# Patient Record
Sex: FEMALE | Race: WHITE | NOT HISPANIC OR LATINO | Employment: UNEMPLOYED | URBAN - METROPOLITAN AREA
[De-identification: names, ages, dates, MRNs, and addresses within clinical notes are randomized per-mention and may not be internally consistent; named-entity substitution may affect disease eponyms.]

---

## 2023-09-28 ENCOUNTER — OFFICE VISIT (OUTPATIENT)
Dept: GASTROENTEROLOGY | Facility: CLINIC | Age: 43
End: 2023-09-28
Payer: MEDICARE

## 2023-09-28 VITALS
BODY MASS INDEX: 33.9 KG/M2 | HEIGHT: 64 IN | HEART RATE: 82 BPM | WEIGHT: 198.6 LBS | DIASTOLIC BLOOD PRESSURE: 71 MMHG | SYSTOLIC BLOOD PRESSURE: 156 MMHG

## 2023-09-28 DIAGNOSIS — R10.10 PAIN OF UPPER ABDOMEN: ICD-10-CM

## 2023-09-28 DIAGNOSIS — K59.00 CONSTIPATION, UNSPECIFIED CONSTIPATION TYPE: Primary | ICD-10-CM

## 2023-09-28 DIAGNOSIS — R11.0 NAUSEA: ICD-10-CM

## 2023-09-28 DIAGNOSIS — K21.9 GASTROESOPHAGEAL REFLUX DISEASE WITHOUT ESOPHAGITIS: ICD-10-CM

## 2023-09-28 PROCEDURE — 1159F MED LIST DOCD IN RCRD: CPT | Performed by: NURSE PRACTITIONER

## 2023-09-28 PROCEDURE — 1160F RVW MEDS BY RX/DR IN RCRD: CPT | Performed by: NURSE PRACTITIONER

## 2023-09-28 PROCEDURE — 99214 OFFICE O/P EST MOD 30 MIN: CPT | Performed by: NURSE PRACTITIONER

## 2023-09-28 RX ORDER — FAMOTIDINE 20 MG/1
20 TABLET, FILM COATED ORAL NIGHTLY
Qty: 30 TABLET | Refills: 1 | Status: SHIPPED | OUTPATIENT
Start: 2023-09-28

## 2023-09-28 RX ORDER — PROMETHAZINE HYDROCHLORIDE 25 MG/1
25 TABLET ORAL EVERY 6 HOURS PRN
Qty: 30 TABLET | Refills: 3 | Status: SHIPPED | OUTPATIENT
Start: 2023-09-28

## 2023-09-28 NOTE — PATIENT INSTRUCTIONS
Continue Linzess 72 daily     Continue protonix 40 mg daily     Add pepcid 20 mg before dinner or bedtime     Give me update in 1-2 weeks

## 2023-09-28 NOTE — PROGRESS NOTES
Chief Complaint   Heartburn and Constipation    History of Present Illness       Liyah Butts is a 43 y.o. female who presents to [facility] for follow-up for constipation. She was last seen in the office by me on 6/28/23.      She underwent EGD with Dr. Damon on 6/16/2023.EGD showed gastritis.  Otherwise normal.     She admits she is not on Protonix.  She does not remember taking it.  She is still having a lot of breakthrough reflux.  Worse with food.     She is having issues with constipation. She is trying colace but admits its not helping. Miralax doesn't work. She admits this was never an issue until she started taking all these new medicines. She denies any rectal bleeding or melena. Good appetite.      Colonoscopy--never had one     GI family history---denies any significant     Doing better with Linzess 72 daily. Bowels moving better. Still having nausea off and on throughout the day. Using phenergan 1-2 times a day. Reflux better on protonix 40 mg daily. She does report the upper abd pain is better since starting these two meds.   Results       Result Review :       CMP          6/14/2023    01:40 6/14/2023    02:04 6/14/2023    04:35 6/14/2023    07:55 6/14/2023    11:37 6/14/2023    22:52 6/15/2023    05:31 6/16/2023    04:49   CMP   Glucose 352  350  382  232  186   114  134    BUN  40  40  36  31   32  29    Creatinine  1.83  2.01  1.59  1.40   1.19  1.04    EGFR  35.0  31.3  41.4  48.3   58.7  69.0    Sodium 142.2  140  135  134  133   134  135    Potassium 4.2  4.2  3.5  3.7  3.5  4.0  3.9  3.8    Chloride 102  98  98  100  99   103  102    Calcium  10.3  9.0  8.7  8.7   9.0  9.1    Total Protein  8.9          Albumin  4.4          Globulin  4.5          Total Bilirubin  0.6          Alkaline Phosphatase  59          AST (SGOT)  20          ALT (SGPT)  18          Albumin/Globulin Ratio  1.0          BUN/Creatinine Ratio  21.9  19.9  22.6  22.1   26.9  27.9     Anion Gap  25.1  18.8  13.0  13.3   9.5  13.2      CBC          6/14/2023    02:04 6/14/2023    04:35 6/15/2023    05:31 6/16/2023    04:49   CBC   WBC 17.40  15.15  11.73  9.80    RBC 5.57  4.77  4.73  4.54    Hemoglobin 15.4  13.1  13.1  12.7    Hematocrit 44.1  38.8  39.2  37.3    MCV 79.2  81.3  82.9  82.2    MCH 27.6  27.5  27.7  28.0    MCHC 34.9  33.8  33.4  34.0    RDW 14.1  14.1  14.4  13.7    Platelets 661  561  487  429      Lipid Panel          5/23/2023    12:47   Lipid Panel   Total Cholesterol 389    Triglycerides 669    HDL Cholesterol 41    VLDL Cholesterol 148    LDL Cholesterol  200    LDL/HDL Ratio 5.22      TSH          5/23/2023    12:47   TSH   TSH 1.760                    Past Medical History       Past Medical History:   Diagnosis Date    Anxiety 2021    Anxiety    Brain tumor (benign)     pituitary gland    Brain tumor (benign)     pituatary gland    Cancer     ovarian; history of    Depression 2021    Diabetes mellitus     Headache 2003    Hyperlipidemia 2018    Hypertension 2018    Obesity 1980s    Visual impairment        Past Surgical History:   Procedure Laterality Date    ENDOSCOPY N/A 6/16/2023    Procedure: ESOPHAGOGASTRODUODENOSCOPY w/ biopsy;  Surgeon: Alyssa Damon MD;  Location: [facility] ENDOSCOPY;  Service: Gastroenterology;  Laterality: N/A;  gastritis    EYE SURGERY  Feb 2022    HYSTERECTOMY           Current Outpatient Medications:     atorvastatin (LIPITOR) 40 MG tablet, TAKE 1 TABLET BY MOUTH EVERY DAY AT NIGHT, Disp: 90 tablet, Rfl: 1    B Complex-C (B-complex with vitamin C) tablet, Take 1 tablet by mouth Daily., Disp: 30 tablet, Rfl: 11    Blood Glucose Monitoring Suppl (ONE TOUCH ULTRA MINI) w/Device kit, 1 each by Other route 3 (Three) Times a Day Before Meals., Disp: 1 each, Rfl: 0    buPROPion XL (WELLBUTRIN XL) 150 MG 24 hr tablet, Take 1 tablet by mouth Daily., Disp: 90 tablet, Rfl: 1    fenofibrate (TRICOR) 145 MG tablet, TAKE 1 TABLET BY MOUTH EVERY  DAY, Disp: 90 tablet, Rfl: 1    Glucose Blood (Blood Glucose Test Strips 333) strip, 90 each by In Vitro route 3 (Three) Times a Day. Pharmacist Choice Use as directed to check blood sugars TID  DX: E11.9, Disp: 90 strip, Rfl: 3    glucose blood test strip, 1 each by Other route 3 (Three) Times a Day Before Meals. Use as instructed, Disp: 90 each, Rfl: 12    glucose monitor monitoring kit, 1 each 3 (Three) Times a Day Before Meals. Pharmacist Choice  Use as directed to check blood sugars TID  DX: E11.9, Disp: 1 each, Rfl: 0    hydrOXYzine (ATARAX) 25 MG tablet, TAKE 1-2 TABS BY MOUTH TWICE DAILY, Disp: 120 tablet, Rfl: 2    insulin detemir (Levemir FlexPen) 100 UNIT/ML injection, Inject 20 Units under the skin into the appropriate area as directed Daily., Disp: 3 mL, Rfl: 3    Insulin Pen Needle (BD Pen Needle Imelda U/F) 32G X 4 MM misc, Use 1 each Daily as directed., Disp: 100 each, Rfl: 0    Jardiance 10 MG tablet tablet, TAKE 1 TABLET BY MOUTH EVERY DAY, Disp: 30 tablet, Rfl: 0    Lancets misc, 90 each 3 (Three) Times a Day. UAD to check blood sugars TID  DX: E11.9, Disp: 90 each, Rfl: 3    linaclotide (Linzess) 72 MCG capsule capsule, Take 1 capsule by mouth Every Morning Before Breakfast., Disp: 90 capsule, Rfl: 3    losartan (COZAAR) 50 MG tablet, Take 1 tablet by mouth Daily., Disp: 90 tablet, Rfl: 1    OneTouch Delica Lancets 33G misc, 1 each by Other route 3 (Three) Times a Day Before Meals., Disp: 100 each, Rfl: 12    pantoprazole (PROTONIX) 40 MG EC tablet, TAKE 1 TABLET BY MOUTH EVERY DAY, Disp: 90 tablet, Rfl: 1    promethazine (PHENERGAN) 25 MG tablet, Take 1 tablet by mouth Every 6 (Six) Hours As Needed for Nausea or Vomiting., Disp: 30 tablet, Rfl: 3    vitamin D (ERGOCALCIFEROL) 1.25 MG (39906 UT) capsule capsule, Take 1 capsule by mouth 1 (One) Time Per Week. Take with a fatty meal, Disp: 13 capsule, Rfl: 1    famotidine (PEPCID) 20 MG tablet, Take 1 tablet by mouth Every Night., Disp: 30 tablet,  "Rfl: 1     No Known Allergies    Family History   Problem Relation Age of Onset    Arthritis Mother     Diabetes Mother     Drug abuse Mother     Heart disease Mother     Hyperlipidemia Mother         Social History     Social History Narrative    Lives with spouse    Eats at least 2 meals per day     Exercises daily- walking    Broken sleep        Objective       Review of Systems   Constitutional:  Positive for appetite change. Negative for fatigue, fever, unexpected weight gain and unexpected weight loss.   HENT:  Negative for trouble swallowing.    Respiratory:  Negative for cough, choking, chest tightness, shortness of breath, wheezing and stridor.    Cardiovascular:  Negative for chest pain, palpitations and leg swelling.   Gastrointestinal:  Positive for abdominal distention, constipation, nausea, GERD and indigestion. Negative for abdominal pain, anal bleeding, blood in stool, diarrhea, rectal pain and vomiting.      Vital Signs:   /71 (BP Location: Right arm, Patient Position: Sitting, Cuff Size: Adult)   Pulse 82   Ht 162 cm (63.78\")   Wt 90.1 kg (198 lb 9.6 oz)   BMI 34.33 kg/m²       Physical Exam  Constitutional:       General: She is not in acute distress.     Appearance: She is well-developed. She is not ill-appearing.   HENT:      Head: Normocephalic.   Eyes:      Pupils: Pupils are equal, round, and reactive to light.   Cardiovascular:      Rate and Rhythm: Normal rate and regular rhythm.      Heart sounds: Normal heart sounds.   Pulmonary:      Effort: Pulmonary effort is normal.      Breath sounds: Normal breath sounds.   Abdominal:      General: Bowel sounds are normal. There is distension.      Palpations: Abdomen is soft. There is no mass.      Tenderness: There is no abdominal tenderness. There is no guarding or rebound.      Hernia: No hernia is present.   Musculoskeletal:         General: Normal range of motion.   Skin:     General: Skin is warm and dry.   Neurological:      Mental " Status: She is alert and oriented to person, place, and time.   Psychiatric:         Speech: Speech normal.         Behavior: Behavior normal.         Judgment: Judgment normal.         Assessment & Plan          Assessment and Plan    Diagnoses and all orders for this visit:    1. Constipation, unspecified constipation type (Primary)  -     linaclotide (Linzess) 72 MCG capsule capsule; Take 1 capsule by mouth Every Morning Before Breakfast.  Dispense: 90 capsule; Refill: 3    2. Gastroesophageal reflux disease without esophagitis  -     famotidine (PEPCID) 20 MG tablet; Take 1 tablet by mouth Every Night.  Dispense: 30 tablet; Refill: 1    3. Nausea  -     promethazine (PHENERGAN) 25 MG tablet; Take 1 tablet by mouth Every 6 (Six) Hours As Needed for Nausea or Vomiting.  Dispense: 30 tablet; Refill: 3    4. Pain of upper abdomen        GERD definitely seems better on Protonix 40 mg daily.  Still having some breakthrough reflux however.  We will start Pepcid 20 mg at night.  I am hoping this will help with the nausea she has been experiencing lately.  No longer having the upper abdominal pain.  Bowels moving better on Linzess 72 daily.  Continue a high-fiber diet.  Patient to call the office in 1 to 2 weeks with an update.  Patient to follow-up with me in 3 months.  Patient is agreeable to the plan.        Follow Up       Follow Up   Return in about 3 months (around 12/28/2023) for CONSTIPATION, GERD.  Patient was given instructions and counseling regarding her condition or for health maintenance advice. Please see specific information pulled into the AVS if appropriate.

## 2023-10-10 RX ORDER — HYDROXYZINE HYDROCHLORIDE 25 MG/1
TABLET, FILM COATED ORAL
Qty: 180 TABLET | Refills: 2 | Status: SHIPPED | OUTPATIENT
Start: 2023-10-10

## 2023-10-12 RX ORDER — EMPAGLIFLOZIN 10 MG/1
TABLET, FILM COATED ORAL
Qty: 30 TABLET | Refills: 0 | Status: SHIPPED | OUTPATIENT
Start: 2023-10-12

## 2023-10-24 DIAGNOSIS — K21.9 GASTROESOPHAGEAL REFLUX DISEASE WITHOUT ESOPHAGITIS: ICD-10-CM

## 2023-10-24 RX ORDER — FAMOTIDINE 20 MG/1
20 TABLET, FILM COATED ORAL NIGHTLY
Qty: 90 TABLET | Refills: 0 | Status: SHIPPED | OUTPATIENT
Start: 2023-10-24

## 2023-10-24 NOTE — TELEPHONE ENCOUNTER
Patient is requesting a refill of famotidine 20mg, medication is pended     Last OV: 9/28/2023  Last refill: 9/28/2023  Next OV: 12/28/2023

## 2023-10-30 RX ORDER — FAMOTIDINE 20 MG/1
20 TABLET, FILM COATED ORAL NIGHTLY
Qty: 90 TABLET | Refills: 0 | OUTPATIENT
Start: 2023-10-30

## 2023-11-01 RX ORDER — ERGOCALCIFEROL 1.25 MG/1
50000 CAPSULE ORAL WEEKLY
Qty: 12 CAPSULE | Refills: 2 | Status: SHIPPED | OUTPATIENT
Start: 2023-11-01

## 2023-11-09 RX ORDER — EMPAGLIFLOZIN 10 MG/1
TABLET, FILM COATED ORAL
Qty: 30 TABLET | Refills: 0 | Status: SHIPPED | OUTPATIENT
Start: 2023-11-09

## 2023-11-11 DIAGNOSIS — K21.9 GASTROESOPHAGEAL REFLUX DISEASE WITHOUT ESOPHAGITIS: ICD-10-CM

## 2023-11-13 RX ORDER — PANTOPRAZOLE SODIUM 40 MG/1
40 TABLET, DELAYED RELEASE ORAL
Qty: 180 TABLET | Refills: 1 | Status: SHIPPED | OUTPATIENT
Start: 2023-11-13

## 2023-12-01 DIAGNOSIS — R11.0 NAUSEA: ICD-10-CM

## 2023-12-04 RX ORDER — PROMETHAZINE HYDROCHLORIDE 25 MG/1
25 TABLET ORAL EVERY 6 HOURS PRN
Qty: 30 TABLET | Refills: 3 | Status: SHIPPED | OUTPATIENT
Start: 2023-12-04

## 2023-12-04 NOTE — TELEPHONE ENCOUNTER
Pt is requesting promethazine. Order pended.   Last ov: 9/28/23  Next ov: 12/28/23  Last refill: 9/28/23

## 2023-12-07 RX ORDER — EMPAGLIFLOZIN 10 MG/1
TABLET, FILM COATED ORAL
Qty: 30 TABLET | Refills: 0 | Status: SHIPPED | OUTPATIENT
Start: 2023-12-07

## 2023-12-12 DIAGNOSIS — K21.9 GASTROESOPHAGEAL REFLUX DISEASE WITHOUT ESOPHAGITIS: ICD-10-CM

## 2023-12-12 RX ORDER — FAMOTIDINE 20 MG/1
20 TABLET, FILM COATED ORAL
Qty: 30 TABLET | Refills: 1 | Status: SHIPPED | OUTPATIENT
Start: 2023-12-12

## 2023-12-28 ENCOUNTER — OFFICE VISIT (OUTPATIENT)
Dept: GASTROENTEROLOGY | Facility: CLINIC | Age: 43
End: 2023-12-28
Payer: MEDICARE

## 2023-12-28 VITALS
BODY MASS INDEX: 34.38 KG/M2 | HEIGHT: 64 IN | HEART RATE: 74 BPM | WEIGHT: 201.4 LBS | SYSTOLIC BLOOD PRESSURE: 156 MMHG | DIASTOLIC BLOOD PRESSURE: 81 MMHG

## 2023-12-28 DIAGNOSIS — K59.00 CONSTIPATION, UNSPECIFIED CONSTIPATION TYPE: Primary | ICD-10-CM

## 2023-12-28 DIAGNOSIS — K21.9 GASTROESOPHAGEAL REFLUX DISEASE WITHOUT ESOPHAGITIS: ICD-10-CM

## 2023-12-28 DIAGNOSIS — R10.10 PAIN OF UPPER ABDOMEN: ICD-10-CM

## 2023-12-28 DIAGNOSIS — R11.0 NAUSEA: ICD-10-CM

## 2023-12-28 PROCEDURE — 1160F RVW MEDS BY RX/DR IN RCRD: CPT | Performed by: NURSE PRACTITIONER

## 2023-12-28 PROCEDURE — 99214 OFFICE O/P EST MOD 30 MIN: CPT | Performed by: NURSE PRACTITIONER

## 2023-12-28 PROCEDURE — 1159F MED LIST DOCD IN RCRD: CPT | Performed by: NURSE PRACTITIONER

## 2023-12-28 NOTE — PROGRESS NOTES
Chief Complaint   Constipation and Heartburn    History of Present Illness       Liyah Butts is a 43 y.o. female who presents to [facility] for follow-up for constipation.  She was last seen in the office by me on 9/28/2023.    She underwent EGD with Dr. Damon on 6/16/2023.EGD showed gastritis.  Otherwise normal.     She admits she is not on Protonix.  She does not remember taking it.  She is still having a lot of breakthrough reflux.  Worse with food.     She is having issues with constipation. She is trying colace but admits its not helping. Miralax doesn't work. She admits this was never an issue until she started taking all these new medicines. She denies any rectal bleeding or melena. Good appetite.      Colonoscopy--never had one     GI family history---denies any significant      She has been having good and bad days. Acid foods will mess her up. She does feels reflux better on the pepcid 20  mg at night. Linzess 72 just isnt working as well as it used too.      Results       Result Review :       CMP          6/14/2023    01:40 6/14/2023    02:04 6/14/2023    04:35 6/14/2023    07:55 6/14/2023    11:37 6/14/2023    22:52 6/15/2023    05:31 6/16/2023    04:49   CMP   Glucose 352  350  382  232  186   114  134    BUN  40  40  36  31   32  29    Creatinine  1.83  2.01  1.59  1.40   1.19  1.04    EGFR  35.0  31.3  41.4  48.3   58.7  69.0    Sodium 142.2  140  135  134  133   134  135    Potassium 4.2  4.2  3.5  3.7  3.5  4.0  3.9  3.8    Chloride 102  98  98  100  99   103  102    Calcium  10.3  9.0  8.7  8.7   9.0  9.1    Total Protein  8.9          Albumin  4.4          Globulin  4.5          Total Bilirubin  0.6          Alkaline Phosphatase  59          AST (SGOT)  20          ALT (SGPT)  18          Albumin/Globulin Ratio  1.0          BUN/Creatinine Ratio  21.9  19.9  22.6  22.1   26.9  27.9    Anion Gap  25.1  18.8  13.0  13.3   9.5  13.2      CBC           "6/14/2023    02:04 6/14/2023    04:35 6/15/2023    05:31 6/16/2023    04:49   CBC   WBC 17.40  15.15  11.73  9.80    RBC 5.57  4.77  4.73  4.54    Hemoglobin 15.4  13.1  13.1  12.7    Hematocrit 44.1  38.8  39.2  37.3    MCV 79.2  81.3  82.9  82.2    MCH 27.6  27.5  27.7  28.0    MCHC 34.9  33.8  33.4  34.0    RDW 14.1  14.1  14.4  13.7    Platelets 661  561  487  429      Lipid Panel          5/23/2023    12:47   Lipid Panel   Total Cholesterol 389    Triglycerides 669    HDL Cholesterol 41    VLDL Cholesterol 148    LDL Cholesterol  200    LDL/HDL Ratio 5.22      TSH          5/23/2023    12:47   TSH   TSH 1.760        Lipase   Lipase   Date Value Ref Range Status   06/13/2023 35 13 - 60 U/L Final     Amylase No results found for: \"AMYLASE\"            Past Medical History       Past Medical History:   Diagnosis Date    Anxiety 2021    Anxiety    Brain tumor (benign)     pituitary gland    Brain tumor (benign)     pituatary gland    Cancer     ovarian; history of    Depression 2021    Diabetes mellitus     Headache 2003    Hyperlipidemia 2018    Hypertension 2018    Obesity 1980s    Visual impairment        Past Surgical History:   Procedure Laterality Date    ENDOSCOPY N/A 6/16/2023    Procedure: ESOPHAGOGASTRODUODENOSCOPY w/ biopsy;  Surgeon: Alyssa Damon MD;  Location: [facility] ENDOSCOPY;  Service: Gastroenterology;  Laterality: N/A;  gastritis    EYE SURGERY  Feb 2022    HYSTERECTOMY           Current Outpatient Medications:     atorvastatin (LIPITOR) 40 MG tablet, TAKE 1 TABLET BY MOUTH EVERY DAY AT NIGHT, Disp: 90 tablet, Rfl: 1    B Complex-C (B-complex with vitamin C) tablet, Take 1 tablet by mouth Daily., Disp: 30 tablet, Rfl: 11    Blood Glucose Monitoring Suppl (ONE TOUCH ULTRA MINI) w/Device kit, 1 each by Other route 3 (Three) Times a Day Before Meals., Disp: 1 each, Rfl: 0    buPROPion XL (WELLBUTRIN XL) 150 MG 24 hr tablet, Take 1 tablet by mouth Daily., Disp: 90 tablet, Rfl: 1    famotidine " (PEPCID) 20 MG tablet, TAKE 1 TABLET BY MOUTH EVERY DAY AT NIGHT, Disp: 30 tablet, Rfl: 1    fenofibrate (TRICOR) 145 MG tablet, TAKE 1 TABLET BY MOUTH EVERY DAY, Disp: 90 tablet, Rfl: 1    Glucose Blood (Blood Glucose Test Strips 333) strip, 90 each by In Vitro route 3 (Three) Times a Day. Pharmacist Choice Use as directed to check blood sugars TID  DX: E11.9, Disp: 90 strip, Rfl: 3    glucose blood test strip, 1 each by Other route 3 (Three) Times a Day Before Meals. Use as instructed, Disp: 90 each, Rfl: 12    glucose monitor monitoring kit, 1 each 3 (Three) Times a Day Before Meals. Pharmacist Choice  Use as directed to check blood sugars TID  DX: E11.9, Disp: 1 each, Rfl: 0    hydrOXYzine (ATARAX) 25 MG tablet, TAKE 1-2 TABS BY MOUTH TWICE DAILY, Disp: 180 tablet, Rfl: 2    insulin detemir (Levemir FlexPen) 100 UNIT/ML injection, Inject 20 Units under the skin into the appropriate area as directed Daily., Disp: 3 mL, Rfl: 3    Insulin Pen Needle (BD Pen Needle Imelda U/F) 32G X 4 MM misc, Use 1 each Daily as directed., Disp: 100 each, Rfl: 0    Jardiance 10 MG tablet tablet, TAKE 1 TABLET BY MOUTH EVERY DAY, Disp: 30 tablet, Rfl: 0    Lancets misc, 90 each 3 (Three) Times a Day. UAD to check blood sugars TID  DX: E11.9, Disp: 90 each, Rfl: 3    linaclotide (Linzess) 72 MCG capsule capsule, Take 1 capsule by mouth Every Morning Before Breakfast., Disp: 90 capsule, Rfl: 3    losartan (COZAAR) 50 MG tablet, Take 1 tablet by mouth Daily., Disp: 90 tablet, Rfl: 1    OneTouch Delica Lancets 33G misc, 1 each by Other route 3 (Three) Times a Day Before Meals., Disp: 100 each, Rfl: 12    pantoprazole (PROTONIX) 40 MG EC tablet, TAKE 1 TABLET BY MOUTH 2 TIMES A DAY BEFORE MEALS., Disp: 180 tablet, Rfl: 1    promethazine (PHENERGAN) 25 MG tablet, TAKE 1 TABLET BY MOUTH EVERY 6 HOURS AS NEEDED FOR NAUSEA OR VOMITING., Disp: 30 tablet, Rfl: 3    vitamin D (ERGOCALCIFEROL) 1.25 MG (35315 UT) capsule capsule, TAKE 1 CAPSULE BY  "MOUTH 1 (ONE) TIME PER WEEK. TAKE WITH A FATTY MEAL, Disp: 12 capsule, Rfl: 2     No Known Allergies    Family History   Problem Relation Age of Onset    Arthritis Mother     Diabetes Mother     Drug abuse Mother     Heart disease Mother     Hyperlipidemia Mother         Social History     Social History Narrative    Lives with spouse    Eats at least 2 meals per day     Exercises daily- walking    Broken sleep        Objective       Review of Systems   Constitutional:  Negative for appetite change, fatigue, fever, unexpected weight gain and unexpected weight loss.   HENT:  Negative for trouble swallowing.    Respiratory:  Negative for cough, choking, chest tightness, shortness of breath, wheezing and stridor.    Cardiovascular:  Negative for chest pain, palpitations and leg swelling.   Gastrointestinal:  Positive for abdominal distention, abdominal pain, constipation, nausea, GERD and indigestion. Negative for anal bleeding, blood in stool, diarrhea, rectal pain and vomiting.        Vital Signs:   /81 (BP Location: Left arm, Patient Position: Sitting, Cuff Size: Adult)   Pulse 74   Ht 162.6 cm (64\")   Wt 91.4 kg (201 lb 6.4 oz)   BMI 34.57 kg/m²       Physical Exam  Constitutional:       General: She is not in acute distress.     Appearance: She is well-developed. She is not ill-appearing.   HENT:      Head: Normocephalic.   Eyes:      Pupils: Pupils are equal, round, and reactive to light.   Cardiovascular:      Rate and Rhythm: Normal rate and regular rhythm.      Heart sounds: Normal heart sounds.   Pulmonary:      Effort: Pulmonary effort is normal.      Breath sounds: Normal breath sounds.   Abdominal:      General: Bowel sounds are normal. There is distension.      Palpations: Abdomen is soft. There is no mass.      Tenderness: There is no abdominal tenderness. There is no guarding or rebound.      Hernia: No hernia is present.   Musculoskeletal:         General: Normal range of motion.   Skin:     " General: Skin is warm and dry.   Neurological:      Mental Status: She is alert and oriented to person, place, and time.   Psychiatric:         Speech: Speech normal.         Behavior: Behavior normal.         Judgment: Judgment normal.           Assessment & Plan          Assessment and Plan    Diagnoses and all orders for this visit:    1. Constipation, unspecified constipation type (Primary)    2. Gastroesophageal reflux disease without esophagitis    3. Nausea    4. Pain of upper abdomen    Constipation is definitely better on Linzess 72 but could be better.  At this time will increase to 145 daily and see how she does.  Samples given today.  GERD is better with the addition of Pepcid at night.  Continue Protonix in the morning.  Okay to increase Pepcid to 40 mg twice daily if needed.  Continue GERD precautions.  Patient to call the office in 1 to 2 weeks with an update.  Patient to follow-up with me in 6 months.  Patient is agreeable to the plan.            Follow Up       Follow Up   Return in about 6 months (around 6/28/2024) for GERD, CONSTIPATION.  Patient was given instructions and counseling regarding her condition or for health maintenance advice. Please see specific information pulled into the AVS if appropriate.

## 2023-12-28 NOTE — PATIENT INSTRUCTIONS
Increase linzess 145 daily (before food)    Continue pepcid 20 mg at night (can increase to 40 mg twice a day if needed)

## 2024-01-09 RX ORDER — EMPAGLIFLOZIN 10 MG/1
TABLET, FILM COATED ORAL
Qty: 30 TABLET | Refills: 0 | OUTPATIENT
Start: 2024-01-09

## 2024-01-29 NOTE — PROGRESS NOTES
The ABCs of the Annual Wellness Visit  Initial Medicare Wellness Visit    Subjective     Liyah Butts is a 43 y.o. female who presents for an Initial Medicare Wellness Visit.    The following portions of the patient's history were reviewed and   updated as appropriate: She  has a past medical history of Anxiety (2021), Brain tumor (benign), Brain tumor (benign), Cancer, Depression (2021), Diabetes mellitus, Headache (2003), Hyperlipidemia (2018), Hypertension (2018), Obesity (1980s), and Visual impairment.  She does not have any pertinent problems on file.  She  has a past surgical history that includes Hysterectomy; Eye surgery (Feb 2022); and Esophagogastroduodenoscopy (N/A, 6/16/2023).  Her family history includes Arthritis in her mother; Diabetes in her mother; Drug abuse in her mother; Heart disease in her mother; Hyperlipidemia in her mother.  She  reports that she has never smoked. She has never used smokeless tobacco. She reports current drug use. Frequency: 7.00 times per week. Drug: Marijuana. She reports that she does not drink alcohol.  Current Outpatient Medications   Medication Sig Dispense Refill   • atorvastatin (LIPITOR) 40 MG tablet TAKE 1 TABLET BY MOUTH EVERY DAY AT NIGHT 90 tablet 1   • B Complex-C (B-complex with vitamin C) tablet Take 1 tablet by mouth Daily. 30 tablet 11   • Blood Glucose Monitoring Suppl (ONE TOUCH ULTRA MINI) w/Device kit 1 each by Other route 3 (Three) Times a Day Before Meals. 1 each 0   • famotidine (PEPCID) 20 MG tablet TAKE 1 TABLET BY MOUTH EVERY DAY AT NIGHT 30 tablet 1   • fenofibrate (TRICOR) 145 MG tablet TAKE 1 TABLET BY MOUTH EVERY DAY 90 tablet 1   • Glucose Blood (Blood Glucose Test Strips 333) strip 90 each by In Vitro route 3 (Three) Times a Day. Pharmacist Choice  Use as directed to check blood sugars TID   DX: E11.9 90 strip 3   • glucose blood test strip 1 each by Other route 3 (Three) Times a Day Before Meals. Use as instructed 90 each 12   •  glucose monitor monitoring kit 1 each 3 (Three) Times a Day Before Meals. Pharmacist Choice   Use as directed to check blood sugars TID   DX: E11.9 1 each 0   • hydrOXYzine (ATARAX) 25 MG tablet TAKE 1-2 TABS BY MOUTH TWICE DAILY 180 tablet 2   • insulin detemir (Levemir FlexPen) 100 UNIT/ML injection Inject 20 Units under the skin into the appropriate area as directed Daily. 3 mL 3   • Insulin Pen Needle (BD Pen Needle Imelda U/F) 32G X 4 MM misc Use 1 each Daily as directed. 100 each 0   • Jardiance 10 MG tablet tablet TAKE 1 TABLET BY MOUTH EVERY DAY 30 tablet 0   • Lancets misc 90 each 3 (Three) Times a Day. UAD to check blood sugars TID   DX: E11.9 90 each 3   • linaclotide (Linzess) 145 MCG capsule capsule Take 1 capsule by mouth Every Morning Before Breakfast. 12 capsule 0   • OneTouch Delica Lancets 33G misc 1 each by Other route 3 (Three) Times a Day Before Meals. 100 each 12   • pantoprazole (PROTONIX) 40 MG EC tablet TAKE 1 TABLET BY MOUTH 2 TIMES A DAY BEFORE MEALS. 180 tablet 1   • promethazine (PHENERGAN) 25 MG tablet TAKE 1 TABLET BY MOUTH EVERY 6 HOURS AS NEEDED FOR NAUSEA OR VOMITING. 30 tablet 3   • vitamin D (ERGOCALCIFEROL) 1.25 MG (51082 UT) capsule capsule TAKE 1 CAPSULE BY MOUTH 1 (ONE) TIME PER WEEK. TAKE WITH A FATTY MEAL 12 capsule 2   • buPROPion XL (Wellbutrin XL) 300 MG 24 hr tablet Take 1 tablet by mouth Every Morning. 90 tablet 1   • linaclotide (Linzess) 72 MCG capsule capsule Take 1 capsule by mouth Every Morning Before Breakfast. (Patient not taking: Reported on 1/30/2024) 90 capsule 3   • losartan (Cozaar) 100 MG tablet Take 1 tablet by mouth Daily. 90 tablet 1     No current facility-administered medications for this visit.     Current Outpatient Medications on File Prior to Visit   Medication Sig   • atorvastatin (LIPITOR) 40 MG tablet TAKE 1 TABLET BY MOUTH EVERY DAY AT NIGHT   • B Complex-C (B-complex with vitamin C) tablet Take 1 tablet by mouth Daily.   • Blood Glucose  Monitoring Suppl (ONE TOUCH ULTRA MINI) w/Device kit 1 each by Other route 3 (Three) Times a Day Before Meals.   • famotidine (PEPCID) 20 MG tablet TAKE 1 TABLET BY MOUTH EVERY DAY AT NIGHT   • fenofibrate (TRICOR) 145 MG tablet TAKE 1 TABLET BY MOUTH EVERY DAY   • Glucose Blood (Blood Glucose Test Strips 333) strip 90 each by In Vitro route 3 (Three) Times a Day. Pharmacist Choice  Use as directed to check blood sugars TID   DX: E11.9   • glucose blood test strip 1 each by Other route 3 (Three) Times a Day Before Meals. Use as instructed   • glucose monitor monitoring kit 1 each 3 (Three) Times a Day Before Meals. Pharmacist Choice   Use as directed to check blood sugars TID   DX: E11.9   • hydrOXYzine (ATARAX) 25 MG tablet TAKE 1-2 TABS BY MOUTH TWICE DAILY   • insulin detemir (Levemir FlexPen) 100 UNIT/ML injection Inject 20 Units under the skin into the appropriate area as directed Daily.   • Insulin Pen Needle (BD Pen Needle Imelda U/F) 32G X 4 MM misc Use 1 each Daily as directed.   • Jardiance 10 MG tablet tablet TAKE 1 TABLET BY MOUTH EVERY DAY   • Lancets misc 90 each 3 (Three) Times a Day. UAD to check blood sugars TID   DX: E11.9   • linaclotide (Linzess) 145 MCG capsule capsule Take 1 capsule by mouth Every Morning Before Breakfast.   • OneTouch Delica Lancets 33G misc 1 each by Other route 3 (Three) Times a Day Before Meals.   • pantoprazole (PROTONIX) 40 MG EC tablet TAKE 1 TABLET BY MOUTH 2 TIMES A DAY BEFORE MEALS.   • promethazine (PHENERGAN) 25 MG tablet TAKE 1 TABLET BY MOUTH EVERY 6 HOURS AS NEEDED FOR NAUSEA OR VOMITING.   • vitamin D (ERGOCALCIFEROL) 1.25 MG (64743 UT) capsule capsule TAKE 1 CAPSULE BY MOUTH 1 (ONE) TIME PER WEEK. TAKE WITH A FATTY MEAL   • [DISCONTINUED] buPROPion XL (WELLBUTRIN XL) 150 MG 24 hr tablet Take 1 tablet by mouth Daily.   • [DISCONTINUED] losartan (COZAAR) 50 MG tablet Take 1 tablet by mouth Daily.   • linaclotide (Linzess) 72 MCG capsule capsule Take 1 capsule by  mouth Every Morning Before Breakfast. (Patient not taking: Reported on 1/30/2024)     No current facility-administered medications on file prior to visit.     She has No Known Allergies..     Compared to one year ago, the patient feels her physical   health is worse.    Compared to one year ago, the patient feels her mental   health is the same.    Recent Hospitalizations:  This patient has had a [facility] admission record on file within the last 365 days.    Current Medical Providers:  Patient Care Team:  Mayela Bruce APRN as PCP - General (Nurse Practitioner)  Yvonne Sigala APRN as Nurse Practitioner (Nurse Practitioner)    Outpatient Medications Prior to Visit   Medication Sig Dispense Refill   • atorvastatin (LIPITOR) 40 MG tablet TAKE 1 TABLET BY MOUTH EVERY DAY AT NIGHT 90 tablet 1   • B Complex-C (B-complex with vitamin C) tablet Take 1 tablet by mouth Daily. 30 tablet 11   • Blood Glucose Monitoring Suppl (ONE TOUCH ULTRA MINI) w/Device kit 1 each by Other route 3 (Three) Times a Day Before Meals. 1 each 0   • famotidine (PEPCID) 20 MG tablet TAKE 1 TABLET BY MOUTH EVERY DAY AT NIGHT 30 tablet 1   • fenofibrate (TRICOR) 145 MG tablet TAKE 1 TABLET BY MOUTH EVERY DAY 90 tablet 1   • Glucose Blood (Blood Glucose Test Strips 333) strip 90 each by In Vitro route 3 (Three) Times a Day. Pharmacist Choice  Use as directed to check blood sugars TID   DX: E11.9 90 strip 3   • glucose blood test strip 1 each by Other route 3 (Three) Times a Day Before Meals. Use as instructed 90 each 12   • glucose monitor monitoring kit 1 each 3 (Three) Times a Day Before Meals. Pharmacist Choice   Use as directed to check blood sugars TID   DX: E11.9 1 each 0   • hydrOXYzine (ATARAX) 25 MG tablet TAKE 1-2 TABS BY MOUTH TWICE DAILY 180 tablet 2   • insulin detemir (Levemir FlexPen) 100 UNIT/ML injection Inject 20 Units under the skin into the appropriate area as directed Daily. 3 mL 3   • Insulin Pen Needle (BD  Pen Needle Imelda U/F) 32G X 4 MM misc Use 1 each Daily as directed. 100 each 0   • Jardiance 10 MG tablet tablet TAKE 1 TABLET BY MOUTH EVERY DAY 30 tablet 0   • Lancets misc 90 each 3 (Three) Times a Day. UAD to check blood sugars TID   DX: E11.9 90 each 3   • linaclotide (Linzess) 145 MCG capsule capsule Take 1 capsule by mouth Every Morning Before Breakfast. 12 capsule 0   • OneTouch Delica Lancets 33G misc 1 each by Other route 3 (Three) Times a Day Before Meals. 100 each 12   • pantoprazole (PROTONIX) 40 MG EC tablet TAKE 1 TABLET BY MOUTH 2 TIMES A DAY BEFORE MEALS. 180 tablet 1   • promethazine (PHENERGAN) 25 MG tablet TAKE 1 TABLET BY MOUTH EVERY 6 HOURS AS NEEDED FOR NAUSEA OR VOMITING. 30 tablet 3   • vitamin D (ERGOCALCIFEROL) 1.25 MG (70998 UT) capsule capsule TAKE 1 CAPSULE BY MOUTH 1 (ONE) TIME PER WEEK. TAKE WITH A FATTY MEAL 12 capsule 2   • buPROPion XL (WELLBUTRIN XL) 150 MG 24 hr tablet Take 1 tablet by mouth Daily. 90 tablet 1   • losartan (COZAAR) 50 MG tablet Take 1 tablet by mouth Daily. 90 tablet 1   • linaclotide (Linzess) 72 MCG capsule capsule Take 1 capsule by mouth Every Morning Before Breakfast. (Patient not taking: Reported on 1/30/2024) 90 capsule 3     No facility-administered medications prior to visit.       No opioid medication identified on active medication list. I have reviewed chart for other potential  high risk medication/s and harmful drug interactions in the elderly.        Aspirin is not on active medication list.  Aspirin use is not indicated based on review of current medical condition/s. Risk of harm outweighs potential benefits.  .    Patient Active Problem List   Diagnosis   • Headache, migraine   • HLD (hyperlipidemia)   • Hyperprolactinemia   • Macroadenoma   • Obesity   • Pituitary tumor   • Diabetic ketoacidosis without coma associated with type 2 diabetes mellitus   • Nausea and vomiting     Advance Care Planning   Advance Care Planning     Advance Directive is  "not on file.  ACP discussion was held with the patient during this visit. Patient does not have an advance directive, declines further assistance.       Objective    Vitals:    01/30/24 1502 01/30/24 1506   BP: 170/89 154/92   BP Location: Right arm Right arm   Patient Position: Sitting Sitting   Cuff Size: Adult Adult   Pulse: 89    Temp: 97.1 °F (36.2 °C)    TempSrc: Infrared    SpO2: 97%    Weight: 88.9 kg (196 lb)    Height: 162.6 cm (64.02\")      Estimated body mass index is 33.63 kg/m² as calculated from the following:    Height as of this encounter: 162.6 cm (64.02\").    Weight as of this encounter: 88.9 kg (196 lb).    BMI is >= 30 and <35. (Class 1 Obesity). The following options were offered after discussion;: exercise counseling/recommendations and nutrition counseling/recommendations      Does the patient have evidence of cognitive impairment?   No          HEALTH RISK ASSESSMENT    Smoking Status:  Social History     Tobacco Use   Smoking Status Never   Smokeless Tobacco Never     Alcohol Consumption:  Social History     Substance and Sexual Activity   Alcohol Use No     Fall Risk Screen:    STEADI Fall Risk Assessment was completed, and patient is at HIGH risk for falls. Assessment completed on:1/30/2024    Depression Screen:       1/30/2024     3:02 PM   PHQ-2/PHQ-9 Depression Screening   Little Interest or Pleasure in Doing Things 1-->several days   Feeling Down, Depressed or Hopeless 1-->several days   Trouble Falling or Staying Asleep, or Sleeping Too Much 3-->nearly every day   Feeling Tired or Having Little Energy 3-->nearly every day   Poor Appetite or Overeating 3-->nearly every day   Feeling Bad about Yourself - or that You are a Failure or Have Let Yourself or Your Family Down 3-->nearly every day   Trouble Concentrating on Things, Such as Reading the Newspaper or Watching Television 1-->several days   Moving or Speaking So Slowly that Other People Could Have Noticed? Or the Opposite - Being " So Fidgety 3-->nearly every day   Thoughts that You Would be Better Off Dead or of Hurting Yourself in Some Way 0-->not at all   PHQ-9: Brief Depression Severity Measure Score 18   If You Checked Off Any Problems, How Difficult Have These Problems Made It For You to Do Your Work, Take Care of Things at Home, or Get Along with Other People? somewhat difficult       Health Habits and Functional and Cognitive Screenin/30/2024     3:01 PM   Functional & Cognitive Status   Do you have difficulty preparing food and eating? Yes   Do you have difficulty bathing yourself, getting dressed or grooming yourself? No   Do you have difficulty using the toilet? No   Do you have difficulty moving around from place to place? Yes   Do you have trouble with steps or getting out of a bed or a chair? Yes   Current Diet Low Carb Diet   Dental Exam Up to date   Eye Exam Not up to date   Exercise (times per week) 0 times per week   Current Exercises Include House Cleaning   Do you need help using the phone?  Yes   Are you deaf or do you have serious difficulty hearing?  No   Do you need help to go to places out of walking distance? Yes   Do you need help shopping? Yes   Do you need help preparing meals?  Yes   Do you need help with housework?  Yes   Do you need help with laundry? Yes   Do you need help taking your medications? Yes   Do you need help managing money? Yes   Do you ever drive or ride in a car without wearing a seat belt? No   Have you felt unusual stress, anger or loneliness in the last month? Yes   If you need help, do you have trouble finding someone available to you? Yes   Have you been bothered in the last four weeks by sexual problems? No   Do you have difficulty concentrating, remembering or making decisions? Yes       Age-appropriate Screening Schedule:  Refer to the list below for future screening recommendations based on patient's age, sex and/or medical conditions. Orders for these recommended tests are  listed in the plan section. The patient has been provided with a written plan.    Health Maintenance   Topic Date Due   • Hepatitis B (1 of 3 - 3-dose series) Never done   • BMI FOLLOWUP  Never done   • HEPATITIS C SCREENING  Never done   • HEMOGLOBIN A1C  12/14/2023   • INFLUENZA VACCINE  02/16/2024 (Originally 8/1/2023)   • TDAP/TD VACCINES (1 - Tdap) 05/23/2024 (Originally 7/24/1999)   • COVID-19 Vaccine (1) 01/30/2025 (Originally 1/24/1981)   • DIABETIC EYE EXAM  03/01/2024   • LIPID PANEL  05/23/2024   • URINE MICROALBUMIN  05/23/2024   • DIABETIC FOOT EXAM  06/21/2024   • ANNUAL WELLNESS VISIT  01/30/2025   • Pneumococcal Vaccine 0-64  Completed          CMS Preventative Services Quick Reference  Risk Factors Identified During Encounter    Immunizations Discussed/Encouraged: Tdap, Hepatitis B Vaccine/Series, Influenza, and COVID19    The above risks/problems have been discussed with the patient.  Pertinent information has been shared with the patient in the After Visit Summary.  An After Visit Summary and PPPS were made available to the patient.  Diagnoses and all orders for this visit:    1. Encounter for annual wellness exam in Medicare patient (Primary)    2. Type 2 diabetes mellitus with hyperglycemia, with long-term current use of insulin  -     Ambulatory Referral to Home Health  -     Hemoglobin A1c; Future  -     Vitamin B12; Future  -     Folate; Future    3. Essential hypertension  -     Ambulatory Referral to Home Health  -     Comprehensive Metabolic Panel; Future  -     CBC & Differential; Future  -     T4, Free; Future  -     TSH; Future  -     Magnesium; Future    4. Hyperlipidemia, unspecified hyperlipidemia type  -     Lipid Panel; Future    5. Anxiety and depression  -     Ambulatory Referral to Psychiatry  -     Ambulatory Referral to Home Health    6. Vitamin D deficiency  -     Vitamin D,25-Hydroxy; Future    Other orders  -     buPROPion XL (Wellbutrin XL) 300 MG 24 hr tablet; Take 1  "tablet by mouth Every Morning.  Dispense: 90 tablet; Refill: 1  -     losartan (Cozaar) 100 MG tablet; Take 1 tablet by mouth Daily.  Dispense: 90 tablet; Refill: 1      Follow Up:  Next Medicare Wellness visit to be scheduled in 1 year.        Additional E&M Note during same encounter follows:  Patient has multiple medical problems which are significant and separately identifiable that require additional work above and beyond the Medicare Wellness Visit.      Chief Complaint  Medicare Wellness-subsequent    Subjective        HPI  Liyah Butts is also being seen today for follow-up diabetes type 2, anxiety, depression, hypertension and hyperlipidemia. Metformin causes diarrhea and has not helped. Patient reports she is diet complaint.  FBS have been 120s.  Negative chest pain.          Objective   Vital Signs:  /92 (BP Location: Right arm, Patient Position: Sitting, Cuff Size: Adult)   Pulse 89   Temp 97.1 °F (36.2 °C) (Infrared)   Ht 162.6 cm (64.02\")   Wt 88.9 kg (196 lb)   SpO2 97%   BMI 33.63 kg/m²     Physical Exam  Vitals reviewed.   Constitutional:       General: She is not in acute distress.  HENT:      Head: Normocephalic and atraumatic.   Eyes:      Conjunctiva/sclera: Conjunctivae normal.   Cardiovascular:      Rate and Rhythm: Normal rate and regular rhythm.      Heart sounds: Normal heart sounds. No murmur heard.  Pulmonary:      Effort: Pulmonary effort is normal.      Breath sounds: Normal breath sounds. No wheezing, rhonchi or rales.   Abdominal:      General: There is no distension.      Palpations: Abdomen is soft. There is no mass.      Tenderness: There is abdominal tenderness in the epigastric area.   Musculoskeletal:      Right lower leg: No edema.      Left lower leg: No edema.   Lymphadenopathy:      Cervical: No cervical adenopathy.   Skin:     General: Skin is warm and dry.      Coloration: Skin is not jaundiced or pale.   Neurological:      General: No focal deficit " present.      Mental Status: She is alert.   Psychiatric:         Mood and Affect: Mood normal.         Thought Content: Thought content normal.          The following data was reviewed by: SHAE Mcgee on 01/30/2024:  Common labs          6/14/2023    01:40 6/14/2023    02:04 6/14/2023    04:35 6/14/2023    07:55 6/14/2023    11:37 6/14/2023    22:52 6/15/2023    05:31 6/16/2023    04:49   Common Labs   Glucose 352  350  382  232  186   114  134    BUN  40  40  36  31   32  29    Creatinine  1.83  2.01  1.59  1.40   1.19  1.04    Sodium 142.2  140  135  134  133   134  135    Potassium 4.2  4.2  3.5  3.7  3.5  4.0  3.9  3.8    Chloride 102  98  98  100  99   103  102    Calcium  10.3  9.0  8.7  8.7   9.0  9.1    Albumin  4.4          Total Bilirubin  0.6          Alkaline Phosphatase  59          AST (SGOT)  20          ALT (SGPT)  18          WBC  17.40  15.15     11.73  9.80    Hemoglobin  15.4  13.1     13.1  12.7    Hematocrit  44.1  38.8     39.2  37.3    Platelets  661  561     487  429    Hemoglobin A1C  7.90                       Assessment and Plan   Diagnoses and all orders for this visit:    1. Encounter for annual wellness exam in Medicare patient (Primary)    2. Type 2 diabetes mellitus with hyperglycemia, with long-term current use of insulin  -     Ambulatory Referral to Home Health  -     Hemoglobin A1c; Future  -     Vitamin B12; Future  -     Folate; Future    3. Essential hypertension  -     Ambulatory Referral to Home Health  -     Comprehensive Metabolic Panel; Future  -     CBC & Differential; Future  -     T4, Free; Future  -     TSH; Future  -     Magnesium; Future    4. Hyperlipidemia, unspecified hyperlipidemia type  -     Lipid Panel; Future    5. Anxiety and depression  -     Ambulatory Referral to Psychiatry  -     Ambulatory Referral to Home Health    6. Vitamin D deficiency  -     Vitamin D,25-Hydroxy; Future    Other orders  -     buPROPion XL (Wellbutrin XL) 300 MG 24 hr  tablet; Take 1 tablet by mouth Every Morning.  Dispense: 90 tablet; Refill: 1  -     losartan (Cozaar) 100 MG tablet; Take 1 tablet by mouth Daily.  Dispense: 90 tablet; Refill: 1    Annual exam: Care gaps reviewed. Patient is legally blind past 32 years and has difficulty with home care.    Diabetes: Stable on Jardiance 10 mg daily and Levemir 20 units daily.     Hypertension: Blood pressure not well controlled on losartan 50 mg daily. Increase to Losartan 100 mg daily. Monitor BP and bring in log.      Hyperlipidemia: Stable on atorvastatin 40 mg daily and fenofibrate 145 mg daily and to continue.     Anxiety and depression: Not well controlled on Wellbutrin  mg daily and hydroxyzine 25 mg 1-2 tabs twice daily as needed. Increase Wellbutrin  mg daily. Referral placed.      Vitamin D deficiency:  Stable on vitamin D 50,000 units weekly and to continue.         Follow Up   Return in about 3 months (around 4/30/2024) for Recheck.  Patient was given instructions and counseling regarding her condition or for health maintenance advice. Please see specific information pulled into the AVS if appropriate.

## 2024-01-30 ENCOUNTER — OFFICE VISIT (OUTPATIENT)
Dept: INTERNAL MEDICINE | Facility: CLINIC | Age: 44
End: 2024-01-30
Payer: MEDICARE

## 2024-01-30 VITALS
BODY MASS INDEX: 33.46 KG/M2 | WEIGHT: 196 LBS | HEART RATE: 89 BPM | OXYGEN SATURATION: 97 % | DIASTOLIC BLOOD PRESSURE: 92 MMHG | HEIGHT: 64 IN | TEMPERATURE: 97.1 F | SYSTOLIC BLOOD PRESSURE: 154 MMHG

## 2024-01-30 DIAGNOSIS — Z00.00 ENCOUNTER FOR ANNUAL WELLNESS EXAM IN MEDICARE PATIENT: Primary | ICD-10-CM

## 2024-01-30 DIAGNOSIS — F32.A ANXIETY AND DEPRESSION: Chronic | ICD-10-CM

## 2024-01-30 DIAGNOSIS — E55.9 VITAMIN D DEFICIENCY: ICD-10-CM

## 2024-01-30 DIAGNOSIS — F41.9 ANXIETY AND DEPRESSION: Chronic | ICD-10-CM

## 2024-01-30 DIAGNOSIS — I10 ESSENTIAL HYPERTENSION: Chronic | ICD-10-CM

## 2024-01-30 DIAGNOSIS — E78.5 HYPERLIPIDEMIA, UNSPECIFIED HYPERLIPIDEMIA TYPE: Chronic | ICD-10-CM

## 2024-01-30 DIAGNOSIS — Z79.4 TYPE 2 DIABETES MELLITUS WITH HYPERGLYCEMIA, WITH LONG-TERM CURRENT USE OF INSULIN: Chronic | ICD-10-CM

## 2024-01-30 DIAGNOSIS — E11.65 TYPE 2 DIABETES MELLITUS WITH HYPERGLYCEMIA, WITH LONG-TERM CURRENT USE OF INSULIN: Chronic | ICD-10-CM

## 2024-01-30 PROCEDURE — 1160F RVW MEDS BY RX/DR IN RCRD: CPT | Performed by: NURSE PRACTITIONER

## 2024-01-30 PROCEDURE — G0438 PPPS, INITIAL VISIT: HCPCS | Performed by: NURSE PRACTITIONER

## 2024-01-30 PROCEDURE — 1170F FXNL STATUS ASSESSED: CPT | Performed by: NURSE PRACTITIONER

## 2024-01-30 PROCEDURE — 1159F MED LIST DOCD IN RCRD: CPT | Performed by: NURSE PRACTITIONER

## 2024-01-30 PROCEDURE — 99214 OFFICE O/P EST MOD 30 MIN: CPT | Performed by: NURSE PRACTITIONER

## 2024-01-30 RX ORDER — BUPROPION HYDROCHLORIDE 300 MG/1
300 TABLET ORAL EVERY MORNING
Qty: 90 TABLET | Refills: 1 | Status: SHIPPED | OUTPATIENT
Start: 2024-01-30

## 2024-01-30 RX ORDER — LOSARTAN POTASSIUM 100 MG/1
100 TABLET ORAL DAILY
Qty: 90 TABLET | Refills: 1 | Status: SHIPPED | OUTPATIENT
Start: 2024-01-30

## 2024-01-31 RX ORDER — LOSARTAN POTASSIUM 50 MG/1
50 TABLET ORAL DAILY
Qty: 90 TABLET | Refills: 1 | OUTPATIENT
Start: 2024-01-31

## 2024-01-31 RX ORDER — BUPROPION HYDROCHLORIDE 150 MG/1
150 TABLET ORAL DAILY
Qty: 90 TABLET | Refills: 1 | OUTPATIENT
Start: 2024-01-31

## 2024-01-31 RX ORDER — EMPAGLIFLOZIN 10 MG/1
TABLET, FILM COATED ORAL
Qty: 30 TABLET | Refills: 0 | Status: SHIPPED | OUTPATIENT
Start: 2024-01-31

## 2024-02-01 ENCOUNTER — TELEPHONE (OUTPATIENT)
Dept: INTERNAL MEDICINE | Facility: CLINIC | Age: 44
End: 2024-02-01
Payer: MEDICARE

## 2024-02-01 NOTE — TELEPHONE ENCOUNTER
Desean from [facility] called wanted to inform the order was received but they cannot see pt until Sunday 2/4/2024 if that's okay. Pls call back and advise

## 2024-02-03 ENCOUNTER — LAB (OUTPATIENT)
Dept: LAB | Facility: HOSPITAL | Age: 44
End: 2024-02-03
Payer: MEDICARE

## 2024-02-03 DIAGNOSIS — E78.5 HYPERLIPIDEMIA, UNSPECIFIED HYPERLIPIDEMIA TYPE: ICD-10-CM

## 2024-02-03 DIAGNOSIS — E11.65 TYPE 2 DIABETES MELLITUS WITH HYPERGLYCEMIA, WITH LONG-TERM CURRENT USE OF INSULIN: Chronic | ICD-10-CM

## 2024-02-03 DIAGNOSIS — I10 ESSENTIAL HYPERTENSION: ICD-10-CM

## 2024-02-03 DIAGNOSIS — Z79.4 TYPE 2 DIABETES MELLITUS WITH HYPERGLYCEMIA, WITH LONG-TERM CURRENT USE OF INSULIN: Chronic | ICD-10-CM

## 2024-02-03 DIAGNOSIS — E55.9 VITAMIN D DEFICIENCY: ICD-10-CM

## 2024-02-03 LAB
25(OH)D3 SERPL-MCNC: 26.7 NG/ML (ref 30–100)
ALBUMIN SERPL-MCNC: 4.2 G/DL (ref 3.5–5.2)
ALBUMIN/GLOB SERPL: 1.4 G/DL
ALP SERPL-CCNC: 63 U/L (ref 39–117)
ALT SERPL W P-5'-P-CCNC: 38 U/L (ref 1–33)
ANION GAP SERPL CALCULATED.3IONS-SCNC: 8 MMOL/L (ref 5–15)
AST SERPL-CCNC: 39 U/L (ref 1–32)
BASOPHILS # BLD AUTO: 0.08 10*3/MM3 (ref 0–0.2)
BASOPHILS NFR BLD AUTO: 0.8 % (ref 0–1.5)
BILIRUB SERPL-MCNC: 0.2 MG/DL (ref 0–1.2)
BUN SERPL-MCNC: 21 MG/DL (ref 6–20)
BUN/CREAT SERPL: 15.8 (ref 7–25)
CALCIUM SPEC-SCNC: 9.6 MG/DL (ref 8.6–10.5)
CHLORIDE SERPL-SCNC: 103 MMOL/L (ref 98–107)
CHOLEST SERPL-MCNC: 162 MG/DL (ref 0–200)
CO2 SERPL-SCNC: 27 MMOL/L (ref 22–29)
CREAT SERPL-MCNC: 1.33 MG/DL (ref 0.57–1)
DEPRECATED RDW RBC AUTO: 42.4 FL (ref 37–54)
EGFRCR SERPLBLD CKD-EPI 2021: 51 ML/MIN/1.73
EOSINOPHIL # BLD AUTO: 0.27 10*3/MM3 (ref 0–0.4)
EOSINOPHIL NFR BLD AUTO: 2.8 % (ref 0.3–6.2)
ERYTHROCYTE [DISTWIDTH] IN BLOOD BY AUTOMATED COUNT: 13.5 % (ref 12.3–15.4)
FOLATE SERPL-MCNC: 4.63 NG/ML (ref 4.78–24.2)
GLOBULIN UR ELPH-MCNC: 3.1 GM/DL
GLUCOSE SERPL-MCNC: 201 MG/DL (ref 65–99)
HBA1C MFR BLD: 8.6 % (ref 4.8–5.6)
HCT VFR BLD AUTO: 40.6 % (ref 34–46.6)
HDLC SERPL-MCNC: 45 MG/DL (ref 40–60)
HGB BLD-MCNC: 12.8 G/DL (ref 12–15.9)
IMM GRANULOCYTES # BLD AUTO: 0.03 10*3/MM3 (ref 0–0.05)
IMM GRANULOCYTES NFR BLD AUTO: 0.3 % (ref 0–0.5)
LDLC SERPL CALC-MCNC: 87 MG/DL (ref 0–100)
LDLC/HDLC SERPL: 1.82 {RATIO}
LYMPHOCYTES # BLD AUTO: 2.72 10*3/MM3 (ref 0.7–3.1)
LYMPHOCYTES NFR BLD AUTO: 28.1 % (ref 19.6–45.3)
MAGNESIUM SERPL-MCNC: 2.2 MG/DL (ref 1.6–2.6)
MCH RBC QN AUTO: 27.2 PG (ref 26.6–33)
MCHC RBC AUTO-ENTMCNC: 31.5 G/DL (ref 31.5–35.7)
MCV RBC AUTO: 86.2 FL (ref 79–97)
MONOCYTES # BLD AUTO: 0.49 10*3/MM3 (ref 0.1–0.9)
MONOCYTES NFR BLD AUTO: 5.1 % (ref 5–12)
NEUTROPHILS NFR BLD AUTO: 6.1 10*3/MM3 (ref 1.7–7)
NEUTROPHILS NFR BLD AUTO: 62.9 % (ref 42.7–76)
NRBC BLD AUTO-RTO: 0 /100 WBC (ref 0–0.2)
PLATELET # BLD AUTO: 375 10*3/MM3 (ref 140–450)
PMV BLD AUTO: 10 FL (ref 6–12)
POTASSIUM SERPL-SCNC: 4.6 MMOL/L (ref 3.5–5.2)
PROT SERPL-MCNC: 7.3 G/DL (ref 6–8.5)
RBC # BLD AUTO: 4.71 10*6/MM3 (ref 3.77–5.28)
SODIUM SERPL-SCNC: 138 MMOL/L (ref 136–145)
T4 FREE SERPL-MCNC: 0.99 NG/DL (ref 0.93–1.7)
TRIGL SERPL-MCNC: 175 MG/DL (ref 0–150)
TSH SERPL DL<=0.05 MIU/L-ACNC: 1.89 UIU/ML (ref 0.27–4.2)
VIT B12 BLD-MCNC: 847 PG/ML (ref 211–946)
VLDLC SERPL-MCNC: 30 MG/DL (ref 5–40)
WBC NRBC COR # BLD AUTO: 9.69 10*3/MM3 (ref 3.4–10.8)

## 2024-02-03 PROCEDURE — 82746 ASSAY OF FOLIC ACID SERUM: CPT

## 2024-02-03 PROCEDURE — 84443 ASSAY THYROID STIM HORMONE: CPT

## 2024-02-03 PROCEDURE — 82306 VITAMIN D 25 HYDROXY: CPT

## 2024-02-03 PROCEDURE — 80061 LIPID PANEL: CPT

## 2024-02-03 PROCEDURE — 83735 ASSAY OF MAGNESIUM: CPT

## 2024-02-03 PROCEDURE — 84439 ASSAY OF FREE THYROXINE: CPT

## 2024-02-03 PROCEDURE — 80053 COMPREHEN METABOLIC PANEL: CPT

## 2024-02-03 PROCEDURE — 82607 VITAMIN B-12: CPT

## 2024-02-03 PROCEDURE — 83036 HEMOGLOBIN GLYCOSYLATED A1C: CPT

## 2024-02-03 PROCEDURE — 85025 COMPLETE CBC W/AUTO DIFF WBC: CPT

## 2024-02-05 DIAGNOSIS — I10 ESSENTIAL HYPERTENSION: ICD-10-CM

## 2024-02-05 DIAGNOSIS — E11.65 TYPE 2 DIABETES MELLITUS WITH HYPERGLYCEMIA, WITH LONG-TERM CURRENT USE OF INSULIN: Primary | ICD-10-CM

## 2024-02-05 DIAGNOSIS — E53.8 FOLATE DEFICIENCY: ICD-10-CM

## 2024-02-05 DIAGNOSIS — Z79.4 TYPE 2 DIABETES MELLITUS WITH HYPERGLYCEMIA, WITH LONG-TERM CURRENT USE OF INSULIN: Primary | ICD-10-CM

## 2024-02-05 DIAGNOSIS — E55.9 VITAMIN D DEFICIENCY: ICD-10-CM

## 2024-02-05 DIAGNOSIS — E78.5 HYPERLIPIDEMIA, UNSPECIFIED HYPERLIPIDEMIA TYPE: ICD-10-CM

## 2024-02-05 RX ORDER — FOLIC ACID 1 MG/1
1 TABLET ORAL DAILY
Qty: 90 TABLET | Refills: 0 | Status: SHIPPED | OUTPATIENT
Start: 2024-02-05 | End: 2024-02-12 | Stop reason: SDUPTHER

## 2024-02-07 RX ORDER — PEN NEEDLE, DIABETIC 32GX 5/32"
1 NEEDLE, DISPOSABLE MISCELLANEOUS DAILY
Qty: 100 EACH | Refills: 0 | Status: SHIPPED | OUTPATIENT
Start: 2024-02-07 | End: 2024-02-12 | Stop reason: SDUPTHER

## 2024-02-07 RX ORDER — FOLIC ACID 1 MG/1
1 TABLET ORAL DAILY
Qty: 90 TABLET | Refills: 0 | OUTPATIENT
Start: 2024-02-07

## 2024-02-08 ENCOUNTER — TELEPHONE (OUTPATIENT)
Dept: INTERNAL MEDICINE | Facility: CLINIC | Age: 44
End: 2024-02-08
Payer: MEDICARE

## 2024-02-08 NOTE — TELEPHONE ENCOUNTER
Called and LM for the patient asking if she would like home health. It looks like they've tried multiple times to contact her with no luck. Just need to know if she wants home health or if I need to cancel the referral.     HUB RELAY MESSAGE

## 2024-02-09 ENCOUNTER — TELEPHONE (OUTPATIENT)
Dept: INTERNAL MEDICINE | Facility: CLINIC | Age: 44
End: 2024-02-09
Payer: MEDICARE

## 2024-02-09 NOTE — TELEPHONE ENCOUNTER
HUB MAY RELAY TO PATIENT.       ----- Message from SHAE Mcgee sent at 2/5/2024  6:17 AM EST -----  Please call the patient and advise of abnormal results.Folate level low and sent supplement to pharmacy to start. Vitamin D and lipid panel are improved. Glucose not well controlled. Kidney function is impaired. Increase insulin additional 10 units per day. Labs need to be done before next appointment.

## 2024-02-12 DIAGNOSIS — K21.9 GASTROESOPHAGEAL REFLUX DISEASE WITHOUT ESOPHAGITIS: ICD-10-CM

## 2024-02-12 RX ORDER — FAMOTIDINE 20 MG/1
20 TABLET, FILM COATED ORAL
Qty: 30 TABLET | Refills: 1 | Status: SHIPPED | OUTPATIENT
Start: 2024-02-12

## 2024-02-12 RX ORDER — PEN NEEDLE, DIABETIC 32GX 5/32"
1 NEEDLE, DISPOSABLE MISCELLANEOUS DAILY
Qty: 100 EACH | Refills: 0 | Status: SHIPPED | OUTPATIENT
Start: 2024-02-12

## 2024-02-12 RX ORDER — FOLIC ACID 1 MG/1
1 TABLET ORAL DAILY
Qty: 90 TABLET | Refills: 0 | Status: SHIPPED | OUTPATIENT
Start: 2024-02-12

## 2024-02-16 RX ORDER — LOSARTAN POTASSIUM 50 MG/1
50 TABLET ORAL DAILY
Qty: 90 TABLET | Refills: 1 | OUTPATIENT
Start: 2024-02-16

## 2024-02-16 RX ORDER — HYDROXYZINE HYDROCHLORIDE 25 MG/1
TABLET, FILM COATED ORAL
Qty: 360 TABLET | Refills: 1 | Status: SHIPPED | OUTPATIENT
Start: 2024-02-16

## 2024-02-16 RX ORDER — BUPROPION HYDROCHLORIDE 150 MG/1
150 TABLET ORAL DAILY
Qty: 90 TABLET | Refills: 1 | OUTPATIENT
Start: 2024-02-16

## 2024-03-02 DIAGNOSIS — R11.0 NAUSEA: ICD-10-CM

## 2024-03-04 RX ORDER — FENOFIBRATE 145 MG/1
TABLET, COATED ORAL
Qty: 90 TABLET | Refills: 1 | Status: SHIPPED | OUTPATIENT
Start: 2024-03-04

## 2024-03-04 RX ORDER — ATORVASTATIN CALCIUM 40 MG/1
TABLET, FILM COATED ORAL
Qty: 90 TABLET | Refills: 1 | Status: SHIPPED | OUTPATIENT
Start: 2024-03-04

## 2024-03-04 RX ORDER — PROMETHAZINE HYDROCHLORIDE 25 MG/1
25 TABLET ORAL EVERY 6 HOURS PRN
Qty: 30 TABLET | Refills: 3 | Status: SHIPPED | OUTPATIENT
Start: 2024-03-04

## 2024-03-04 NOTE — TELEPHONE ENCOUNTER
Patient is requesting a refill of promethazine 25mg, medication is pended      Last OV: 12/28/2023  Last refill: 12/4/2023  Next OV: 7/2/2024

## 2024-03-11 RX ORDER — EMPAGLIFLOZIN 10 MG/1
TABLET, FILM COATED ORAL
Qty: 30 TABLET | Refills: 0 | Status: SHIPPED | OUTPATIENT
Start: 2024-03-11

## 2024-03-24 DIAGNOSIS — K21.9 GASTROESOPHAGEAL REFLUX DISEASE WITHOUT ESOPHAGITIS: ICD-10-CM

## 2024-03-25 RX ORDER — PANTOPRAZOLE SODIUM 40 MG/1
40 TABLET, DELAYED RELEASE ORAL
Qty: 180 TABLET | Refills: 1 | Status: SHIPPED | OUTPATIENT
Start: 2024-03-25

## 2024-04-14 NOTE — PROGRESS NOTES
Chief Complaint  ITCHING (THE PATIENT STATES SHE HAS A BREAK OUT ON THE BACK OF HER NECK AND ARMS. SHE STATES THAT THIS STARTED ABOUT A WEEK AGO, SHE STATES IT WAS ITCHING BUT NOT ANYMORE. THE PATIENT STATES SHE IS ALSO HAVING SIDE PAIN, SHE SAYS IT FEELS LIEK A BAD SPASM. )  Subjective      History of Present Illness  Liyah Butts is a 43 y.o. female who presents to [facility] for an acute visit for complaint of itching of hands and neck and right side pain for one week.  Pain is worse with getting out of bed. Sitting straight up makes it better. No known injury. Benadryl is not helping. Itching resolved and back pain is improving.       Past Medical History:   Diagnosis Date    Anxiety 2021    Anxiety    Brain tumor (benign)     pituitary gland    Brain tumor (benign)     pituatary gland    Cancer     ovarian; history of    Depression 2021    Diabetes mellitus     Headache 2003    Hyperlipidemia 2018    Hypertension 2018    Obesity 1980s    Visual impairment         Past Surgical History:   Procedure Laterality Date    ENDOSCOPY N/A 6/16/2023    Procedure: ESOPHAGOGASTRODUODENOSCOPY w/ biopsy;  Surgeon: Alyssa Damon MD;  Location: [facility] ENDOSCOPY;  Service: Gastroenterology;  Laterality: N/A;  gastritis    EYE SURGERY  Feb 2022    HYSTERECTOMY          No Known Allergies       Current Outpatient Medications:     atorvastatin (LIPITOR) 40 MG tablet, TAKE 1 TABLET BY MOUTH EVERY DAY AT NIGHT, Disp: 90 tablet, Rfl: 1    B Complex-C (B-complex with vitamin C) tablet, Take 1 tablet by mouth Daily., Disp: 30 tablet, Rfl: 11    Blood Glucose Monitoring Suppl (ONE TOUCH ULTRA MINI) w/Device kit, 1 each by Other route 3 (Three) Times a Day Before Meals., Disp: 1 each, Rfl: 0    buPROPion XL (Wellbutrin XL) 300 MG 24 hr tablet, Take 1 tablet by mouth Every Morning., Disp: 90 tablet, Rfl: 1    famotidine (PEPCID) 20 MG tablet, TAKE 1 TABLET BY MOUTH EVERY DAY AT NIGHT,  Disp: 30 tablet, Rfl: 1    fenofibrate (TRICOR) 145 MG tablet, TAKE 1 TABLET BY MOUTH EVERY DAY, Disp: 90 tablet, Rfl: 1    folic acid (FOLVITE) 1 MG tablet, Take 1 tablet by mouth Daily., Disp: 90 tablet, Rfl: 0    Glucose Blood (Blood Glucose Test Strips 333) strip, 90 each by In Vitro route 3 (Three) Times a Day. Pharmacist Choice Use as directed to check blood sugars TID  DX: E11.9, Disp: 90 strip, Rfl: 3    glucose blood test strip, 1 each by Other route 3 (Three) Times a Day Before Meals. Use as instructed, Disp: 90 each, Rfl: 12    glucose monitor monitoring kit, 1 each 3 (Three) Times a Day Before Meals. Pharmacist Choice  Use as directed to check blood sugars TID  DX: E11.9, Disp: 1 each, Rfl: 0    hydrOXYzine (ATARAX) 25 MG tablet, TAKE 1-2 TABS BY MOUTH TWICE DAILY, Disp: 360 tablet, Rfl: 1    insulin detemir (Levemir FlexPen) 100 UNIT/ML injection, Inject 20 Units under the skin into the appropriate area as directed Daily., Disp: 3 mL, Rfl: 3    Insulin Pen Needle (BD Pen Needle Imelda U/F) 32G X 4 MM misc, Use 1 each Daily as directed., Disp: 100 each, Rfl: 0    Jardiance 10 MG tablet tablet, TAKE 1 TABLET BY MOUTH EVERY DAY, Disp: 30 tablet, Rfl: 0    Lancets misc, 90 each 3 (Three) Times a Day. UAD to check blood sugars TID  DX: E11.9, Disp: 90 each, Rfl: 3    linaclotide (Linzess) 145 MCG capsule capsule, Take 1 capsule by mouth Every Morning Before Breakfast., Disp: 12 capsule, Rfl: 0    linaclotide (Linzess) 72 MCG capsule capsule, Take 1 capsule by mouth Every Morning Before Breakfast., Disp: 90 capsule, Rfl: 3    losartan (Cozaar) 100 MG tablet, Take 1 tablet by mouth Daily., Disp: 90 tablet, Rfl: 1    OneTouch Delica Lancets 33G misc, 1 each by Other route 3 (Three) Times a Day Before Meals., Disp: 100 each, Rfl: 12    pantoprazole (PROTONIX) 40 MG EC tablet, TAKE 1 TABLET BY MOUTH 2 TIMES A DAY BEFORE MEALS., Disp: 180 tablet, Rfl: 1    promethazine (PHENERGAN) 25 MG tablet, TAKE 1 TABLET BY  "MOUTH EVERY 6 HOURS AS NEEDED FOR NAUSEA OR VOMITING., Disp: 30 tablet, Rfl: 3    vitamin D (ERGOCALCIFEROL) 1.25 MG (38075 UT) capsule capsule, TAKE 1 CAPSULE BY MOUTH 1 (ONE) TIME PER WEEK. TAKE WITH A FATTY MEAL, Disp: 12 capsule, Rfl: 2    Objective   /80 (BP Location: Left arm, Patient Position: Sitting, Cuff Size: Adult)   Pulse 86   Temp 97.2 °F (36.2 °C) (Skin)   Ht 162.6 cm (64.02\")   Wt 91.2 kg (201 lb)   SpO2 95%   BMI 34.48 kg/m²    Estimated body mass index is 34.48 kg/m² as calculated from the following:    Height as of this encounter: 162.6 cm (64.02\").    Weight as of this encounter: 91.2 kg (201 lb).   Physical Exam  Vitals reviewed.   Constitutional:       General: She is not in acute distress.  HENT:      Head: Normocephalic and atraumatic.   Pulmonary:      Effort: Pulmonary effort is normal.   Musculoskeletal:      Lumbar back: Tenderness present. No bony tenderness.        Back:    Skin:     Findings: Rash present. Rash is papular.      Comments: Bilateral hands   Neurological:      General: No focal deficit present.      Mental Status: She is alert.   Psychiatric:         Thought Content: Thought content normal.        Result Review :  The following data was reviewed by: SHAE Mcgee on 04/15/2024:  CMP          6/15/2023    05:31 6/16/2023    04:49 2/3/2024    11:16   CMP   Glucose 114  134  201    BUN 32  29  21    Creatinine 1.19  1.04  1.33    EGFR 58.7  69.0  51.0    Sodium 134  135  138    Potassium 3.9  3.8  4.6    Chloride 103  102  103    Calcium 9.0  9.1  9.6    Total Protein   7.3    Albumin   4.2    Globulin   3.1    Total Bilirubin   0.2    Alkaline Phosphatase   63    AST (SGOT)   39    ALT (SGPT)   38    Albumin/Globulin Ratio   1.4    BUN/Creatinine Ratio 26.9  27.9  15.8    Anion Gap 9.5  13.2  8.0      A1C Last 3 Results          5/23/2023    12:47 6/14/2023    02:04 2/3/2024    11:16   HGBA1C Last 3 Results   Hemoglobin A1C 9.50  7.90  8.60           "        Assessment and Plan   Diagnoses and all orders for this visit:    1. Type 2 diabetes mellitus with hyperglycemia, with long-term current use of insulin (Primary)  -     Miscellaneous DME    2. Folic acid deficiency    3. Muscle strain    4. Contact dermatitis, unspecified contact dermatitis type, unspecified trigger    Other orders  -     folic acid (FOLVITE) 1 MG tablet; Take 1 tablet by mouth Daily.  Dispense: 90 tablet; Refill: 0      Diabetes: Patient ha not been well controlled. Taking Jardiance 10 mg daily and Levemir 30 units daily. Recommend CGM or if not covered will need a new glucometer.      Folate deficiency: Level low. Start supplement 1 mg daily.    Muscle strain: Use of ice or heat therapy for 15 to 20 minutes several times a day and Tylenol as directed.    Contact dermatitis: Resolved.    Patient was given instructions and counseling regarding her condition. Please see specific information pulled into the AVS if appropriate.     Follow Up   If not improving or worsening the patient was instructed to follow-up.    Dictated Utilizing Dragon Dictation.  Please note that portions of this note were completed with a voice recognition program.  Part of this note may be an electronic transcription/translation of spoken language to printed text using the Dragon Dictation System.    SHAE Mcgee

## 2024-04-15 ENCOUNTER — OFFICE VISIT (OUTPATIENT)
Dept: INTERNAL MEDICINE | Age: 44
End: 2024-04-15
Payer: MEDICARE

## 2024-04-15 VITALS
HEART RATE: 86 BPM | BODY MASS INDEX: 34.31 KG/M2 | OXYGEN SATURATION: 95 % | SYSTOLIC BLOOD PRESSURE: 132 MMHG | DIASTOLIC BLOOD PRESSURE: 80 MMHG | HEIGHT: 64 IN | WEIGHT: 201 LBS | TEMPERATURE: 97.2 F

## 2024-04-15 DIAGNOSIS — T14.8XXA MUSCLE STRAIN: ICD-10-CM

## 2024-04-15 DIAGNOSIS — L25.9 CONTACT DERMATITIS, UNSPECIFIED CONTACT DERMATITIS TYPE, UNSPECIFIED TRIGGER: ICD-10-CM

## 2024-04-15 DIAGNOSIS — Z79.4 TYPE 2 DIABETES MELLITUS WITH HYPERGLYCEMIA, WITH LONG-TERM CURRENT USE OF INSULIN: Primary | ICD-10-CM

## 2024-04-15 DIAGNOSIS — E11.65 TYPE 2 DIABETES MELLITUS WITH HYPERGLYCEMIA, WITH LONG-TERM CURRENT USE OF INSULIN: Primary | ICD-10-CM

## 2024-04-15 DIAGNOSIS — E53.8 FOLIC ACID DEFICIENCY: ICD-10-CM

## 2024-04-15 PROCEDURE — 1160F RVW MEDS BY RX/DR IN RCRD: CPT | Performed by: NURSE PRACTITIONER

## 2024-04-15 PROCEDURE — 3052F HG A1C>EQUAL 8.0%<EQUAL 9.0%: CPT | Performed by: NURSE PRACTITIONER

## 2024-04-15 PROCEDURE — 99214 OFFICE O/P EST MOD 30 MIN: CPT | Performed by: NURSE PRACTITIONER

## 2024-04-15 PROCEDURE — 1159F MED LIST DOCD IN RCRD: CPT | Performed by: NURSE PRACTITIONER

## 2024-04-15 RX ORDER — FOLIC ACID 1 MG/1
1 TABLET ORAL DAILY
Qty: 90 TABLET | Refills: 0 | Status: SHIPPED | OUTPATIENT
Start: 2024-04-15

## 2024-04-15 RX ORDER — INSULIN DEGLUDEC 100 U/ML
30 INJECTION, SOLUTION SUBCUTANEOUS NIGHTLY
Qty: 15 ML | Refills: 5 | Status: SHIPPED | OUTPATIENT
Start: 2024-04-15

## 2024-04-17 ENCOUNTER — TELEPHONE (OUTPATIENT)
Dept: INTERNAL MEDICINE | Age: 44
End: 2024-04-17
Payer: MEDICARE

## 2024-04-22 ENCOUNTER — TELEPHONE (OUTPATIENT)
Dept: INTERNAL MEDICINE | Age: 44
End: 2024-04-22
Payer: MEDICARE

## 2024-04-29 NOTE — PROGRESS NOTES
Answers submitted by the patient for this visit:  Primary Reason for Visit (Submitted on 4/29/2024)  What is the primary reason for your visit?: Diabetes    Chief Complaint  Diabetes (3 month follow up.)  Subjective    History of Present Illness  Liyah Butts is a 43 y.o. female  presents to [facility] for follow-up diabetes, hypertension, hyperlipidemia, anxiety, depression and vitamin D deficiency.  The patient has not been able to get a glucometer or continuous glucose monitoring system from her pharmacy.  Pain in neck on the right side for about 1 week and feels tightness. When she rubs in it gets better.     Past Medical History:   Diagnosis Date    Anxiety 2021    Anxiety    Brain tumor (benign)     pituitary gland    Brain tumor (benign)     pituatary gland    Cancer     ovarian; history of    Depression 2021    Diabetes mellitus     GERD (gastroesophageal reflux disease) 2023    Gastritis    Headache 2003    Hyperlipidemia 2018    Hypertension 2018    Obesity 1980s    Visual impairment         Past Surgical History:   Procedure Laterality Date    ENDOSCOPY N/A 6/16/2023    Procedure: ESOPHAGOGASTRODUODENOSCOPY w/ biopsy;  Surgeon: Alyssa Damon MD;  Location: [facility] ENDOSCOPY;  Service: Gastroenterology;  Laterality: N/A;  gastritis    EYE SURGERY  Feb 2022    HYSTERECTOMY          No Known Allergies       Current Outpatient Medications:     atorvastatin (LIPITOR) 40 MG tablet, TAKE 1 TABLET BY MOUTH EVERY DAY AT NIGHT, Disp: 90 tablet, Rfl: 1    B Complex-C (B-complex with vitamin C) tablet, Take 1 tablet by mouth Daily., Disp: 30 tablet, Rfl: 11    Blood Glucose Monitoring Suppl (ONE TOUCH ULTRA MINI) w/Device kit, 1 each by Other route 3 (Three) Times a Day Before Meals., Disp: 1 each, Rfl: 0    buPROPion XL (Wellbutrin XL) 300 MG 24 hr tablet, Take 1 tablet by mouth Every Morning., Disp: 90 tablet, Rfl: 1    empagliflozin (Jardiance) 10 MG tablet tablet,  Take 1 tablet by mouth Daily., Disp: 30 tablet, Rfl: 0    famotidine (PEPCID) 20 MG tablet, TAKE 1 TABLET BY MOUTH EVERY DAY AT NIGHT, Disp: 30 tablet, Rfl: 1    fenofibrate (TRICOR) 145 MG tablet, TAKE 1 TABLET BY MOUTH EVERY DAY, Disp: 90 tablet, Rfl: 1    folic acid (FOLVITE) 1 MG tablet, Take 1 tablet by mouth Daily., Disp: 90 tablet, Rfl: 0    Glucose Blood (Blood Glucose Test Strips 333) strip, 90 each by In Vitro route 3 (Three) Times a Day. Pharmacist Choice Use as directed to check blood sugars TID  DX: E11.9, Disp: 90 strip, Rfl: 3    glucose blood test strip, 1 each by Other route 3 (Three) Times a Day Before Meals. Use as instructed, Disp: 90 each, Rfl: 12    glucose monitor monitoring kit, Use 1 each 3 (Three) Times a Day Before Meals. Pharmacist Choice  Use as directed to check blood sugars TID  DX: E11.9, Disp: 1 each, Rfl: 0    hydrOXYzine (ATARAX) 25 MG tablet, TAKE 1-2 TABS BY MOUTH TWICE DAILY, Disp: 360 tablet, Rfl: 1    insulin degludec (Tresiba FlexTouch) 100 UNIT/ML solution pen-injector injection, Inject 30 Units under the skin into the appropriate area as directed Every Night., Disp: 15 mL, Rfl: 5    Insulin Pen Needle (BD Pen Needle Imelda U/F) 32G X 4 MM misc, Use 1 each Daily as directed., Disp: 100 each, Rfl: 0    Lancets misc, Use 90 each 3 (Three) Times a Day., Disp: 90 each, Rfl: 3    linaclotide (Linzess) 145 MCG capsule capsule, Take 1 capsule by mouth Every Morning Before Breakfast., Disp: 12 capsule, Rfl: 0    linaclotide (Linzess) 72 MCG capsule capsule, Take 1 capsule by mouth Every Morning Before Breakfast., Disp: 90 capsule, Rfl: 3    losartan (Cozaar) 100 MG tablet, Take 1 tablet by mouth Daily., Disp: 90 tablet, Rfl: 1    OneTouch Delica Lancets 33G misc, 1 each by Other route 3 (Three) Times a Day Before Meals., Disp: 100 each, Rfl: 12    pantoprazole (PROTONIX) 40 MG EC tablet, TAKE 1 TABLET BY MOUTH 2 TIMES A DAY BEFORE MEALS., Disp: 180 tablet, Rfl: 1    promethazine  "(PHENERGAN) 25 MG tablet, TAKE 1 TABLET BY MOUTH EVERY 6 HOURS AS NEEDED FOR NAUSEA OR VOMITING., Disp: 30 tablet, Rfl: 3    vitamin D (ERGOCALCIFEROL) 1.25 MG (65629 UT) capsule capsule, TAKE 1 CAPSULE BY MOUTH 1 (ONE) TIME PER WEEK. TAKE WITH A FATTY MEAL, Disp: 12 capsule, Rfl: 2    Objective   /80 (BP Location: Left arm, Patient Position: Sitting, Cuff Size: Adult)   Pulse 77   Temp 97.7 °F (36.5 °C) (Skin)   Ht 162.6 cm (64.02\")   Wt 93.8 kg (206 lb 12.8 oz)   SpO2 98%   BMI 35.47 kg/m²    Estimated body mass index is 35.47 kg/m² as calculated from the following:    Height as of this encounter: 162.6 cm (64.02\").    Weight as of this encounter: 93.8 kg (206 lb 12.8 oz).   Physical Exam  Vitals reviewed.   Constitutional:       General: She is not in acute distress.  HENT:      Head: Normocephalic and atraumatic.   Neck:      Comments: No thyroid enlargement  Cardiovascular:      Rate and Rhythm: Normal rate and regular rhythm.   Pulmonary:      Effort: Pulmonary effort is normal.      Breath sounds: Normal breath sounds. No wheezing, rhonchi or rales.   Musculoskeletal:      Right lower leg: No edema.      Left lower leg: No edema.   Lymphadenopathy:      Cervical: No cervical adenopathy.   Skin:     General: Skin is warm and dry.   Neurological:      General: No focal deficit present.      Mental Status: She is alert.   Psychiatric:         Thought Content: Thought content normal.        Result Review :  The following data was reviewed by: SHAE Mcgee on 05/01/2024:  CMP          6/15/2023    05:31 6/16/2023    04:49 2/3/2024    11:16   CMP   Glucose 114  134  201    BUN 32  29  21    Creatinine 1.19  1.04  1.33    EGFR 58.7  69.0  51.0    Sodium 134  135  138    Potassium 3.9  3.8  4.6    Chloride 103  102  103    Calcium 9.0  9.1  9.6    Total Protein   7.3    Albumin   4.2    Globulin   3.1    Total Bilirubin   0.2    Alkaline Phosphatase   63    AST (SGOT)   39    ALT (SGPT)   38  "   Albumin/Globulin Ratio   1.4    BUN/Creatinine Ratio 26.9  27.9  15.8    Anion Gap 9.5  13.2  8.0      CBC w/diff          6/15/2023    05:31 6/16/2023    04:49 2/3/2024    11:16   CBC w/Diff   WBC 11.73  9.80  9.69    RBC 4.73  4.54  4.71    Hemoglobin 13.1  12.7  12.8    Hematocrit 39.2  37.3  40.6    MCV 82.9  82.2  86.2    MCH 27.7  28.0  27.2    MCHC 33.4  34.0  31.5    RDW 14.4  13.7  13.5    Platelets 487  429  375    Neutrophil Rel % 63.5   62.9    Immature Granulocyte Rel % 0.7   0.3    Lymphocyte Rel % 29.5   28.1    Monocyte Rel % 4.8   5.1    Eosinophil Rel % 0.7   2.8    Basophil Rel % 0.8   0.8      Lipid Panel          5/23/2023    12:47 2/3/2024    11:16   Lipid Panel   Total Cholesterol 389  162    Triglycerides 669  175    HDL Cholesterol 41  45    VLDL Cholesterol 148  30    LDL Cholesterol  200  87    LDL/HDL Ratio 5.22  1.82      TSH          5/23/2023    12:47 2/3/2024    11:16   TSH   TSH 1.760  1.890      A1C Last 3 Results          5/23/2023    12:47 6/14/2023    02:04 2/3/2024    11:16   HGBA1C Last 3 Results   Hemoglobin A1C 9.50  7.90  8.60      Microalbumin          5/23/2023    12:58   Microalbumin   Microalbumin, Urine >440.0                  Assessment and Plan   Diagnoses and all orders for this visit:    1. Type 2 diabetes mellitus with hyperglycemia, with long-term current use of insulin (Primary)  -     Cancel: Diabetic Supplies  -     Diabetic Supplies  -     Miscellaneous DME    2. Essential hypertension    3. Hyperlipidemia, unspecified hyperlipidemia type    4. Anxiety and depression    5. Vitamin D deficiency    6. Muscle strain    7. Need for hepatitis C screening test  -     Hepatitis C Antibody; Future    8. Encounter for screening mammogram for malignant neoplasm of breast  -     Mammo Screening Digital Tomosynthesis Bilateral With CAD; Future    Other orders  -     glucose monitor monitoring kit; Use 1 each 3 (Three) Times a Day Before Meals. Pharmacist Choice   Use as  directed to check blood sugars TID   DX: E11.9  Dispense: 1 each; Refill: 0  -     Blood Glucose Monitoring Suppl (ONE TOUCH ULTRA MINI) w/Device kit; 1 each by Other route 3 (Three) Times a Day Before Meals.  Dispense: 1 each; Refill: 0  -     Glucose Blood (Blood Glucose Test Strips 333) strip; 90 each by In Vitro route 3 (Three) Times a Day. Pharmacist Choice  Use as directed to check blood sugars TID   DX: E11.9  Dispense: 90 strip; Refill: 3  -     Lancets misc; Use 90 each 3 (Three) Times a Day.  Dispense: 90 each; Refill: 3       Diabetes: Stable on Jardiance 10 mg daily and Tresiba 30 units daily.  Need HA1C.     Hypertension: Blood pressure controlled on losartan 100 mg daily.  Check labs.     Hyperlipidemia: Stable on atorvastatin 40 mg daily and fenofibrate 145 mg daily and to continue.     Anxiety and depression: Improved on Wellbutrin  mg daily. Referral placed.      Vitamin D deficiency: Improved on vitamin D 50,000 units weekly and to continue.    Folic acid deficiency: Patient is taking folic acid 1 mg daily.  Check level.    Neck pain on the right: Advised to use heat and call if muscle relaxer needed.     Patient was given instructions and counseling regarding her condition or for health maintenance advice. Please see specific information pulled into the AVS if appropriate.     Follow Up   Return in about 3 months (around 8/1/2024) for Medicare Wellness.    Dictated Utilizing Dragon Dictation.  Please note that portions of this note were completed with a voice recognition program.  Part of this note may be an electronic transcription/translation of spoken language to printed text using the Dragon Dictation System.    SHAE Mcgee

## 2024-05-01 ENCOUNTER — OFFICE VISIT (OUTPATIENT)
Dept: INTERNAL MEDICINE | Age: 44
End: 2024-05-01
Payer: MEDICARE

## 2024-05-01 ENCOUNTER — TELEPHONE (OUTPATIENT)
Dept: INTERNAL MEDICINE | Age: 44
End: 2024-05-01

## 2024-05-01 VITALS
DIASTOLIC BLOOD PRESSURE: 80 MMHG | HEIGHT: 64 IN | WEIGHT: 206.8 LBS | OXYGEN SATURATION: 98 % | SYSTOLIC BLOOD PRESSURE: 140 MMHG | TEMPERATURE: 97.7 F | HEART RATE: 77 BPM | BODY MASS INDEX: 35.3 KG/M2

## 2024-05-01 DIAGNOSIS — Z79.4 TYPE 2 DIABETES MELLITUS WITH HYPERGLYCEMIA, WITH LONG-TERM CURRENT USE OF INSULIN: Primary | Chronic | ICD-10-CM

## 2024-05-01 DIAGNOSIS — F41.9 ANXIETY AND DEPRESSION: ICD-10-CM

## 2024-05-01 DIAGNOSIS — I10 ESSENTIAL HYPERTENSION: Chronic | ICD-10-CM

## 2024-05-01 DIAGNOSIS — E11.65 TYPE 2 DIABETES MELLITUS WITH HYPERGLYCEMIA, WITH LONG-TERM CURRENT USE OF INSULIN: Primary | ICD-10-CM

## 2024-05-01 DIAGNOSIS — I10 ESSENTIAL HYPERTENSION: ICD-10-CM

## 2024-05-01 DIAGNOSIS — E55.9 VITAMIN D DEFICIENCY: ICD-10-CM

## 2024-05-01 DIAGNOSIS — Z11.59 NEED FOR HEPATITIS C SCREENING TEST: ICD-10-CM

## 2024-05-01 DIAGNOSIS — Z12.31 ENCOUNTER FOR SCREENING MAMMOGRAM FOR MALIGNANT NEOPLASM OF BREAST: ICD-10-CM

## 2024-05-01 DIAGNOSIS — F32.A ANXIETY AND DEPRESSION: ICD-10-CM

## 2024-05-01 DIAGNOSIS — T14.8XXA MUSCLE STRAIN: ICD-10-CM

## 2024-05-01 DIAGNOSIS — E78.5 HYPERLIPIDEMIA, UNSPECIFIED HYPERLIPIDEMIA TYPE: Chronic | ICD-10-CM

## 2024-05-01 DIAGNOSIS — E11.65 TYPE 2 DIABETES MELLITUS WITH HYPERGLYCEMIA, WITH LONG-TERM CURRENT USE OF INSULIN: Primary | Chronic | ICD-10-CM

## 2024-05-01 DIAGNOSIS — Z79.4 TYPE 2 DIABETES MELLITUS WITH HYPERGLYCEMIA, WITH LONG-TERM CURRENT USE OF INSULIN: Primary | ICD-10-CM

## 2024-05-01 PROCEDURE — 3052F HG A1C>EQUAL 8.0%<EQUAL 9.0%: CPT | Performed by: NURSE PRACTITIONER

## 2024-05-01 PROCEDURE — 1159F MED LIST DOCD IN RCRD: CPT | Performed by: NURSE PRACTITIONER

## 2024-05-01 PROCEDURE — G2211 COMPLEX E/M VISIT ADD ON: HCPCS | Performed by: NURSE PRACTITIONER

## 2024-05-01 PROCEDURE — 99214 OFFICE O/P EST MOD 30 MIN: CPT | Performed by: NURSE PRACTITIONER

## 2024-05-01 PROCEDURE — 1160F RVW MEDS BY RX/DR IN RCRD: CPT | Performed by: NURSE PRACTITIONER

## 2024-05-01 RX ORDER — BLOOD-GLUCOSE METER
1 KIT MISCELLANEOUS
Qty: 1 EACH | Refills: 0 | Status: SHIPPED | OUTPATIENT
Start: 2024-05-01

## 2024-05-01 RX ORDER — BLOOD-GLUCOSE SENSOR
1 EACH MISCELLANEOUS
Qty: 1 EACH | Refills: 5 | Status: SHIPPED | OUTPATIENT
Start: 2024-05-01

## 2024-05-01 RX ORDER — LANCETS 30 GAUGE
90 EACH MISCELLANEOUS 3 TIMES DAILY
Qty: 90 EACH | Refills: 3 | Status: SHIPPED | OUTPATIENT
Start: 2024-05-01

## 2024-05-01 RX ORDER — BLOOD SUGAR DIAGNOSTIC
90 STRIP MISCELLANEOUS 3 TIMES DAILY
Qty: 90 STRIP | Refills: 3 | Status: SHIPPED | OUTPATIENT
Start: 2024-05-01

## 2024-05-01 RX ORDER — KETOROLAC TROMETHAMINE 30 MG/ML
1 INJECTION, SOLUTION INTRAMUSCULAR; INTRAVENOUS
Qty: 1 EACH | Refills: 0 | Status: SHIPPED | OUTPATIENT
Start: 2024-05-01

## 2024-05-04 ENCOUNTER — LAB (OUTPATIENT)
Dept: LAB | Facility: HOSPITAL | Age: 44
End: 2024-05-04
Payer: MEDICARE

## 2024-05-04 DIAGNOSIS — E11.65 TYPE 2 DIABETES MELLITUS WITH HYPERGLYCEMIA, WITH LONG-TERM CURRENT USE OF INSULIN: ICD-10-CM

## 2024-05-04 DIAGNOSIS — I10 ESSENTIAL HYPERTENSION: ICD-10-CM

## 2024-05-04 DIAGNOSIS — E55.9 VITAMIN D DEFICIENCY: ICD-10-CM

## 2024-05-04 DIAGNOSIS — E78.5 HYPERLIPIDEMIA, UNSPECIFIED HYPERLIPIDEMIA TYPE: ICD-10-CM

## 2024-05-04 DIAGNOSIS — E53.8 FOLATE DEFICIENCY: ICD-10-CM

## 2024-05-04 DIAGNOSIS — Z79.4 TYPE 2 DIABETES MELLITUS WITH HYPERGLYCEMIA, WITH LONG-TERM CURRENT USE OF INSULIN: ICD-10-CM

## 2024-05-04 DIAGNOSIS — Z11.59 NEED FOR HEPATITIS C SCREENING TEST: ICD-10-CM

## 2024-05-04 LAB
25(OH)D3 SERPL-MCNC: 26.2 NG/ML (ref 30–100)
ALBUMIN SERPL-MCNC: 4.1 G/DL (ref 3.5–5.2)
ALBUMIN UR-MCNC: 56.3 MG/DL
ALBUMIN/GLOB SERPL: 1.3 G/DL
ALP SERPL-CCNC: 47 U/L (ref 39–117)
ALT SERPL W P-5'-P-CCNC: 24 U/L (ref 1–33)
ANION GAP SERPL CALCULATED.3IONS-SCNC: 11 MMOL/L (ref 5–15)
AST SERPL-CCNC: 19 U/L (ref 1–32)
BASOPHILS # BLD AUTO: 0.09 10*3/MM3 (ref 0–0.2)
BASOPHILS NFR BLD AUTO: 1.1 % (ref 0–1.5)
BILIRUB SERPL-MCNC: 0.3 MG/DL (ref 0–1.2)
BUN SERPL-MCNC: 29 MG/DL (ref 6–20)
BUN/CREAT SERPL: 20.7 (ref 7–25)
CALCIUM SPEC-SCNC: 9.2 MG/DL (ref 8.6–10.5)
CHLORIDE SERPL-SCNC: 104 MMOL/L (ref 98–107)
CHOLEST SERPL-MCNC: 153 MG/DL (ref 0–200)
CO2 SERPL-SCNC: 25 MMOL/L (ref 22–29)
CREAT SERPL-MCNC: 1.4 MG/DL (ref 0.57–1)
CREAT UR-MCNC: 65 MG/DL
DEPRECATED RDW RBC AUTO: 41.3 FL (ref 37–54)
EGFRCR SERPLBLD CKD-EPI 2021: 48 ML/MIN/1.73
EOSINOPHIL # BLD AUTO: 0.23 10*3/MM3 (ref 0–0.4)
EOSINOPHIL NFR BLD AUTO: 2.7 % (ref 0.3–6.2)
ERYTHROCYTE [DISTWIDTH] IN BLOOD BY AUTOMATED COUNT: 13.2 % (ref 12.3–15.4)
FOLATE SERPL-MCNC: 14.7 NG/ML (ref 4.78–24.2)
GLOBULIN UR ELPH-MCNC: 3.2 GM/DL
GLUCOSE SERPL-MCNC: 211 MG/DL (ref 65–99)
HBA1C MFR BLD: 9.3 % (ref 4.8–5.6)
HCT VFR BLD AUTO: 39.4 % (ref 34–46.6)
HCV AB SER QL: NORMAL
HDLC SERPL-MCNC: 55 MG/DL (ref 40–60)
HGB BLD-MCNC: 12.6 G/DL (ref 12–15.9)
IMM GRANULOCYTES # BLD AUTO: 0.02 10*3/MM3 (ref 0–0.05)
IMM GRANULOCYTES NFR BLD AUTO: 0.2 % (ref 0–0.5)
LDLC SERPL CALC-MCNC: 79 MG/DL (ref 0–100)
LDLC/HDLC SERPL: 1.41 {RATIO}
LYMPHOCYTES # BLD AUTO: 2.9 10*3/MM3 (ref 0.7–3.1)
LYMPHOCYTES NFR BLD AUTO: 34.1 % (ref 19.6–45.3)
MCH RBC QN AUTO: 27.5 PG (ref 26.6–33)
MCHC RBC AUTO-ENTMCNC: 32 G/DL (ref 31.5–35.7)
MCV RBC AUTO: 85.8 FL (ref 79–97)
MICROALBUMIN/CREAT UR: 866.2 MG/G (ref 0–29)
MONOCYTES # BLD AUTO: 0.51 10*3/MM3 (ref 0.1–0.9)
MONOCYTES NFR BLD AUTO: 6 % (ref 5–12)
NEUTROPHILS NFR BLD AUTO: 4.75 10*3/MM3 (ref 1.7–7)
NEUTROPHILS NFR BLD AUTO: 55.9 % (ref 42.7–76)
NRBC BLD AUTO-RTO: 0 /100 WBC (ref 0–0.2)
PLATELET # BLD AUTO: 356 10*3/MM3 (ref 140–450)
PMV BLD AUTO: 10.3 FL (ref 6–12)
POTASSIUM SERPL-SCNC: 5 MMOL/L (ref 3.5–5.2)
PROT SERPL-MCNC: 7.3 G/DL (ref 6–8.5)
RBC # BLD AUTO: 4.59 10*6/MM3 (ref 3.77–5.28)
SODIUM SERPL-SCNC: 140 MMOL/L (ref 136–145)
T4 FREE SERPL-MCNC: 1.09 NG/DL (ref 0.93–1.7)
TRIGL SERPL-MCNC: 102 MG/DL (ref 0–150)
TSH SERPL DL<=0.05 MIU/L-ACNC: 1.27 UIU/ML (ref 0.27–4.2)
VIT B12 BLD-MCNC: 511 PG/ML (ref 211–946)
VLDLC SERPL-MCNC: 19 MG/DL (ref 5–40)
WBC NRBC COR # BLD AUTO: 8.5 10*3/MM3 (ref 3.4–10.8)

## 2024-05-04 PROCEDURE — 86803 HEPATITIS C AB TEST: CPT

## 2024-05-04 PROCEDURE — 80061 LIPID PANEL: CPT

## 2024-05-04 PROCEDURE — 82043 UR ALBUMIN QUANTITATIVE: CPT

## 2024-05-04 PROCEDURE — 82746 ASSAY OF FOLIC ACID SERUM: CPT

## 2024-05-04 PROCEDURE — 84443 ASSAY THYROID STIM HORMONE: CPT

## 2024-05-04 PROCEDURE — 82306 VITAMIN D 25 HYDROXY: CPT

## 2024-05-04 PROCEDURE — 36415 COLL VENOUS BLD VENIPUNCTURE: CPT

## 2024-05-04 PROCEDURE — 82570 ASSAY OF URINE CREATININE: CPT

## 2024-05-04 PROCEDURE — 83036 HEMOGLOBIN GLYCOSYLATED A1C: CPT

## 2024-05-04 PROCEDURE — 84439 ASSAY OF FREE THYROXINE: CPT

## 2024-05-04 PROCEDURE — 80053 COMPREHEN METABOLIC PANEL: CPT

## 2024-05-04 PROCEDURE — 82607 VITAMIN B-12: CPT

## 2024-05-04 PROCEDURE — 85025 COMPLETE CBC W/AUTO DIFF WBC: CPT

## 2024-05-05 DIAGNOSIS — E11.65 TYPE 2 DIABETES MELLITUS WITH HYPERGLYCEMIA, WITH LONG-TERM CURRENT USE OF INSULIN: ICD-10-CM

## 2024-05-05 DIAGNOSIS — E78.5 HYPERLIPIDEMIA, UNSPECIFIED HYPERLIPIDEMIA TYPE: ICD-10-CM

## 2024-05-05 DIAGNOSIS — Z79.4 TYPE 2 DIABETES MELLITUS WITH HYPERGLYCEMIA, WITH LONG-TERM CURRENT USE OF INSULIN: ICD-10-CM

## 2024-05-05 DIAGNOSIS — N18.9 CHRONIC KIDNEY DISEASE, UNSPECIFIED CKD STAGE: Primary | ICD-10-CM

## 2024-05-05 DIAGNOSIS — I10 ESSENTIAL HYPERTENSION: ICD-10-CM

## 2024-05-09 ENCOUNTER — TELEPHONE (OUTPATIENT)
Dept: INTERNAL MEDICINE | Age: 44
End: 2024-05-09

## 2024-05-09 NOTE — TELEPHONE ENCOUNTER
Caller: HERNANDEZ    [place] [facility] [place] back number: [phone]     Patient is needing: HERNANDEZ IS REQUESTING OFFICE NOTES, DEMOGRAPHICS AND COPY OF INSURANCE CARDS TO PROCEED WITH GETTING PATIENT HER CONTINUOUS GLUCOSE MONITOR. PLEASE FAX TO: [phone]

## 2024-05-16 RX ORDER — TIZANIDINE HYDROCHLORIDE 2 MG/1
2 CAPSULE, GELATIN COATED ORAL 3 TIMES DAILY PRN
Qty: 30 CAPSULE | Refills: 1 | Status: SHIPPED | OUTPATIENT
Start: 2024-05-16 | End: 2024-05-16 | Stop reason: SDUPTHER

## 2024-05-16 RX ORDER — TIZANIDINE HYDROCHLORIDE 2 MG/1
2 CAPSULE, GELATIN COATED ORAL 3 TIMES DAILY PRN
Qty: 30 CAPSULE | Refills: 1 | Status: SHIPPED | OUTPATIENT
Start: 2024-05-16

## 2024-06-03 DIAGNOSIS — K21.9 GASTROESOPHAGEAL REFLUX DISEASE WITHOUT ESOPHAGITIS: ICD-10-CM

## 2024-06-03 DIAGNOSIS — R11.0 NAUSEA: ICD-10-CM

## 2024-06-03 RX ORDER — ATORVASTATIN CALCIUM 40 MG/1
40 TABLET, FILM COATED ORAL
Qty: 90 TABLET | Refills: 1 | Status: SHIPPED | OUTPATIENT
Start: 2024-06-03

## 2024-06-03 RX ORDER — FENOFIBRATE 145 MG/1
145 TABLET, COATED ORAL DAILY
Qty: 90 TABLET | Refills: 1 | Status: SHIPPED | OUTPATIENT
Start: 2024-06-03

## 2024-06-03 RX ORDER — HYDROXYZINE HYDROCHLORIDE 25 MG/1
25 TABLET, FILM COATED ORAL EVERY 6 HOURS PRN
Qty: 360 TABLET | Refills: 1 | Status: SHIPPED | OUTPATIENT
Start: 2024-06-03

## 2024-06-03 RX ORDER — PROMETHAZINE HYDROCHLORIDE 25 MG/1
25 TABLET ORAL EVERY 6 HOURS PRN
Qty: 30 TABLET | Refills: 3 | Status: SHIPPED | OUTPATIENT
Start: 2024-06-03

## 2024-06-03 RX ORDER — PANTOPRAZOLE SODIUM 40 MG/1
40 TABLET, DELAYED RELEASE ORAL
Qty: 180 TABLET | Refills: 1 | Status: SHIPPED | OUTPATIENT
Start: 2024-06-03

## 2024-06-03 RX ORDER — LOSARTAN POTASSIUM 100 MG/1
100 TABLET ORAL DAILY
Qty: 90 TABLET | Refills: 1 | Status: SHIPPED | OUTPATIENT
Start: 2024-06-03

## 2024-06-03 RX ORDER — PEN NEEDLE, DIABETIC 32GX 5/32"
1 NEEDLE, DISPOSABLE MISCELLANEOUS DAILY
Qty: 100 EACH | Refills: 0 | Status: SHIPPED | OUTPATIENT
Start: 2024-06-03

## 2024-06-03 RX ORDER — ERGOCALCIFEROL 1.25 MG/1
50000 CAPSULE ORAL WEEKLY
Qty: 12 CAPSULE | Refills: 2 | Status: SHIPPED | OUTPATIENT
Start: 2024-06-03

## 2024-06-03 RX ORDER — MULTIVITAMIN WITH IRON
1 TABLET ORAL DAILY
Qty: 30 TABLET | Refills: 11 | Status: SHIPPED | OUTPATIENT
Start: 2024-06-03 | End: 2025-06-03

## 2024-06-03 RX ORDER — BUPROPION HYDROCHLORIDE 300 MG/1
300 TABLET ORAL EVERY MORNING
Qty: 90 TABLET | Refills: 1 | Status: SHIPPED | OUTPATIENT
Start: 2024-06-03

## 2024-06-03 RX ORDER — FAMOTIDINE 20 MG/1
20 TABLET, FILM COATED ORAL
Qty: 30 TABLET | Refills: 1 | Status: SHIPPED | OUTPATIENT
Start: 2024-06-03

## 2024-06-03 NOTE — TELEPHONE ENCOUNTER
Medication Requested Linzess 145mcg, Famotidine 20mg, promethazine 25mg, Pantoprazole 40mg    Last Refill 12/28/2024, 2/12/2024, 3/4/2024, 3/25/2024    Last OV 12/28/2023    Next OV 7/2/2024    Medication pended for approval and correct pharmacy verified Yes

## 2024-06-17 ENCOUNTER — TELEPHONE (OUTPATIENT)
Dept: INTERNAL MEDICINE | Age: 44
End: 2024-06-17
Payer: MEDICARE

## 2024-06-17 DIAGNOSIS — Z79.4 TYPE 2 DIABETES MELLITUS WITH HYPERGLYCEMIA, WITH LONG-TERM CURRENT USE OF INSULIN: ICD-10-CM

## 2024-06-17 DIAGNOSIS — E11.65 TYPE 2 DIABETES MELLITUS WITH HYPERGLYCEMIA, WITH LONG-TERM CURRENT USE OF INSULIN: ICD-10-CM

## 2024-06-17 RX ORDER — BLOOD-GLUCOSE SENSOR
1 EACH MISCELLANEOUS
Qty: 1 EACH | Refills: 5 | Status: SHIPPED | OUTPATIENT
Start: 2024-06-17

## 2024-06-17 RX ORDER — KETOROLAC TROMETHAMINE 30 MG/ML
1 INJECTION, SOLUTION INTRAMUSCULAR; INTRAVENOUS
Qty: 1 EACH | Refills: 0 | Status: SHIPPED | OUTPATIENT
Start: 2024-06-17

## 2024-06-19 DIAGNOSIS — K21.9 GASTROESOPHAGEAL REFLUX DISEASE WITHOUT ESOPHAGITIS: ICD-10-CM

## 2024-06-19 RX ORDER — FAMOTIDINE 20 MG/1
20 TABLET, FILM COATED ORAL
Qty: 90 TABLET | Refills: 0 | Status: SHIPPED | OUTPATIENT
Start: 2024-06-19

## 2024-06-24 ENCOUNTER — LAB (OUTPATIENT)
Dept: INTERNAL MEDICINE | Age: 44
End: 2024-06-24
Payer: MEDICARE

## 2024-06-24 DIAGNOSIS — Z79.4 TYPE 2 DIABETES MELLITUS WITH HYPERGLYCEMIA, WITH LONG-TERM CURRENT USE OF INSULIN: ICD-10-CM

## 2024-06-24 DIAGNOSIS — E11.65 TYPE 2 DIABETES MELLITUS WITH HYPERGLYCEMIA, WITH LONG-TERM CURRENT USE OF INSULIN: ICD-10-CM

## 2024-06-24 LAB
ALBUMIN SERPL-MCNC: 4 G/DL (ref 3.5–5.2)
ALBUMIN/GLOB SERPL: 1.4 G/DL
ALP SERPL-CCNC: 44 U/L (ref 39–117)
ALT SERPL W P-5'-P-CCNC: 22 U/L (ref 1–33)
ANION GAP SERPL CALCULATED.3IONS-SCNC: 12.5 MMOL/L (ref 5–15)
AST SERPL-CCNC: 27 U/L (ref 1–32)
BASOPHILS # BLD AUTO: 0.07 10*3/MM3 (ref 0–0.2)
BASOPHILS NFR BLD AUTO: 0.8 % (ref 0–1.5)
BILIRUB SERPL-MCNC: 0.2 MG/DL (ref 0–1.2)
BUN SERPL-MCNC: 28 MG/DL (ref 6–20)
BUN/CREAT SERPL: 18.4 (ref 7–25)
CALCIUM SPEC-SCNC: 9.3 MG/DL (ref 8.6–10.5)
CHLORIDE SERPL-SCNC: 106 MMOL/L (ref 98–107)
CHOLEST SERPL-MCNC: 138 MG/DL (ref 0–200)
CO2 SERPL-SCNC: 21.5 MMOL/L (ref 22–29)
CREAT SERPL-MCNC: 1.52 MG/DL (ref 0.57–1)
DEPRECATED RDW RBC AUTO: 41.4 FL (ref 37–54)
EGFRCR SERPLBLD CKD-EPI 2021: 43.5 ML/MIN/1.73
EOSINOPHIL # BLD AUTO: 0.24 10*3/MM3 (ref 0–0.4)
EOSINOPHIL NFR BLD AUTO: 2.7 % (ref 0.3–6.2)
ERYTHROCYTE [DISTWIDTH] IN BLOOD BY AUTOMATED COUNT: 13.6 % (ref 12.3–15.4)
FOLATE SERPL-MCNC: >20 NG/ML (ref 4.78–24.2)
GLOBULIN UR ELPH-MCNC: 2.9 GM/DL
GLUCOSE SERPL-MCNC: 101 MG/DL (ref 65–99)
HCT VFR BLD AUTO: 38.3 % (ref 34–46.6)
HDLC SERPL-MCNC: 50 MG/DL (ref 40–60)
HGB BLD-MCNC: 12.4 G/DL (ref 12–15.9)
IMM GRANULOCYTES # BLD AUTO: 0.02 10*3/MM3 (ref 0–0.05)
IMM GRANULOCYTES NFR BLD AUTO: 0.2 % (ref 0–0.5)
LDLC SERPL CALC-MCNC: 67 MG/DL (ref 0–100)
LDLC/HDLC SERPL: 1.28 {RATIO}
LYMPHOCYTES # BLD AUTO: 2.91 10*3/MM3 (ref 0.7–3.1)
LYMPHOCYTES NFR BLD AUTO: 32.4 % (ref 19.6–45.3)
MCH RBC QN AUTO: 27.2 PG (ref 26.6–33)
MCHC RBC AUTO-ENTMCNC: 32.4 G/DL (ref 31.5–35.7)
MCV RBC AUTO: 84 FL (ref 79–97)
MONOCYTES # BLD AUTO: 0.43 10*3/MM3 (ref 0.1–0.9)
MONOCYTES NFR BLD AUTO: 4.8 % (ref 5–12)
NEUTROPHILS NFR BLD AUTO: 5.3 10*3/MM3 (ref 1.7–7)
NEUTROPHILS NFR BLD AUTO: 59.1 % (ref 42.7–76)
NRBC BLD AUTO-RTO: 0 /100 WBC (ref 0–0.2)
PLATELET # BLD AUTO: 385 10*3/MM3 (ref 140–450)
PMV BLD AUTO: 10 FL (ref 6–12)
POTASSIUM SERPL-SCNC: 4.8 MMOL/L (ref 3.5–5.2)
PROT SERPL-MCNC: 6.9 G/DL (ref 6–8.5)
RBC # BLD AUTO: 4.56 10*6/MM3 (ref 3.77–5.28)
SODIUM SERPL-SCNC: 140 MMOL/L (ref 136–145)
TRIGL SERPL-MCNC: 119 MG/DL (ref 0–150)
TSH SERPL DL<=0.05 MIU/L-ACNC: 1.01 UIU/ML (ref 0.27–4.2)
VIT B12 BLD-MCNC: 630 PG/ML (ref 211–946)
VLDLC SERPL-MCNC: 21 MG/DL (ref 5–40)
WBC NRBC COR # BLD AUTO: 8.97 10*3/MM3 (ref 3.4–10.8)

## 2024-06-24 PROCEDURE — 85025 COMPLETE CBC W/AUTO DIFF WBC: CPT | Performed by: NURSE PRACTITIONER

## 2024-06-24 PROCEDURE — 82746 ASSAY OF FOLIC ACID SERUM: CPT | Performed by: NURSE PRACTITIONER

## 2024-06-24 PROCEDURE — 82607 VITAMIN B-12: CPT | Performed by: NURSE PRACTITIONER

## 2024-06-24 PROCEDURE — 80053 COMPREHEN METABOLIC PANEL: CPT | Performed by: NURSE PRACTITIONER

## 2024-06-24 PROCEDURE — 36415 COLL VENOUS BLD VENIPUNCTURE: CPT | Performed by: NURSE PRACTITIONER

## 2024-06-24 PROCEDURE — 84443 ASSAY THYROID STIM HORMONE: CPT | Performed by: NURSE PRACTITIONER

## 2024-06-24 PROCEDURE — 80061 LIPID PANEL: CPT | Performed by: NURSE PRACTITIONER

## 2024-07-02 ENCOUNTER — OFFICE VISIT (OUTPATIENT)
Dept: GASTROENTEROLOGY | Facility: CLINIC | Age: 44
End: 2024-07-02
Payer: MEDICARE

## 2024-07-02 ENCOUNTER — TELEPHONE (OUTPATIENT)
Dept: GASTROENTEROLOGY | Facility: CLINIC | Age: 44
End: 2024-07-02

## 2024-07-02 VITALS
DIASTOLIC BLOOD PRESSURE: 70 MMHG | SYSTOLIC BLOOD PRESSURE: 152 MMHG | BODY MASS INDEX: 35.27 KG/M2 | WEIGHT: 206.6 LBS | HEIGHT: 64 IN | HEART RATE: 86 BPM

## 2024-07-02 DIAGNOSIS — R11.0 NAUSEA: Primary | ICD-10-CM

## 2024-07-02 DIAGNOSIS — K21.9 GASTROESOPHAGEAL REFLUX DISEASE WITHOUT ESOPHAGITIS: ICD-10-CM

## 2024-07-02 DIAGNOSIS — K59.04 CHRONIC IDIOPATHIC CONSTIPATION: ICD-10-CM

## 2024-07-02 PROCEDURE — 1160F RVW MEDS BY RX/DR IN RCRD: CPT | Performed by: NURSE PRACTITIONER

## 2024-07-02 PROCEDURE — 1159F MED LIST DOCD IN RCRD: CPT | Performed by: NURSE PRACTITIONER

## 2024-07-02 PROCEDURE — 99214 OFFICE O/P EST MOD 30 MIN: CPT | Performed by: NURSE PRACTITIONER

## 2024-07-02 RX ORDER — EMPAGLIFLOZIN 25 MG/1
25 TABLET, FILM COATED ORAL
COMMUNITY
Start: 2024-06-04 | End: 2025-06-04

## 2024-07-02 RX ORDER — FAMOTIDINE 40 MG/1
40 TABLET, FILM COATED ORAL 2 TIMES DAILY
Qty: 60 TABLET | Refills: 3 | Status: SHIPPED | OUTPATIENT
Start: 2024-07-02

## 2024-07-02 NOTE — PROGRESS NOTES
Chief Complaint   Constipation and Heartburn    History of Present Illness       Liyah Butts is a 43 y.o. female who presents to [facility] for follow-up for GERD.  She was last seen in the office by me on 12/28/2023.    She underwent EGD with Dr. Damon on 6/16/2023.EGD showed gastritis.  Otherwise normal.     She admits she is not on Protonix.  She does not remember taking it.  She is still having a lot of breakthrough reflux.  Worse with food.     She is having issues with constipation. She is trying colace but admits its not helping. Miralax doesn't work. She admits this was never an issue until she started taking all these new medicines. She denies any rectal bleeding or melena. Good appetite.      Colonoscopy--never had one     GI family history---denies any significant      She is happy to report her bowels are moving better with LINZESS 145 daily. GERD is better but still having nausea first thing in the morning. Is taking pepcid 20 mg at night. Still doing protonix 40 mg AM.  She is continue to work on her diabetes control.  She has new patient appointment with endocrinology coming up soon.  She does see renal regularly for her history of stage III kidney disease.  Results       Result Review :                       Past Medical History       Past Medical History:   Diagnosis Date    Anxiety 2021    Anxiety    Brain tumor (benign)     pituitary gland    Brain tumor (benign)     pituatary gland    Cancer     ovarian; history of    Depression 2021    Diabetes mellitus     GERD (gastroesophageal reflux disease) 2023    Gastritis    Headache 2003    Hyperlipidemia 2018    Hypertension 2018    Obesity 1980s    Visual impairment        Past Surgical History:   Procedure Laterality Date    ENDOSCOPY N/A 06/16/2023    Procedure: ESOPHAGOGASTRODUODENOSCOPY w/ biopsy;  Surgeon: Alyssa Damon MD;  Location: [facility] ENDOSCOPY;  Service: Gastroenterology;  Laterality:  N/A;  gastritis    EYE SURGERY  Feb 2022    HYSTERECTOMY      UPPER GASTROINTESTINAL ENDOSCOPY           Current Outpatient Medications:     atorvastatin (LIPITOR) 40 MG tablet, Take 1 tablet by mouth every night at bedtime., Disp: 90 tablet, Rfl: 1    B Complex-C (B-complex with vitamin C) tablet, Take 1 tablet by mouth Daily., Disp: 30 tablet, Rfl: 11    Blood Glucose Monitoring Suppl (ONE TOUCH ULTRA MINI) w/Device kit, 1 each by Other route 3 (Three) Times a Day Before Meals., Disp: 1 each, Rfl: 0    buPROPion XL (Wellbutrin XL) 300 MG 24 hr tablet, Take 1 tablet by mouth Every Morning., Disp: 90 tablet, Rfl: 1    Continuous Glucose  (FreeStyle Lisbeth 3 [place]) device, Use 1 each Every 14 (Fourteen) Days., Disp: 1 each, Rfl: 0    Continuous Glucose Sensor (FreeStyle Lisbeth 3 Sensor) misc, Use 1 each Every 14 (Fourteen) Days., Disp: 1 each, Rfl: 5    famotidine (PEPCID) 40 MG tablet, Take 1 tablet by mouth 2 (Two) Times a Day., Disp: 60 tablet, Rfl: 3    fenofibrate (TRICOR) 145 MG tablet, Take 1 tablet by mouth Daily., Disp: 90 tablet, Rfl: 1    folic acid (FOLVITE) 1 MG tablet, Take 1 tablet by mouth Daily., Disp: 90 tablet, Rfl: 0    glucose blood test strip, 1 each by Other route 3 (Three) Times a Day Before Meals. Use as instructed, Disp: 90 each, Rfl: 12    glucose monitor monitoring kit, Use 1 each 3 (Three) Times a Day Before Meals. Pharmacist Choice  Use as directed to check blood sugars TID  DX: E11.9, Disp: 1 each, Rfl: 0    hydrOXYzine (ATARAX) 25 MG tablet, Take 1 tablet by mouth Every 6 (Six) Hours As Needed for Itching., Disp: 360 tablet, Rfl: 1    insulin degludec (Tresiba FlexTouch) 100 UNIT/ML solution pen-injector injection, Inject 30 Units under the skin into the appropriate area as directed Every Night., Disp: 15 mL, Rfl: 5    Insulin Pen Needle (BD Pen Needle Imelda U/F) 32G X 4 MM misc, Use 1 each Daily as directed., Disp: 100 each, Rfl: 0    Jardiance 25 MG tablet tablet, Take 1 tablet  by mouth., Disp: , Rfl:     linaclotide (Linzess) 145 MCG capsule capsule, Take 1 capsule by mouth Every Morning Before Breakfast., Disp: 30 capsule, Rfl: 3    losartan (Cozaar) 100 MG tablet, Take 1 tablet by mouth Daily., Disp: 90 tablet, Rfl: 1    OneTouch Delica Lancets 33G misc, 1 each by Other route 3 (Three) Times a Day Before Meals., Disp: 100 each, Rfl: 12    pantoprazole (PROTONIX) 40 MG EC tablet, Take 1 tablet by mouth 2 (Two) Times a Day Before Meals., Disp: 180 tablet, Rfl: 1    promethazine (PHENERGAN) 25 MG tablet, Take 1 tablet by mouth Every 6 (Six) Hours As Needed for Nausea or Vomiting., Disp: 30 tablet, Rfl: 3    TiZANidine (ZANAFLEX) 2 MG capsule, Take 1 capsule by mouth 3 (Three) Times a Day As Needed for Muscle Spasms., Disp: 30 capsule, Rfl: 1    vitamin D (ERGOCALCIFEROL) 1.25 MG (54503 UT) capsule capsule, Take 1 capsule by mouth 1 (One) Time Per Week. Take with a fatty meal, Disp: 12 capsule, Rfl: 2     Allergies   Allergen Reactions    Zofran [Ondansetron] Dizziness       Family History   Problem Relation Age of Onset    Arthritis Mother     Diabetes Mother     Drug abuse Mother     Heart disease Mother     Hyperlipidemia Mother         Social History     Social History Narrative    Lives with spouse    Eats at least 2 meals per day     Exercises daily- walking    Broken sleep        Objective       Review of Systems   Constitutional:  Negative for appetite change, fatigue, fever, unexpected weight gain and unexpected weight loss.   HENT:  Negative for trouble swallowing.    Respiratory:  Negative for cough, choking, chest tightness, shortness of breath, wheezing and stridor.    Cardiovascular:  Negative for chest pain, palpitations and leg swelling.   Gastrointestinal:  Positive for nausea, GERD and indigestion. Negative for abdominal distention, abdominal pain, anal bleeding, blood in stool, constipation, diarrhea, rectal pain and vomiting.        Vital Signs:   /70 (BP Location:  "Left arm, Patient Position: Sitting, Cuff Size: Adult)   Pulse 86   Ht 162.6 cm (64\")   Wt 93.7 kg (206 lb 9.6 oz)   BMI 35.46 kg/m²       Physical Exam  Constitutional:       General: She is not in acute distress.     Appearance: She is well-developed. She is not ill-appearing.   HENT:      Head: Normocephalic.   Eyes:      Pupils: Pupils are equal, round, and reactive to light.   Cardiovascular:      Rate and Rhythm: Normal rate and regular rhythm.      Heart sounds: Normal heart sounds.   Pulmonary:      Effort: Pulmonary effort is normal.      Breath sounds: Normal breath sounds.   Abdominal:      General: Bowel sounds are normal. There is no distension.      Palpations: Abdomen is soft. There is no mass.      Tenderness: There is no abdominal tenderness. There is no guarding or rebound.      Hernia: No hernia is present.   Musculoskeletal:         General: Normal range of motion.   Skin:     General: Skin is warm and dry.   Neurological:      Mental Status: She is alert and oriented to person, place, and time.   Psychiatric:         Speech: Speech normal.         Behavior: Behavior normal.         Judgment: Judgment normal.           Assessment & Plan          Assessment and Plan    Diagnoses and all orders for this visit:    1. Nausea (Primary)    2. Gastroesophageal reflux disease without esophagitis  -     famotidine (PEPCID) 40 MG tablet; Take 1 tablet by mouth 2 (Two) Times a Day.  Dispense: 60 tablet; Refill: 3    3. Chronic idiopathic constipation    She is still struggling with GERD and nausea in the mornings.  For now continue Protonix 40 mg daily.  Increase Pepcid to 40 mg twice daily.  Continue GERD precautions.  Patient to follow-up with endocrinology as planned.  Patient to follow-up with renal as planned.  Constipation seems well-controlled on Linzess 145 mcg daily.  Bowels moving well.  Patient to call the office with any issues.  Patient to follow-up with me in 6 months.  Patient is " agreeable to the plan.            Follow Up       Follow Up   Return in about 6 months (around 1/2/2025) for GERD, CONSTIPATION.  Patient was given instructions and counseling regarding her condition or for health maintenance advice. Please see specific information pulled into the AVS if appropriate.

## 2024-07-02 NOTE — TELEPHONE ENCOUNTER
Patient called the office back and verified her information, I then advised that I called to see if she could come in for a sooner appointment time due to Yvonne's request. Patient verbalized understanding and has been moved up to 2:15 pm.

## 2024-07-02 NOTE — TELEPHONE ENCOUNTER
Attempted to contact the patient to see if she could come in at a sooner time this afternoon per Yvonne's request. The patient did not answer so I left a voicemail requesting a call back.

## 2024-07-05 RX ORDER — TIZANIDINE HYDROCHLORIDE 2 MG/1
2 CAPSULE, GELATIN COATED ORAL 3 TIMES DAILY PRN
Qty: 30 CAPSULE | Refills: 1 | Status: SHIPPED | OUTPATIENT
Start: 2024-07-05

## 2024-07-05 RX ORDER — INSULIN DEGLUDEC 100 U/ML
30 INJECTION, SOLUTION SUBCUTANEOUS NIGHTLY
Qty: 15 ML | Refills: 5 | Status: SHIPPED | OUTPATIENT
Start: 2024-07-05

## 2024-07-28 DIAGNOSIS — Z79.4 TYPE 2 DIABETES MELLITUS WITH HYPERGLYCEMIA, WITH LONG-TERM CURRENT USE OF INSULIN: ICD-10-CM

## 2024-07-28 DIAGNOSIS — E11.65 TYPE 2 DIABETES MELLITUS WITH HYPERGLYCEMIA, WITH LONG-TERM CURRENT USE OF INSULIN: ICD-10-CM

## 2024-07-29 RX ORDER — PEN NEEDLE, DIABETIC 32GX 5/32"
1 NEEDLE, DISPOSABLE MISCELLANEOUS DAILY
Qty: 100 EACH | Refills: 0 | Status: SHIPPED | OUTPATIENT
Start: 2024-07-29

## 2024-07-29 RX ORDER — BLOOD-GLUCOSE SENSOR
1 EACH MISCELLANEOUS
Qty: 1 EACH | Refills: 5 | Status: SHIPPED | OUTPATIENT
Start: 2024-07-29

## 2024-07-29 RX ORDER — FOLIC ACID 1 MG/1
1 TABLET ORAL DAILY
Qty: 90 TABLET | Refills: 0 | Status: SHIPPED | OUTPATIENT
Start: 2024-07-29

## 2024-08-03 ENCOUNTER — LAB (OUTPATIENT)
Dept: LAB | Facility: HOSPITAL | Age: 44
End: 2024-08-03
Payer: MEDICARE

## 2024-08-03 DIAGNOSIS — Z79.4 TYPE 2 DIABETES MELLITUS WITH HYPERGLYCEMIA, WITH LONG-TERM CURRENT USE OF INSULIN: ICD-10-CM

## 2024-08-03 DIAGNOSIS — E11.65 TYPE 2 DIABETES MELLITUS WITH HYPERGLYCEMIA, WITH LONG-TERM CURRENT USE OF INSULIN: ICD-10-CM

## 2024-08-03 LAB
ALBUMIN UR-MCNC: 79.8 MG/DL
CREAT UR-MCNC: 79.9 MG/DL
MICROALBUMIN/CREAT UR: 998.7 MG/G (ref 0–29)

## 2024-08-03 PROCEDURE — 82570 ASSAY OF URINE CREATININE: CPT

## 2024-08-03 PROCEDURE — 82043 UR ALBUMIN QUANTITATIVE: CPT

## 2024-08-27 RX ORDER — PEN NEEDLE, DIABETIC 32GX 5/32"
1 NEEDLE, DISPOSABLE MISCELLANEOUS DAILY
Qty: 100 EACH | Refills: 0 | Status: SHIPPED | OUTPATIENT
Start: 2024-08-27

## 2024-08-28 ENCOUNTER — OFFICE VISIT (OUTPATIENT)
Dept: INTERNAL MEDICINE | Age: 44
End: 2024-08-28
Payer: MEDICARE

## 2024-08-28 VITALS
SYSTOLIC BLOOD PRESSURE: 115 MMHG | WEIGHT: 215 LBS | HEIGHT: 64 IN | OXYGEN SATURATION: 97 % | BODY MASS INDEX: 36.7 KG/M2 | HEART RATE: 78 BPM | DIASTOLIC BLOOD PRESSURE: 60 MMHG | TEMPERATURE: 97.3 F

## 2024-08-28 DIAGNOSIS — F32.A ANXIETY AND DEPRESSION: ICD-10-CM

## 2024-08-28 DIAGNOSIS — E11.65 TYPE 2 DIABETES MELLITUS WITH HYPERGLYCEMIA, WITH LONG-TERM CURRENT USE OF INSULIN: Chronic | ICD-10-CM

## 2024-08-28 DIAGNOSIS — Z00.00 ENCOUNTER FOR ANNUAL WELLNESS EXAM IN MEDICARE PATIENT: Primary | ICD-10-CM

## 2024-08-28 DIAGNOSIS — E53.8 FOLATE DEFICIENCY: ICD-10-CM

## 2024-08-28 DIAGNOSIS — E78.5 HYPERLIPIDEMIA, UNSPECIFIED HYPERLIPIDEMIA TYPE: Chronic | ICD-10-CM

## 2024-08-28 DIAGNOSIS — I10 ESSENTIAL HYPERTENSION: Chronic | ICD-10-CM

## 2024-08-28 DIAGNOSIS — N18.9 CHRONIC KIDNEY DISEASE, UNSPECIFIED CKD STAGE: ICD-10-CM

## 2024-08-28 DIAGNOSIS — F41.9 ANXIETY AND DEPRESSION: ICD-10-CM

## 2024-08-28 DIAGNOSIS — E55.9 VITAMIN D DEFICIENCY: ICD-10-CM

## 2024-08-28 DIAGNOSIS — Z79.4 TYPE 2 DIABETES MELLITUS WITH HYPERGLYCEMIA, WITH LONG-TERM CURRENT USE OF INSULIN: Chronic | ICD-10-CM

## 2024-08-28 NOTE — PROGRESS NOTES
Subjective   The ABCs of the Annual Wellness Visit  Medicare Wellness Visit      Liyah Butts is a 44 y.o. patient who presents for a Medicare Wellness Visit.    The following portions of the patient's history were reviewed and   updated as appropriate: allergies, current medications, past family history, past medical history, past social history, past surgical history, and problem list.    Compared to one year ago, the patient's physical   health is worse.  Compared to one year ago, the patient's mental   health is worse.    Recent Hospitalizations:  She was not admitted to the hospital during the last year.     Current Medical Providers:  Patient Care Team:  Mayela Bruce APRN as PCP - General (Nurse Practitioner)  Yvonne Sigala APRN as Nurse Practitioner (Nurse Practitioner)  Gerry Henry MD as Consulting Physician (Nephrology)  Miguel Lockwood DPM as Consulting Physician (Podiatry)  Sen Blanc MD as Consulting Physician (Ophthalmology)    Outpatient Medications Prior to Visit   Medication Sig Dispense Refill    Acetone, Urine, Test (Ketone Test) strip 1 strip if needed for high blood sugar (illness, vomiting). Call clinic if positive. 50 each 2    atorvastatin (LIPITOR) 40 MG tablet Take 1 tablet by mouth every night at bedtime. 90 tablet 1    B Complex-C (B-complex with vitamin C) tablet Take 1 tablet by mouth Daily. 30 tablet 11    Blood Glucose Monitoring Suppl (ONE TOUCH ULTRA MINI) w/Device kit 1 each by Other route 3 (Three) Times a Day Before Meals. 1 each 0    buPROPion XL (Wellbutrin XL) 300 MG 24 hr tablet Take 1 tablet by mouth Every Morning. 90 tablet 1    Continuous Glucose  (FreeStyle Lisbeth 3 [place]) device Use 1 each Every 14 (Fourteen) Days. 1 each 0    Continuous Glucose Sensor (FreeStyle Lisbeth 3 Sensor) misc Use 1 each Every 14 (Fourteen) Days. 1 each 5    famotidine (PEPCID) 40 MG tablet Take 1 tablet by mouth 2 (Two) Times a Day. 60 tablet 3     fenofibrate (TRICOR) 145 MG tablet Take 1 tablet by mouth Daily. 90 tablet 1    folic acid (FOLVITE) 1 MG tablet Take 1 tablet by mouth Daily. 90 tablet 0    glucose blood test strip 1 each by Other route 3 (Three) Times a Day Before Meals. Use as instructed 90 each 12    glucose monitor monitoring kit Use 1 each 3 (Three) Times a Day Before Meals. Pharmacist Choice   Use as directed to check blood sugars TID   DX: E11.9 1 each 0    hydrOXYzine (ATARAX) 25 MG tablet Take 1 tablet by mouth Every 6 (Six) Hours As Needed for Itching. 360 tablet 1    insulin aspart (NovoLOG FlexPen) 100 UNIT/ML solution pen-injector sc pen Inject 3 units before meals plus 1:50>150 as needed. MDD: 30 units 15 mL 3    insulin degludec (Tresiba FlexTouch) 100 UNIT/ML solution pen-injector injection Inject 30 Units under the skin into the appropriate area as directed Every Night. 15 mL 5    Insulin Pen Needle (BD Pen Needle Imelda 2nd Gen) 32G X 4 MM misc Use 1 each Daily as directed. 100 each 0    Jardiance 25 MG tablet tablet Take 1 tablet by mouth.      linaclotide (Linzess) 145 MCG capsule capsule Take 1 capsule by mouth Every Morning Before Breakfast. 30 capsule 3    losartan (Cozaar) 100 MG tablet Take 1 tablet by mouth Daily. 90 tablet 1    OneTouch Delica Lancets 33G misc 1 each by Other route 3 (Three) Times a Day Before Meals. 100 each 12    pantoprazole (PROTONIX) 40 MG EC tablet Take 1 tablet by mouth 2 (Two) Times a Day Before Meals. 180 tablet 1    promethazine (PHENERGAN) 25 MG tablet Take 1 tablet by mouth Every 6 (Six) Hours As Needed for Nausea or Vomiting. 30 tablet 3    TiZANidine (ZANAFLEX) 2 MG capsule Take 1 capsule by mouth 3 (Three) Times a Day As Needed for Muscle Spasms. 30 capsule 1    vitamin D (ERGOCALCIFEROL) 1.25 MG (87820 UT) capsule capsule Take 1 capsule by mouth 1 (One) Time Per Week. Take with a fatty meal 12 capsule 2    insulin degludec (Tresiba FlexTouch) 100 UNIT/ML solution pen-injector injection  "Inject 40 units under the skin every morning. Titrated as directed. MDD: 60 units 30 mL 3     No facility-administered medications prior to visit.     No opioid medication identified on active medication list. I have reviewed chart for other potential  high risk medication/s and harmful drug interactions in the elderly.      Aspirin is not on active medication list.  Aspirin use is not indicated based on review of current medical condition/s. Risk of harm outweighs potential benefits.  .    Patient Active Problem List   Diagnosis    Headache, migraine    HLD (hyperlipidemia)    Hyperprolactinemia    Macroadenoma    Obesity    Pituitary tumor    Diabetic ketoacidosis without coma associated with type 2 diabetes mellitus    Nausea and vomiting     Advance Care Planning (Click this link to access ACP Navigator)  Advance Directive is not on file.  ACP discussion was held with the patient during this visit. Patient does not have an advance directive, information provided.        Objective   Vitals:    08/28/24 1523   BP: 115/60   BP Location: Right arm   Patient Position: Sitting   Cuff Size: Large Adult   Pulse: 78   Temp: 97.3 °F (36.3 °C)   TempSrc: Temporal   SpO2: 97%   Weight: 97.5 kg (215 lb)   Height: 162.6 cm (64\")       Estimated body mass index is 36.9 kg/m² as calculated from the following:    Height as of this encounter: 162.6 cm (64\").    Weight as of this encounter: 97.5 kg (215 lb).             Does the patient have evidence of cognitive impairment? Yes  Lab Results   Component Value Date    TRIG 119 06/24/2024    HDL 50 06/24/2024    LDL 67 06/24/2024    VLDL 21 06/24/2024                                                                                                Health  Risk Assessment    Smoking Status:  Social History     Tobacco Use   Smoking Status Never   Smokeless Tobacco Never     Alcohol Consumption:  Social History     Substance and Sexual Activity   Alcohol Use No     Fall Risk " Screen:  VILMA Fall Risk Assessment was completed, and patient is at HIGH risk for falls. Assessment completed on:2024    Depression Screenin/28/2024     3:21 PM   PHQ-2/PHQ-9 Depression Screening   Little Interest or Pleasure in Doing Things 2-->more than half the days   Feeling Down, Depressed or Hopeless 2-->more than half the days   Trouble Falling or Staying Asleep, or Sleeping Too Much 3-->nearly every day   Feeling Tired or Having Little Energy 2-->more than half the days   Poor Appetite or Overeating 3-->nearly every day   Feeling Bad about Yourself - or that You are a Failure or Have Let Yourself or Your Family Down 2-->more than half the days   Trouble Concentrating on Things, Such as Reading the Newspaper or Watching Television 3-->nearly every day   Moving or Speaking So Slowly that Other People Could Have Noticed? Or the Opposite - Being So Fidgety 1-->several days   Thoughts that You Would be Better Off Dead or of Hurting Yourself in Some Way 0-->not at all   PHQ-9: Brief Depression Severity Measure Score 18   If You Checked Off Any Problems, How Difficult Have These Problems Made It For You to Do Your Work, Take Care of Things at Home, or Get Along with Other People? somewhat difficult     Health Habits and Functional and Cognitive Screenin/28/2024     3:20 PM   Functional & Cognitive Status   Do you have difficulty preparing food and eating? Yes   Do you have difficulty bathing yourself, getting dressed or grooming yourself? No   Do you have difficulty using the toilet? No   Do you have difficulty moving around from place to place? Yes   Do you have trouble with steps or getting out of a bed or a chair? Yes   Current Diet Diabetic Diet   Dental Exam Not up to date   Eye Exam Up to date   Exercise (times per week) 0 times per week   Current Exercises Include No Regular Exercise   Do you need help using the phone?  No   Are you deaf or do you have serious difficulty hearing?  No    Do you need help to go to places out of walking distance? Yes   Do you need help shopping? Yes   Do you need help preparing meals?  Yes   Do you need help with housework?  Yes   Do you need help with laundry? Yes   Do you need help taking your medications? No   Do you need help managing money? No   Do you ever drive or ride in a car without wearing a seat belt? No   Have you felt unusual stress, anger or loneliness in the last month? Yes   Who do you live with? Spouse   If you need help, do you have trouble finding someone available to you? No   Have you been bothered in the last four weeks by sexual problems? No   Do you have difficulty concentrating, remembering or making decisions? No             Age-appropriate Screening Schedule:  Refer to the list below for future screening recommendations based on patient's age, sex and/or medical conditions. Orders for these recommended tests are listed in the plan section. The patient has been provided with a written plan.    Health Maintenance List  Health Maintenance   Topic Date Due    MAMMOGRAM  Never done    Hepatitis B (1 of 3 - 19+ 3-dose series) Never done    DIABETIC EYE EXAM  03/01/2024    DIABETIC FOOT EXAM  06/21/2024    INFLUENZA VACCINE  08/01/2024    COVID-19 Vaccine (1 - 2023-24 season) 01/30/2025 (Originally 9/1/2023)    TDAP/TD VACCINES (1 - Tdap) 08/28/2025 (Originally 7/24/1999)    HEMOGLOBIN A1C  12/28/2024    BMI FOLLOWUP  01/30/2025    LIPID PANEL  06/24/2025    URINE MICROALBUMIN  08/03/2025    ANNUAL WELLNESS VISIT  08/28/2025    HEPATITIS C SCREENING  Completed    Pneumococcal Vaccine 0-64  Completed                                                                                                                                                CMS Preventative Services Quick Reference  Risk Factors Identified During Encounter  Immunizations Discussed/Encouraged: Influenza    The above risks/problems have been discussed with the patient.  Pertinent  "information has been shared with the patient in the After Visit Summary.  An After Visit Summary and PPPS were made available to the patient.    Follow Up:   Next Medicare Wellness visit to be scheduled in 1 year.         Additional E&M Note during same encounter follows:  Patient has additional, significant, and separately identifiable condition(s)/problem(s) that require work above and beyond the Medicare Wellness Visit     Chief Complaint  Medicare Wellness-subsequent (The patient is coming in for a medicare wellness, labs done in June.)    Subjective   HPI  Liyah is also being seen today for additional medical problems. Follow-up diabetes, hypertension, hyperlipidemia, anxiety, depression and vitamin D deficiency.   She is also following with nephrology. The patient is now seeing a diabetes specialist at  in [place] and was sent there by the nephrologist.           Objective   Vital Signs:  /60 (BP Location: Right arm, Patient Position: Sitting, Cuff Size: Large Adult)   Pulse 78   Temp 97.3 °F (36.3 °C) (Temporal)   Ht 162.6 cm (64\")   Wt 97.5 kg (215 lb)   SpO2 97%   BMI 36.90 kg/m²   Physical Exam  Vitals reviewed.   Constitutional:       General: She is not in acute distress.  HENT:      Head: Normocephalic and atraumatic.   Eyes:      Conjunctiva/sclera: Conjunctivae normal.   Cardiovascular:      Rate and Rhythm: Normal rate and regular rhythm.      Heart sounds: Normal heart sounds.   Pulmonary:      Effort: Pulmonary effort is normal.      Breath sounds: Normal breath sounds. No wheezing, rhonchi or rales.   Musculoskeletal:      Right lower leg: No edema.      Left lower leg: No edema.   Lymphadenopathy:      Cervical: No cervical adenopathy.   Skin:     General: Skin is warm and dry.   Neurological:      General: No focal deficit present.      Mental Status: She is alert.   Psychiatric:         Mood and Affect: Mood normal.         Thought Content: Thought content normal.         The " following data was reviewed by: SHAE Mcgee on 08/05/2024:        Assessment and Plan Additional age appropriate preventative wellness advice topics were discussed during today's preventative wellness exam(some topics already addressed during AWV portion of the note above):    Physical Activity: Advised cardiovascular activity 150 minutes per week as tolerated. (example brisk walk for 30 minutes, 5 days a week).       Encounter for annual wellness exam in Medicare patient    Type 2 diabetes mellitus with hyperglycemia, with long-term current use of insulin    Essential hypertension    Hyperlipidemia, unspecified hyperlipidemia type     Anxiety and depression    Vitamin D deficiency    Folate deficiency    Chronic kidney disease, unspecified CKD stage      Orders Placed This Encounter   Procedures    Comprehensive Metabolic Panel     Standing Status:   Future     Standing Expiration Date:   8/28/2025     Order Specific Question:   Release to patient     Answer:   Routine Release [[id number]]    Lipid Panel     Standing Status:   Future     Standing Expiration Date:   8/28/2025     Order Specific Question:   Release to patient     Answer:   Routine Release [[id number]]    Vitamin D,25-Hydroxy     Standing Status:   Future     Standing Expiration Date:   8/28/2025     Order Specific Question:   Release to patient     Answer:   Routine Release [[id number]]    Hemoglobin A1c     Standing Status:   Future     Standing Expiration Date:   8/28/2025     Order Specific Question:   Release to patient     Answer:   Routine Release [[id number]]    TSH Rfx On Abnormal To Free T4     Standing Status:   Future     Standing Expiration Date:   8/28/2025     Order Specific Question:   Release to patient     Answer:   Routine Release [[id number]]    Vitamin B12     Standing Status:   Future     Standing Expiration Date:   8/28/2025     Order Specific Question:   Release to patient     Answer:   Routine Release [[id number]]     Folate     Standing Status:   Future     Standing Expiration Date:   8/28/2025     Order Specific Question:   Release to patient     Answer:   Routine Release [[id number]]    Magnesium     Standing Status:   Future     Standing Expiration Date:   8/28/2025     Order Specific Question:   Release to patient     Answer:   Routine Release [[id number]]    CBC & Differential     Standing Status:   Future     Standing Expiration Date:   8/28/2025     Order Specific Question:   Manual Differential     Answer:   No     Order Specific Question:   Release to patient     Answer:   Routine Release [[id number]]     Annual exam: Care gaps reviewed and updated. Patient agrees to get vaccines.     Diabetes: Stable on Jardiance 25 mg daily, Tresiba 32 units daily and Novolog 3 units before meals. Uses CGM. Following specialist, SHAE Benjamin at . Last HA1C improved to 6.9.     Hypertension: Blood pressure controlled on losartan 100 mg daily.       Hyperlipidemia: Stable on atorvastatin 40 mg daily and fenofibrate 145 mg daily and to continue.     Anxiety and depression: Improved on Wellbutrin  mg daily. Referral placed.      Vitamin D deficiency: Improved on vitamin D 50,000 units weekly and to continue.     Folic acid deficiency: Patient is taking folic acid 1 mg daily.  Check level.     CKD: Worsening.  Following specialist.  Last GFR 43.5.          Follow Up   Return in about 6 months (around 2/28/2025) for Recheck.  Patient was given instructions and counseling regarding her condition or for health maintenance advice. Please see specific information pulled into the AVS if appropriate.

## 2024-09-05 RX ORDER — EMPAGLIFLOZIN 25 MG/1
25 TABLET, FILM COATED ORAL DAILY
Qty: 30 TABLET | Refills: 5 | Status: SHIPPED | OUTPATIENT
Start: 2024-09-05 | End: 2025-09-05

## 2024-09-27 DIAGNOSIS — K21.9 GASTROESOPHAGEAL REFLUX DISEASE WITHOUT ESOPHAGITIS: ICD-10-CM

## 2024-09-27 RX ORDER — FAMOTIDINE 20 MG/1
20 TABLET, FILM COATED ORAL
Qty: 90 TABLET | Refills: 0 | Status: SHIPPED | OUTPATIENT
Start: 2024-09-27

## 2024-10-01 RX ORDER — PEN NEEDLE, DIABETIC 32GX 5/32"
1 NEEDLE, DISPOSABLE MISCELLANEOUS DAILY
Qty: 100 EACH | Refills: 0 | Status: SHIPPED | OUTPATIENT
Start: 2024-10-01

## 2024-10-08 ENCOUNTER — TRANSCRIBE ORDERS (OUTPATIENT)
Dept: ADMINISTRATIVE | Facility: HOSPITAL | Age: 44
End: 2024-10-08
Payer: MEDICARE

## 2024-10-08 DIAGNOSIS — N18.31 CHRONIC KIDNEY DISEASE, STAGE 3A: Primary | ICD-10-CM

## 2024-10-08 DIAGNOSIS — E66.811 OBESITY, CLASS I, BMI 30-34.9: ICD-10-CM

## 2024-10-08 DIAGNOSIS — E11.43 TYPE 2 DIABETES MELLITUS WITH AUTONOMIC NEUROPATHY, UNSPECIFIED WHETHER LONG TERM INSULIN USE: ICD-10-CM

## 2024-10-08 DIAGNOSIS — I12.9 HYPERTENSIVE CHRONIC KIDNEY DISEASE W STG 1-4/UNSP CHR KDNY: ICD-10-CM

## 2024-10-08 DIAGNOSIS — E11.29 TYPE 2 DIABETES MELLITUS WITH OTHER DIABETIC KIDNEY COMPLICATION: ICD-10-CM

## 2024-11-07 ENCOUNTER — TELEPHONE (OUTPATIENT)
Dept: GASTROENTEROLOGY | Facility: CLINIC | Age: 44
End: 2024-11-07
Payer: MEDICARE

## 2024-11-07 NOTE — TELEPHONE ENCOUNTER
Attempted to call the patient about follow up appointment on 01/02/2025 at 3:15 pm. The provider will not be in the clinic that day so we are needing to reschedule the follow up.  Left message for the patient to call of office back.    HUB IS ABLE TO RESCHEDULE THIS PATIENT FOR NEXT APPOINTMENT.

## 2024-11-18 DIAGNOSIS — K21.9 GASTROESOPHAGEAL REFLUX DISEASE WITHOUT ESOPHAGITIS: ICD-10-CM

## 2024-11-18 RX ORDER — BUPROPION HYDROCHLORIDE 300 MG/1
300 TABLET ORAL EVERY MORNING
Qty: 90 TABLET | Refills: 1 | Status: SHIPPED | OUTPATIENT
Start: 2024-11-18

## 2024-11-18 RX ORDER — TIZANIDINE HYDROCHLORIDE 2 MG/1
2 CAPSULE, GELATIN COATED ORAL 3 TIMES DAILY PRN
Qty: 30 CAPSULE | Refills: 1 | Status: SHIPPED | OUTPATIENT
Start: 2024-11-18

## 2024-11-18 RX ORDER — FAMOTIDINE 20 MG/1
20 TABLET, FILM COATED ORAL
Qty: 90 TABLET | Refills: 0 | Status: SHIPPED | OUTPATIENT
Start: 2024-11-18

## 2024-11-18 RX ORDER — FOLIC ACID 1 MG/1
1 TABLET ORAL DAILY
Qty: 90 TABLET | Refills: 0 | Status: SHIPPED | OUTPATIENT
Start: 2024-11-18

## 2024-11-18 RX ORDER — LOSARTAN POTASSIUM 100 MG/1
100 TABLET ORAL DAILY
Qty: 90 TABLET | Refills: 1 | Status: SHIPPED | OUTPATIENT
Start: 2024-11-18

## 2024-11-25 ENCOUNTER — LAB (OUTPATIENT)
Facility: HOSPITAL | Age: 44
End: 2024-11-25
Payer: MEDICARE

## 2024-11-25 DIAGNOSIS — E11.29 TYPE 2 DIABETES MELLITUS WITH OTHER DIABETIC KIDNEY COMPLICATION: ICD-10-CM

## 2024-11-25 DIAGNOSIS — E11.43 TYPE 2 DIABETES MELLITUS WITH AUTONOMIC NEUROPATHY, UNSPECIFIED WHETHER LONG TERM INSULIN USE: ICD-10-CM

## 2024-11-25 DIAGNOSIS — N18.31 CHRONIC KIDNEY DISEASE, STAGE 3A: ICD-10-CM

## 2024-11-25 DIAGNOSIS — I12.9 HYPERTENSIVE CHRONIC KIDNEY DISEASE W STG 1-4/UNSP CHR KDNY: ICD-10-CM

## 2024-11-25 DIAGNOSIS — E66.811 OBESITY, CLASS I, BMI 30-34.9: ICD-10-CM

## 2024-11-25 LAB
ALBUMIN SERPL-MCNC: 3.7 G/DL (ref 3.5–5.2)
ALBUMIN UR-MCNC: 67.1 MG/DL
ANION GAP SERPL CALCULATED.3IONS-SCNC: 11.6 MMOL/L (ref 5–15)
BACTERIA UR QL AUTO: ABNORMAL /HPF
BILIRUB UR QL STRIP: NEGATIVE
BUN SERPL-MCNC: 25 MG/DL (ref 6–20)
BUN/CREAT SERPL: 18.9 (ref 7–25)
CALCIUM SPEC-SCNC: 9.1 MG/DL (ref 8.6–10.5)
CHLORIDE SERPL-SCNC: 106 MMOL/L (ref 98–107)
CLARITY UR: CLEAR
CO2 SERPL-SCNC: 22.4 MMOL/L (ref 22–29)
COLOR UR: YELLOW
CREAT SERPL-MCNC: 1.32 MG/DL (ref 0.57–1)
CREAT UR-MCNC: 56.3 MG/DL
CREAT UR-MCNC: 61.8 MG/DL
EGFRCR SERPLBLD CKD-EPI 2021: 51.2 ML/MIN/1.73
GLUCOSE SERPL-MCNC: 151 MG/DL (ref 65–99)
GLUCOSE UR STRIP-MCNC: ABNORMAL MG/DL
HBA1C MFR BLD: 6.7 % (ref 4.8–5.6)
HGB UR QL STRIP.AUTO: NEGATIVE
HYALINE CASTS UR QL AUTO: ABNORMAL /LPF
KETONES UR QL STRIP: NEGATIVE
LEUKOCYTE ESTERASE UR QL STRIP.AUTO: NEGATIVE
MICROALBUMIN/CREAT UR: 1191.8 MG/G (ref 0–29)
NITRITE UR QL STRIP: NEGATIVE
PH UR STRIP.AUTO: 6 [PH] (ref 5–8)
PHOSPHATE SERPL-MCNC: 4.2 MG/DL (ref 2.5–4.5)
POTASSIUM SERPL-SCNC: 4 MMOL/L (ref 3.5–5.2)
PROT ?TM UR-MCNC: 103.3 MG/DL
PROT UR QL STRIP: ABNORMAL
PROT/CREAT UR: 1.67 MG/G{CREAT}
RBC # UR STRIP: ABNORMAL /HPF
REF LAB TEST METHOD: ABNORMAL
SODIUM SERPL-SCNC: 140 MMOL/L (ref 136–145)
SP GR UR STRIP: 1.03 (ref 1–1.03)
SQUAMOUS #/AREA URNS HPF: ABNORMAL /HPF
UROBILINOGEN UR QL STRIP: ABNORMAL
WBC # UR STRIP: ABNORMAL /HPF

## 2024-11-25 PROCEDURE — 82043 UR ALBUMIN QUANTITATIVE: CPT

## 2024-11-25 PROCEDURE — 84156 ASSAY OF PROTEIN URINE: CPT

## 2024-11-25 PROCEDURE — 82570 ASSAY OF URINE CREATININE: CPT

## 2024-11-25 PROCEDURE — 83036 HEMOGLOBIN GLYCOSYLATED A1C: CPT

## 2024-11-25 PROCEDURE — 82610 CYSTATIN C: CPT

## 2024-11-25 PROCEDURE — 81001 URINALYSIS AUTO W/SCOPE: CPT

## 2024-11-25 PROCEDURE — 80069 RENAL FUNCTION PANEL: CPT

## 2024-11-25 PROCEDURE — 36415 COLL VENOUS BLD VENIPUNCTURE: CPT

## 2024-11-27 LAB
CYSTATIN C SERPL-MCNC: 1.46 MG/L (ref 0.6–1)
GFR/BSA.PRED SERPLBLD CYS-BASED-ARV: 47 ML/MIN/1.73

## 2024-12-09 DIAGNOSIS — K21.9 GASTROESOPHAGEAL REFLUX DISEASE WITHOUT ESOPHAGITIS: ICD-10-CM

## 2024-12-09 RX ORDER — FENOFIBRATE 145 MG/1
145 TABLET, COATED ORAL DAILY
Qty: 90 TABLET | Refills: 1 | Status: SHIPPED | OUTPATIENT
Start: 2024-12-09

## 2024-12-09 RX ORDER — FAMOTIDINE 40 MG/1
40 TABLET, FILM COATED ORAL 2 TIMES DAILY
Qty: 60 TABLET | Refills: 3 | Status: SHIPPED | OUTPATIENT
Start: 2024-12-09

## 2024-12-17 ENCOUNTER — HOSPITAL ENCOUNTER (EMERGENCY)
Facility: HOSPITAL | Age: 44
Discharge: HOME | End: 2024-12-17
Attending: EMERGENCY MEDICINE | Admitting: EMERGENCY MEDICINE
Payer: MEDICARE

## 2024-12-17 ENCOUNTER — APPOINTMENT (OUTPATIENT)
Dept: GENERAL RADIOLOGY | Facility: HOSPITAL | Age: 44
End: 2024-12-17
Payer: MEDICARE

## 2024-12-17 VITALS
OXYGEN SATURATION: 98 % | HEIGHT: 64 IN | BODY MASS INDEX: 36.32 KG/M2 | DIASTOLIC BLOOD PRESSURE: 78 MMHG | WEIGHT: 212.74 LBS | HEART RATE: 76 BPM | RESPIRATION RATE: 18 BRPM | TEMPERATURE: 97.9 F | SYSTOLIC BLOOD PRESSURE: 169 MMHG

## 2024-12-17 DIAGNOSIS — I15.9 SECONDARY HYPERTENSION: ICD-10-CM

## 2024-12-17 DIAGNOSIS — R07.9 CHEST PAIN, UNSPECIFIED TYPE: Primary | ICD-10-CM

## 2024-12-17 LAB
ALBUMIN SERPL-MCNC: 4.2 G/DL (ref 3.5–5.2)
ALBUMIN/GLOB SERPL: 1.2 G/DL
ALP SERPL-CCNC: 51 U/L (ref 39–117)
ALT SERPL W P-5'-P-CCNC: 26 U/L (ref 1–33)
ANION GAP SERPL CALCULATED.3IONS-SCNC: 10.6 MMOL/L (ref 5–15)
AST SERPL-CCNC: 32 U/L (ref 1–32)
BASOPHILS # BLD AUTO: 0.08 10*3/MM3 (ref 0–0.2)
BASOPHILS NFR BLD AUTO: 0.9 % (ref 0–1.5)
BILIRUB SERPL-MCNC: 0.3 MG/DL (ref 0–1.2)
BUN SERPL-MCNC: 23 MG/DL (ref 6–20)
BUN/CREAT SERPL: 16.8 (ref 7–25)
CALCIUM SPEC-SCNC: 9.6 MG/DL (ref 8.6–10.5)
CHLORIDE SERPL-SCNC: 106 MMOL/L (ref 98–107)
CO2 SERPL-SCNC: 23.4 MMOL/L (ref 22–29)
CREAT SERPL-MCNC: 1.37 MG/DL (ref 0.57–1)
DEPRECATED RDW RBC AUTO: 43.3 FL (ref 37–54)
EGFRCR SERPLBLD CKD-EPI 2021: 48.9 ML/MIN/1.73
EOSINOPHIL # BLD AUTO: 0.24 10*3/MM3 (ref 0–0.4)
EOSINOPHIL NFR BLD AUTO: 2.7 % (ref 0.3–6.2)
ERYTHROCYTE [DISTWIDTH] IN BLOOD BY AUTOMATED COUNT: 13.6 % (ref 12.3–15.4)
GEN 5 1HR TROPONIN T REFLEX: 11 NG/L
GLOBULIN UR ELPH-MCNC: 3.5 GM/DL
GLUCOSE SERPL-MCNC: 94 MG/DL (ref 65–99)
HCT VFR BLD AUTO: 41.5 % (ref 34–46.6)
HGB BLD-MCNC: 13 G/DL (ref 12–15.9)
HOLD SPECIMEN: NORMAL
HOLD SPECIMEN: NORMAL
IMM GRANULOCYTES # BLD AUTO: 0.03 10*3/MM3 (ref 0–0.05)
IMM GRANULOCYTES NFR BLD AUTO: 0.3 % (ref 0–0.5)
LIPASE SERPL-CCNC: 52 U/L (ref 13–60)
LYMPHOCYTES # BLD AUTO: 3.09 10*3/MM3 (ref 0.7–3.1)
LYMPHOCYTES NFR BLD AUTO: 34.1 % (ref 19.6–45.3)
MAGNESIUM SERPL-MCNC: 2.2 MG/DL (ref 1.6–2.6)
MCH RBC QN AUTO: 27.2 PG (ref 26.6–33)
MCHC RBC AUTO-ENTMCNC: 31.3 G/DL (ref 31.5–35.7)
MCV RBC AUTO: 86.8 FL (ref 79–97)
MONOCYTES # BLD AUTO: 0.5 10*3/MM3 (ref 0.1–0.9)
MONOCYTES NFR BLD AUTO: 5.5 % (ref 5–12)
NEUTROPHILS NFR BLD AUTO: 5.11 10*3/MM3 (ref 1.7–7)
NEUTROPHILS NFR BLD AUTO: 56.5 % (ref 42.7–76)
NRBC BLD AUTO-RTO: 0 /100 WBC (ref 0–0.2)
NT-PROBNP SERPL-MCNC: 68.1 PG/ML (ref 0–450)
PLATELET # BLD AUTO: 366 10*3/MM3 (ref 140–450)
PMV BLD AUTO: 10 FL (ref 6–12)
POTASSIUM SERPL-SCNC: 3.8 MMOL/L (ref 3.5–5.2)
PROT SERPL-MCNC: 7.7 G/DL (ref 6–8.5)
RBC # BLD AUTO: 4.78 10*6/MM3 (ref 3.77–5.28)
SODIUM SERPL-SCNC: 140 MMOL/L (ref 136–145)
TROPONIN T NUMERIC DELTA: -1 NG/L
TROPONIN T SERPL HS-MCNC: 12 NG/L
WBC NRBC COR # BLD AUTO: 9.05 10*3/MM3 (ref 3.4–10.8)
WHOLE BLOOD HOLD COAG: NORMAL
WHOLE BLOOD HOLD SPECIMEN: NORMAL

## 2024-12-17 PROCEDURE — 80053 COMPREHEN METABOLIC PANEL: CPT

## 2024-12-17 PROCEDURE — 85025 COMPLETE CBC W/AUTO DIFF WBC: CPT

## 2024-12-17 PROCEDURE — 71045 X-RAY EXAM CHEST 1 VIEW: CPT

## 2024-12-17 PROCEDURE — 93005 ELECTROCARDIOGRAM TRACING: CPT

## 2024-12-17 PROCEDURE — 93005 ELECTROCARDIOGRAM TRACING: CPT | Performed by: EMERGENCY MEDICINE

## 2024-12-17 PROCEDURE — 84484 ASSAY OF TROPONIN QUANT: CPT | Performed by: EMERGENCY MEDICINE

## 2024-12-17 PROCEDURE — 84484 ASSAY OF TROPONIN QUANT: CPT

## 2024-12-17 PROCEDURE — 99284 EMERGENCY DEPT VISIT MOD MDM: CPT

## 2024-12-17 PROCEDURE — 83735 ASSAY OF MAGNESIUM: CPT

## 2024-12-17 PROCEDURE — 83690 ASSAY OF LIPASE: CPT

## 2024-12-17 PROCEDURE — 36415 COLL VENOUS BLD VENIPUNCTURE: CPT

## 2024-12-17 PROCEDURE — 83880 ASSAY OF NATRIURETIC PEPTIDE: CPT

## 2024-12-17 RX ORDER — HYDRALAZINE HYDROCHLORIDE 20 MG/ML
10 INJECTION INTRAMUSCULAR; INTRAVENOUS ONCE
Status: DISCONTINUED | OUTPATIENT
Start: 2024-12-17 | End: 2024-12-17 | Stop reason: HOSPADM

## 2024-12-17 RX ORDER — AMLODIPINE BESYLATE 5 MG/1
5 TABLET ORAL DAILY
Qty: 20 TABLET | Refills: 0 | Status: SHIPPED | OUTPATIENT
Start: 2024-12-17

## 2024-12-17 RX ORDER — SODIUM CHLORIDE 0.9 % (FLUSH) 0.9 %
10 SYRINGE (ML) INJECTION AS NEEDED
Status: DISCONTINUED | OUTPATIENT
Start: 2024-12-17 | End: 2024-12-17 | Stop reason: HOSPADM

## 2024-12-17 RX ORDER — AMLODIPINE BESYLATE 5 MG/1
5 TABLET ORAL ONCE
Status: COMPLETED | OUTPATIENT
Start: 2024-12-17 | End: 2024-12-17

## 2024-12-17 RX ORDER — ASPIRIN 81 MG/1
324 TABLET, CHEWABLE ORAL ONCE
Status: COMPLETED | OUTPATIENT
Start: 2024-12-17 | End: 2024-12-17

## 2024-12-17 RX ADMIN — AMLODIPINE BESYLATE 5 MG: 5 TABLET ORAL at 20:11

## 2024-12-17 RX ADMIN — ASPIRIN 324 MG: 81 TABLET, CHEWABLE ORAL at 19:26

## 2024-12-18 LAB
QT INTERVAL: 393 MS
QTC INTERVAL: 447 MS

## 2024-12-18 NOTE — ED PROVIDER NOTES
Time: 7:12 PM EST  Date of encounter:  12/17/2024  Independent Historian/Clinical History and Information was obtained by:   Patient and Family    History is limited by: N/A    Chief Complaint   Patient presents with    Chest Pain     Pt arrived ambulatory to triage with c/o of increasing chest pain since 1500 today. Pt reports pain is on the left side of chest and radiates to arm. Pt denies personal cardiac hx.          History of Present Illness:  Patient is a 44 y.o. year old female who presents to the emergency department accompanied by her  for evaluation of left-sided chest pain that radiates into the bicep and under around the chest.  Has had some nausea.The pain is constant and is unchanged with activity.    Patient Care Team  Primary Care Provider: Mayela Bruce APRN    Past Medical History:     Allergies   Allergen Reactions    Zofran [Ondansetron] Dizziness     Past Medical History:   Diagnosis Date    Anxiety 2021    Anxiety    Brain tumor (benign)     pituitary gland    Brain tumor (benign)     pituatary gland    Cancer     ovarian; history of    Depression 2021    Diabetes mellitus     GERD (gastroesophageal reflux disease) 2023    Gastritis    Headache 2003    Hyperlipidemia 2018    Hypertension 2018    Obesity 1980s    Renal insufficiency 2024    Visual impairment      Past Surgical History:   Procedure Laterality Date    ENDOSCOPY N/A 06/16/2023    Procedure: ESOPHAGOGASTRODUODENOSCOPY w/ biopsy;  Surgeon: Alyssa Damon MD;  Location: [facility] ENDOSCOPY;  Service: Gastroenterology;  Laterality: N/A;  gastritis    EYE SURGERY  Feb 2022    HYSTERECTOMY      UPPER GASTROINTESTINAL ENDOSCOPY       Family History   Problem Relation Age of Onset    Arthritis Mother     Diabetes Mother     Drug abuse Mother     Heart disease Mother     Hyperlipidemia Mother     Kidney disease Paternal Uncle         Has 1 kidney and uses a cath       Home Medications:  Prior to Admission medications     Medication Sig Start Date End Date Taking? Authorizing Provider   Acetone, Urine, Test (Ketone Test) strip 1 strip if needed for high blood sugar (illness, vomiting). Call clinic if positive. 7/16/24      atorvastatin (LIPITOR) 40 MG tablet Take 1 tablet by mouth every night at bedtime. 6/3/24   Mayela Bruce APRN   B Complex-C (B-complex with vitamin C) tablet Take 1 tablet by mouth Daily. 6/3/24 6/3/25  Mayela Bruce APRN   Blood Glucose Monitoring Suppl (ONE TOUCH ULTRA MINI) w/Device kit 1 each by Other route 3 (Three) Times a Day Before Meals. 5/1/24   Mayela Bruce APRN   buPROPion XL (Wellbutrin XL) 300 MG 24 hr tablet Take 1 tablet by mouth Every Morning. 11/18/24   Mayela Bruce APRN   Continuous Glucose  (FreeStyle Lisbeth 3 [place]) device Use 1 each Every 14 (Fourteen) Days. 6/17/24   Mayela Bruce APRN   Continuous Glucose Sensor (FreeStyle Lisbeth 3 Sensor) misc Use 1 each Every 14 (Fourteen) Days. 7/29/24   Mayela Bruce APRN   famotidine (PEPCID) 20 MG tablet Take 1 tablet by mouth every night at bedtime. 11/18/24   Yvonne Sigala APRN   famotidine (PEPCID) 40 MG tablet Take 1 tablet by mouth 2 (Two) Times a Day. 12/9/24   Yvonne Sigala APRN   fenofibrate (TRICOR) 145 MG tablet Take 1 tablet by mouth Daily. 12/9/24   Mayela Bruce APRN   folic acid (FOLVITE) 1 MG tablet Take 1 tablet by mouth Daily. 11/18/24   Mayela Bruce APRN   glucose blood test strip 1 each by Other route 3 (Three) Times a Day Before Meals. Use as instructed 6/16/23   Jaciel Ponce DO   glucose monitor monitoring kit Use 1 each 3 (Three) Times a Day Before Meals. Pharmacist Choice   Use as directed to check blood sugars TID   DX: E11.9 5/1/24   Mayela Bruce APRN   hydrOXYzine (ATARAX) 25 MG tablet Take 1 tablet by mouth Every 6 (Six) Hours As Needed for Itching. 6/3/24   Mayela Bruce APRN   insulin aspart (NovoLOG FlexPen) 100 UNIT/ML solution pen-injector sc pen  Inject 3 units before meals plus 1:50>150 as needed. MDD: 30 units 7/11/24      Insulin Aspart FlexPen 100 UNIT/ML solution pen-injector Inject 4 units before meals plus 1:50>150 as needed. MDD: 30 units 12/4/24      insulin degludec (Tresiba FlexTouch) 100 UNIT/ML solution pen-injector injection Inject 30 Units under the skin into the appropriate area as directed Every Night. 7/5/24   Mayela Bruce APRN   insulin degludec (Tresiba FlexTouch) 100 UNIT/ML solution pen-injector injection Inject 32 Units under the skin into the appropriate area as directed Every Morning.Titrated as directed. MDD: 60 units 12/4/24      Insulin Pen Needle (BD Pen Needle Imelda 2nd Gen) 32G X 4 MM misc Use 1 each Daily as directed. 10/1/24   Mayela Bruce APRN   Insulin Pen Needle (BD Pen Needle Imelda 2nd Gen) 32G X 4 MM misc Inject Insulin 4 times a day 10/8/24      Jardiance 25 MG tablet tablet Take 1 tablet by mouth Daily. 9/5/24 9/5/25  Mayela Bruce APRN   linaclotide (Linzess) 145 MCG capsule capsule Take 1 capsule by mouth Every Morning Before Breakfast. 6/3/24   Yvonne Sigala APRN   losartan (Cozaar) 100 MG tablet Take 1 tablet by mouth Daily. 11/18/24   Mayela Bruce APRN   OneTouch Delica Lancets 33G misc 1 each by Other route 3 (Three) Times a Day Before Meals. 6/16/23   Jaciel Ponce DO   pantoprazole (PROTONIX) 40 MG EC tablet Take 1 tablet by mouth 2 (Two) Times a Day Before Meals. 6/3/24   Yvonne Sigala APRN   promethazine (PHENERGAN) 25 MG tablet Take 1 tablet by mouth Every 6 (Six) Hours As Needed for Nausea or Vomiting. 6/3/24   Yvonne Sigala APRN   TiZANidine (ZANAFLEX) 2 MG capsule Take 1 capsule by mouth 3 (Three) Times a Day As Needed for Muscle Spasms. 11/18/24   Teo, Mayela, APRN   vitamin D (ERGOCALCIFEROL) 1.25 MG (03308 UT) capsule capsule Take 1 capsule by mouth 1 (One) Time Per Week. Take with a fatty meal 6/3/24   Mayela Bruce APRN        Social History:  "  Social History     Tobacco Use    Smoking status: Never    Smokeless tobacco: Never   Vaping Use    Vaping status: Never Used   Substance Use Topics    Alcohol use: No    Drug use: Yes     Frequency: 7.0 times per week     Types: Marijuana     Comment: at night to sleep- delta 8         Review of Systems:  Review of Systems   Constitutional:  Negative for chills and fever.   HENT:  Negative for congestion, rhinorrhea and sore throat.    Eyes:  Negative for pain and visual disturbance.   Respiratory:  Negative for apnea, cough, chest tightness and shortness of breath.    Cardiovascular:  Positive for chest pain. Negative for palpitations.   Gastrointestinal:  Negative for abdominal pain, diarrhea, nausea and vomiting.   Genitourinary:  Negative for difficulty urinating and dysuria.   Musculoskeletal:  Negative for joint swelling and myalgias.   Skin:  Negative for color change.   Neurological:  Negative for seizures and headaches.   Psychiatric/Behavioral: Negative.     All other systems reviewed and are negative.       Physical Exam:  BP (!) 181/78   Pulse 76   Temp 97.9 °F (36.6 °C) (Oral)   Resp 18   Ht 162.6 cm (64\")   Wt 96.5 kg (212 lb 11.9 oz)   SpO2 98%   BMI 36.52 kg/m²         Physical Exam  Vitals and nursing note reviewed.   Constitutional:       General: She is not in acute distress.     Appearance: Normal appearance. She is morbidly obese. She is not toxic-appearing.   HENT:      Head: Normocephalic and atraumatic.      Jaw: There is normal jaw occlusion.      Mouth/Throat:      Mouth: Mucous membranes are moist.   Eyes:      General: Lids are normal.      Extraocular Movements: Extraocular movements intact.      Conjunctiva/sclera: Conjunctivae normal.      Pupils: Pupils are equal, round, and reactive to light.   Cardiovascular:      Rate and Rhythm: Normal rate and regular rhythm.      Pulses: Normal pulses.      Heart sounds: Normal heart sounds.   Pulmonary:      Effort: Pulmonary effort " is normal. No respiratory distress.      Breath sounds: Normal breath sounds. No wheezing or rhonchi.   Abdominal:      General: Abdomen is flat. There is no distension.      Palpations: Abdomen is soft.      Tenderness: There is no abdominal tenderness. There is no guarding or rebound.   Musculoskeletal:         General: Normal range of motion.      Cervical back: Normal range of motion and neck supple.      Right lower leg: No edema.      Left lower leg: No edema.   Skin:     General: Skin is warm and dry.   Neurological:      General: No focal deficit present.      Mental Status: She is alert and oriented to person, place, and time. Mental status is at baseline.   Psychiatric:         Mood and Affect: Mood normal.         Behavior: Behavior normal.                            Medical Decision Making:      Comorbidities that affect care:    Diabetes    External Notes reviewed:    Previous Clinic Note: Patient was last seen in clinic for diabetes      The following orders were placed and all results were independently analyzed by me:  Orders Placed This Encounter   Procedures    XR Chest 1 View    [place] Draw    High Sensitivity Troponin T    Comprehensive Metabolic Panel    Lipase    BNP    Magnesium    CBC Auto Differential    High Sensitivity Troponin T 1Hr    NPO Diet NPO Type: Strict NPO    Undress & Gown    Continuous Pulse Oximetry    Oxygen Therapy- Nasal Cannula; Titrate 1-6 LPM Per SpO2; 90 - 95%    ECG 12 Lead ED Triage Standing Order; Chest Pain    ECG 12 Lead ED Triage Standing Order; Chest Pain    Insert Peripheral IV    CBC & Differential    Green Top (Gel)    Lavender Top    Gold Top - SST    Light Blue Top       Medications Given in the Emergency Department:  Medications   sodium chloride 0.9 % flush 10 mL (has no administration in time range)   hydrALAZINE (APRESOLINE) injection 10 mg (has no administration in time range)   aspirin chewable tablet 324 mg (324 mg Oral Given 12/17/24 1926)    amLODIPine (NORVASC) tablet 5 mg (5 mg Oral Given 12/17/24 2011)        ED Course:    The patient was initially evaluated in the triage area where orders were placed. The patient was later dispositioned by Ai Shipley MD.      The patient was advised to stay for completion of workup which includes but is not limited to communication of labs and radiological results, reassessment and plan. The patient was advised that leaving prior to disposition by a provider could result in critical findings that are not communicated to the patient.          Labs:    Lab Results (last 24 hours)       Procedure Component Value Units Date/Time    High Sensitivity Troponin T [[id number]]  (Normal) Collected: 12/17/24 1905    Specimen: Blood from Arm, Right Updated: 12/17/24 1947     HS Troponin T 12 ng/L     Narrative:      High Sensitive Troponin T Reference Range:  <14.0 ng/L- Negative Female for AMI  <22.0 ng/L- Negative Male for AMI  >=14 - Abnormal Female indicating possible myocardial injury.  >=22 - Abnormal Male indicating possible myocardial injury.   Clinicians would have to utilize clinical acumen, EKG, Troponin, and serial changes to determine if it is an Acute Myocardial Infarction or myocardial injury due to an underlying chronic condition.         CBC & Differential [[id number]]  (Abnormal) Collected: 12/17/24 1905    Specimen: Blood from Arm, Right Updated: 12/17/24 1929    Narrative:      The following orders were created for panel order CBC & Differential.  Procedure                               Abnormality         Status                     ---------                               -----------         ------                     CBC Auto Differential[[id number]]        Abnormal            Final result                 Please view results for these tests on the individual orders.    Comprehensive Metabolic Panel [[id number]]  (Abnormal) Collected: 12/17/24 1905    Specimen: Blood from Arm, Right Updated: 12/17/24  1947     Glucose 94 mg/dL      BUN 23 mg/dL      Creatinine 1.37 mg/dL      Sodium 140 mmol/L      Potassium 3.8 mmol/L      Chloride 106 mmol/L      CO2 23.4 mmol/L      Calcium 9.6 mg/dL      Total Protein 7.7 g/dL      Albumin 4.2 g/dL      ALT (SGPT) 26 U/L      AST (SGOT) 32 U/L      Alkaline Phosphatase 51 U/L      Total Bilirubin 0.3 mg/dL      Globulin 3.5 gm/dL      A/G Ratio 1.2 g/dL      BUN/Creatinine Ratio 16.8     Anion Gap 10.6 mmol/L      eGFR 48.9 mL/min/1.73     Narrative:      GFR Categories in Chronic Kidney Disease (CKD)      GFR Category          GFR (mL/min/1.73)    Interpretation  G1                     90 or greater         Normal or high (1)  G2                      60-89                Mild decrease (1)  G3a                   45-59                Mild to moderate decrease  G3b                   30-44                Moderate to severe decrease  G4                    15-29                Severe decrease  G5                    14 or less           Kidney failure          (1)In the absence of evidence of kidney disease, neither GFR category G1 or G2 fulfill the criteria for CKD.    eGFR calculation 2021 CKD-EPI creatinine equation, which does not include race as a factor    Lipase [[id number]]  (Normal) Collected: 12/17/24 1905    Specimen: Blood from Arm, Right Updated: 12/17/24 1947     Lipase 52 U/L     BNP [[id number]]  (Normal) Collected: 12/17/24 1905    Specimen: Blood from Arm, Right Updated: 12/17/24 1945     proBNP 68.1 pg/mL     Narrative:      This assay is used as an aid in the diagnosis of individuals suspected of having heart failure. It can be used as an aid in the diagnosis of acute decompensated heart failure (ADHF) in patients presenting with signs and symptoms of ADHF to the emergency department (ED). In addition, NT-proBNP of <300 pg/mL indicates ADHF is not likely.    Age Range Result Interpretation  NT-proBNP Concentration (pg/mL:      <50             Positive             >450                   Gray                 300-450                    Negative             <300    50-75           Positive            >900                  Gray                300-900                  Negative            <300      >75             Positive            >1800                  Gray                300-1800                  Negative            <300    Magnesium [[id number]]  (Normal) Collected: 12/17/24 1905    Specimen: Blood from Arm, Right Updated: 12/17/24 1947     Magnesium 2.2 mg/dL     CBC Auto Differential [[id number]]  (Abnormal) Collected: 12/17/24 1905    Specimen: Blood from Arm, Right Updated: 12/17/24 1929     WBC 9.05 10*3/mm3      RBC 4.78 10*6/mm3      Hemoglobin 13.0 g/dL      Hematocrit 41.5 %      MCV 86.8 fL      MCH 27.2 pg      MCHC 31.3 g/dL      RDW 13.6 %      RDW-SD 43.3 fl      MPV 10.0 fL      Platelets 366 10*3/mm3      Neutrophil % 56.5 %      Lymphocyte % 34.1 %      Monocyte % 5.5 %      Eosinophil % 2.7 %      Basophil % 0.9 %      Immature Grans % 0.3 %      Neutrophils, Absolute 5.11 10*3/mm3      Lymphocytes, Absolute 3.09 10*3/mm3      Monocytes, Absolute 0.50 10*3/mm3      Eosinophils, Absolute 0.24 10*3/mm3      Basophils, Absolute 0.08 10*3/mm3      Immature Grans, Absolute 0.03 10*3/mm3      nRBC 0.0 /100 WBC     High Sensitivity Troponin T 1Hr [[id number]]  (Normal) Collected: 12/17/24 2014    Specimen: Blood Updated: 12/17/24 2051     HS Troponin T 11 ng/L      Troponin T Delta -1 ng/L     Narrative:      High Sensitive Troponin T Reference Range:  <14.0 ng/L- Negative Female for AMI  <22.0 ng/L- Negative Male for AMI  >=14 - Abnormal Female indicating possible myocardial injury.  >=22 - Abnormal Male indicating possible myocardial injury.   Clinicians would have to utilize clinical acumen, EKG, Troponin, and serial changes to determine if it is an Acute Myocardial Infarction or myocardial injury due to an underlying chronic condition.                   Imaging:    XR Chest 1 View    Result Date: 12/17/2024  XR CHEST 1 VW Date of Exam: 12/17/2024 7:39 PM EST Indication: Chest Pain Triage Protocol Comparison: Chest radiograph 12/4/2022 Findings: Mediastinum: Cardiac silhouette appears unchanged and normal in size Lungs: The lungs appear clear without focal consolidation appreciated. Pleura: No pleural effusion or pneumothorax. Bones and soft tissues: No acute, displaced fracture seen.     Impression: No radiographic evidence of acute cardiopulmonary abnormality. Electronically Signed: Karl Rodriguez  12/17/2024 8:03 PM EST  Workstation ID: [id number]       Differential Diagnosis and Discussion:      Chest Pain:  Based on the patient's signs and symptoms, I considered aortic dissection, myocardial infaction, pulmonary embolism, cardiac tamponade, pericarditis, pneumothorax, musculoskeletal chest pain and other differential diagnosis as an etiology of the patient's chest pain.     PROCEDURES:    Labs were drawn in the emergency department and all labs were reviewed and interpreted by me.  X-ray were performed in the emergency department and all X-ray impressions were independently interpreted by me.  An EKG was performed and the EKG was interpreted by me.    ECG 12 Lead ED Triage Standing Order; Chest Pain   Preliminary Result   HEART RATE=78  bpm   RR Itqzuqcf=427  ms   LA Xcsbduna=196  ms   P Horizontal Axis=-2  deg   P Front Axis=57  deg   QRSD Interval=86  ms   QT Ckenhefv=150  ms   SFzR=402  ms   QRS Axis=40  deg   T Wave Axis=58  deg   - NORMAL ECG -   Sinus rhythm   Date and Time of Study:2024-12-17 18:57:30           Procedures    MDM     The patient had an EKG that shows no acute changes. Specifically, there are no ST elevations, t-wave changes of concern, delta waves, or rhythm abnormalities warranting admission. The patient was placed on the cardiac monitor and observed with continuous telemetry. The patient has a chest x-ray interpreted by me that is  negative for pneumothorax, pneumonia, and is essentially unremarkable. The patient has had unelevated troponins on blood draw.      The patient is resting comfortably and feels better, is alert and in no distress.  The patient´s CBC that was reviewed and interpreted by me shows no abnormalities of critical concern. Of note, there is no anemia requiring a blood transfusion and the platelet count is acceptable.  The patient´s CMP that was reviewed and interpretted by me shows no abnormalities of critical concern. Of note, the patient´s sodium and potassium are acceptable. The patient´s liver enzymes are unremarkable. The patient´s renal function (creatinine) is preserved. The patient has a normal anion gap.  Troponin x 2 is negative.  The repeat examination is unremarkable and benign. Electrocardiogram shows no signs of acute ischemia and the history, exam, diagnostic testing and current condition did not suggest that this patient is having an acute myocardial infarction, significant arrhythmia, unstable angina, esophageal perforation, pulmonary embolism, aortic dissection, severe pneumonia, sepsis for other significant pathology that would warrant further testing, continued ED treatment, admission, cardiology or other specialist consultation at this point. The vital signs have been stable. The patient's condition is stable and appropriate for discharge. The patient will pursue further outpatient evaluation with the primary care physician, or designated physician or cardiologist. The patient has expressed a clear and thorough understanding and agreed to follow-up as instructed.              Patient Care Considerations:    PERC: I used the PERC score to risk stratify the patient for PE and a CT of the chest was considered but ultimately not indicated in today's visit.      Consultants/Shared Management Plan:    None    Social Determinants of Health:    Patient is independent, reliable, and has access to care.        Disposition and Care Coordination:    Discharged: I considered escalation of care by admitting this patient to the hospital, however patient reports improvement with ED treatment    I have explained the patient´s condition, diagnoses and treatment plan based on the information available to me at this time. I have answered questions and addressed any concerns. The patient has a good  understanding of the patient´s diagnosis, condition, and treatment plan as can be expected at this point. The vital signs have been stable. The patient´s condition is stable and appropriate for discharge from the emergency department.      The patient will pursue further outpatient evaluation with the primary care physician or other designated or consulting physician as outlined in the discharge instructions. They are agreeable to this plan of care and follow-up instructions have been explained in detail. The patient has received these instructions in written format and has expressed an understanding of the discharge instructions. The patient is aware that any significant change in condition or worsening of symptoms should prompt an immediate return to this or the closest emergency department or call to 911.  I have explained discharge medications and the need for follow up with the patient/caretakers. This was also printed in the discharge instructions. Patient was discharged with the following medications and follow up:      Medication List        New Prescriptions      amLODIPine 5 MG tablet  Commonly known as: NORVASC  Take 1 tablet by mouth Daily.               Where to Get Your Medications        These medications were sent to [facility] [place]  [address] [place] [address]      Hours: Monday to Friday 9 AM to 7:30 PM, Saturday 9 AM to 2 PM Phone: [phone]   amLODIPine 5 MG tablet      Mayela Bruce APRN  [address]  [address]  [place] [address]  [phone]    In 2 days          Final diagnoses:   Chest pain, unspecified type   Secondary hypertension        ED Disposition       ED Disposition   Discharge    Condition   Stable    Comment   --               This medical record created using voice recognition software.             Ai Shipley MD  12/17/24 0180

## 2025-01-21 DIAGNOSIS — Z79.4 TYPE 2 DIABETES MELLITUS WITH HYPERGLYCEMIA, WITH LONG-TERM CURRENT USE OF INSULIN: Primary | ICD-10-CM

## 2025-01-21 DIAGNOSIS — K21.9 GASTROESOPHAGEAL REFLUX DISEASE WITHOUT ESOPHAGITIS: ICD-10-CM

## 2025-01-21 DIAGNOSIS — E11.65 TYPE 2 DIABETES MELLITUS WITH HYPERGLYCEMIA, WITH LONG-TERM CURRENT USE OF INSULIN: Primary | ICD-10-CM

## 2025-01-21 RX ORDER — BLOOD-GLUCOSE METER
1 KIT MISCELLANEOUS
Qty: 1 EACH | Refills: 0 | Status: SHIPPED | OUTPATIENT
Start: 2025-01-21 | End: 2025-01-21

## 2025-01-21 RX ORDER — INSULIN ASPART 100 [IU]/ML
4 INJECTION, SOLUTION INTRAVENOUS; SUBCUTANEOUS
Qty: 15 ML | Refills: 5 | Status: SHIPPED | OUTPATIENT
Start: 2025-01-21

## 2025-01-21 RX ORDER — FAMOTIDINE 40 MG/1
40 TABLET, FILM COATED ORAL 2 TIMES DAILY
Qty: 60 TABLET | Refills: 3 | Status: SHIPPED | OUTPATIENT
Start: 2025-01-21

## 2025-01-21 RX ORDER — FENOFIBRATE 145 MG/1
145 TABLET, COATED ORAL DAILY
Qty: 90 TABLET | Refills: 1 | Status: SHIPPED | OUTPATIENT
Start: 2025-01-21

## 2025-01-21 RX ORDER — ATORVASTATIN CALCIUM 40 MG/1
40 TABLET, FILM COATED ORAL
Qty: 90 TABLET | Refills: 1 | Status: SHIPPED | OUTPATIENT
Start: 2025-01-21

## 2025-01-21 RX ORDER — LOSARTAN POTASSIUM 100 MG/1
100 TABLET ORAL DAILY
Qty: 90 TABLET | Refills: 1 | Status: SHIPPED | OUTPATIENT
Start: 2025-01-21

## 2025-01-21 RX ORDER — MULTIVITAMIN WITH IRON
1 TABLET ORAL DAILY
Qty: 30 TABLET | Refills: 11 | Status: SHIPPED | OUTPATIENT
Start: 2025-01-21 | End: 2026-01-21

## 2025-01-21 RX ORDER — BLOOD-GLUCOSE METER
1 KIT MISCELLANEOUS
Qty: 1 EACH | Refills: 0 | Status: SHIPPED | OUTPATIENT
Start: 2025-01-21

## 2025-01-21 RX ORDER — INSULIN ASPART 100 [IU]/ML
INJECTION, SOLUTION INTRAVENOUS; SUBCUTANEOUS
Qty: 15 ML | Refills: 3 | Status: SHIPPED | OUTPATIENT
Start: 2025-01-21

## 2025-01-21 RX ORDER — BUPROPION HYDROCHLORIDE 300 MG/1
300 TABLET ORAL EVERY MORNING
Qty: 90 TABLET | Refills: 1 | Status: SHIPPED | OUTPATIENT
Start: 2025-01-21

## 2025-01-21 RX ORDER — EMPAGLIFLOZIN 25 MG/1
25 TABLET, FILM COATED ORAL DAILY
Qty: 30 TABLET | Refills: 5 | Status: SHIPPED | OUTPATIENT
Start: 2025-01-21 | End: 2026-01-21

## 2025-01-21 RX ORDER — INSULIN DEGLUDEC 100 U/ML
30 INJECTION, SOLUTION SUBCUTANEOUS NIGHTLY
Qty: 15 ML | Refills: 5 | Status: SHIPPED | OUTPATIENT
Start: 2025-01-21

## 2025-01-21 RX ORDER — TIZANIDINE HYDROCHLORIDE 2 MG/1
2 CAPSULE, GELATIN COATED ORAL 3 TIMES DAILY PRN
Qty: 30 CAPSULE | Refills: 1 | Status: SHIPPED | OUTPATIENT
Start: 2025-01-21

## 2025-01-21 RX ORDER — FOLIC ACID 1 MG/1
1 TABLET ORAL DAILY
Qty: 90 TABLET | Refills: 0 | Status: SHIPPED | OUTPATIENT
Start: 2025-01-21

## 2025-01-21 RX ORDER — PEN NEEDLE, DIABETIC 32GX 5/32"
NEEDLE, DISPOSABLE MISCELLANEOUS
Qty: 200 EACH | Refills: 5 | Status: SHIPPED | OUTPATIENT
Start: 2025-01-21

## 2025-01-21 RX ORDER — PANTOPRAZOLE SODIUM 40 MG/1
40 TABLET, DELAYED RELEASE ORAL
Qty: 180 TABLET | Refills: 1 | Status: SHIPPED | OUTPATIENT
Start: 2025-01-21

## 2025-01-21 RX ORDER — HYDROXYZINE HYDROCHLORIDE 25 MG/1
25 TABLET, FILM COATED ORAL EVERY 6 HOURS PRN
Qty: 360 TABLET | Refills: 1 | Status: SHIPPED | OUTPATIENT
Start: 2025-01-21

## 2025-01-21 RX ORDER — INSULIN DEGLUDEC 100 U/ML
32 INJECTION, SOLUTION SUBCUTANEOUS
Qty: 30 ML | Refills: 5 | Status: SHIPPED | OUTPATIENT
Start: 2025-01-21

## 2025-01-22 ENCOUNTER — TELEPHONE (OUTPATIENT)
Dept: GASTROENTEROLOGY | Facility: CLINIC | Age: 45
End: 2025-01-22

## 2025-01-22 NOTE — TELEPHONE ENCOUNTER
Attempted to contact [facility]yah Butts 1980 regarding the appointment no show with SHAE Yin on 01/22/2025. Patient is aware that there is a 24-hour cancellation policy and understands that a no-show letter will be mailed to them at the address on file.Left detailed message for the patient about this missed appointment.

## 2025-02-02 ENCOUNTER — APPOINTMENT (OUTPATIENT)
Dept: CT IMAGING | Facility: HOSPITAL | Age: 45
DRG: 639 | End: 2025-02-02
Payer: MEDICARE

## 2025-02-02 ENCOUNTER — APPOINTMENT (OUTPATIENT)
Dept: GENERAL RADIOLOGY | Facility: HOSPITAL | Age: 45
DRG: 639 | End: 2025-02-02
Payer: MEDICARE

## 2025-02-02 ENCOUNTER — HOSPITAL ENCOUNTER (INPATIENT)
Facility: HOSPITAL | Age: 45
LOS: 2 days | Discharge: HOME | DRG: 639 | End: 2025-02-04
Attending: EMERGENCY MEDICINE | Admitting: HOSPITALIST
Payer: MEDICARE

## 2025-02-02 DIAGNOSIS — K56.7 ILEUS: ICD-10-CM

## 2025-02-02 DIAGNOSIS — R10.84 ACUTE GENERALIZED ABDOMINAL PAIN: ICD-10-CM

## 2025-02-02 DIAGNOSIS — E11.10 DIABETIC KETOACIDOSIS WITHOUT COMA ASSOCIATED WITH TYPE 2 DIABETES MELLITUS: Primary | ICD-10-CM

## 2025-02-02 DIAGNOSIS — R11.2 NAUSEA AND VOMITING, UNSPECIFIED VOMITING TYPE: ICD-10-CM

## 2025-02-02 LAB
ACETONE BLD QL: NEGATIVE
ALBUMIN SERPL-MCNC: 4 G/DL (ref 3.5–5.2)
ALBUMIN SERPL-MCNC: 4.2 G/DL (ref 3.5–5.2)
ALBUMIN/GLOB SERPL: 1.1 G/DL
ALBUMIN/GLOB SERPL: 1.1 G/DL
ALP SERPL-CCNC: 55 U/L (ref 39–117)
ALP SERPL-CCNC: 64 U/L (ref 39–117)
ALT SERPL W P-5'-P-CCNC: 24 U/L (ref 1–33)
ALT SERPL W P-5'-P-CCNC: 25 U/L (ref 1–33)
ANION GAP SERPL CALCULATED.3IONS-SCNC: 16.1 MMOL/L (ref 5–15)
ANION GAP SERPL CALCULATED.3IONS-SCNC: 19.9 MMOL/L (ref 5–15)
AST SERPL-CCNC: 31 U/L (ref 1–32)
AST SERPL-CCNC: 33 U/L (ref 1–32)
ATMOSPHERIC PRESS: 744.3 MMHG
BACTERIA UR QL AUTO: NORMAL /HPF
BASE EXCESS BLDV CALC-SCNC: -1.5 MMOL/L (ref -2–2)
BASOPHILS # BLD AUTO: 0.05 10*3/MM3 (ref 0–0.2)
BASOPHILS # BLD AUTO: 0.07 10*3/MM3 (ref 0–0.2)
BASOPHILS NFR BLD AUTO: 0.4 % (ref 0–1.5)
BASOPHILS NFR BLD AUTO: 0.4 % (ref 0–1.5)
BILIRUB SERPL-MCNC: 0.4 MG/DL (ref 0–1.2)
BILIRUB SERPL-MCNC: 0.6 MG/DL (ref 0–1.2)
BILIRUB UR QL STRIP: NEGATIVE
BUN SERPL-MCNC: 29 MG/DL (ref 6–20)
BUN SERPL-MCNC: 34 MG/DL (ref 6–20)
BUN/CREAT SERPL: 20.4 (ref 7–25)
BUN/CREAT SERPL: 22.7 (ref 7–25)
CA-I BLDA-SCNC: 1.13 MMOL/L (ref 1.13–1.32)
CALCIUM SPEC-SCNC: 10 MG/DL (ref 8.6–10.5)
CALCIUM SPEC-SCNC: 8.9 MG/DL (ref 8.6–10.5)
CHLORIDE BLDA-SCNC: 109 MMOL/L (ref 98–107)
CHLORIDE SERPL-SCNC: 103 MMOL/L (ref 98–107)
CHLORIDE SERPL-SCNC: 105 MMOL/L (ref 98–107)
CLARITY UR: CLEAR
CO2 SERPL-SCNC: 16.1 MMOL/L (ref 22–29)
CO2 SERPL-SCNC: 18.9 MMOL/L (ref 22–29)
COLOR UR: YELLOW
CREAT SERPL-MCNC: 1.42 MG/DL (ref 0.57–1)
CREAT SERPL-MCNC: 1.5 MG/DL (ref 0.57–1)
D-LACTATE SERPL-SCNC: 1.4 MMOL/L (ref 0.5–2)
D-LACTATE SERPL-SCNC: 2 MMOL/L (ref 0.5–2)
D-LACTATE SERPL-SCNC: 2.1 MMOL/L
DEPRECATED RDW RBC AUTO: 41.9 FL (ref 37–54)
DEPRECATED RDW RBC AUTO: 42.6 FL (ref 37–54)
EGFRCR SERPLBLD CKD-EPI 2021: 43.9 ML/MIN/1.73
EGFRCR SERPLBLD CKD-EPI 2021: 46.9 ML/MIN/1.73
EOSINOPHIL # BLD AUTO: 0.02 10*3/MM3 (ref 0–0.4)
EOSINOPHIL # BLD AUTO: 0.16 10*3/MM3 (ref 0–0.4)
EOSINOPHIL NFR BLD AUTO: 0.2 % (ref 0.3–6.2)
EOSINOPHIL NFR BLD AUTO: 0.9 % (ref 0.3–6.2)
ERYTHROCYTE [DISTWIDTH] IN BLOOD BY AUTOMATED COUNT: 13.8 % (ref 12.3–15.4)
ERYTHROCYTE [DISTWIDTH] IN BLOOD BY AUTOMATED COUNT: 13.8 % (ref 12.3–15.4)
GLOBULIN UR ELPH-MCNC: 3.6 GM/DL
GLOBULIN UR ELPH-MCNC: 4 GM/DL
GLUCOSE BLDC GLUCOMTR-MCNC: 160 MG/DL (ref 70–99)
GLUCOSE BLDC GLUCOMTR-MCNC: 162 MG/DL (ref 70–99)
GLUCOSE BLDC GLUCOMTR-MCNC: 172 MG/DL (ref 70–99)
GLUCOSE BLDC GLUCOMTR-MCNC: 174 MG/DL (ref 70–99)
GLUCOSE BLDC GLUCOMTR-MCNC: 188 MG/DL (ref 70–99)
GLUCOSE BLDC GLUCOMTR-MCNC: 229 MG/DL (ref 65–99)
GLUCOSE SERPL-MCNC: 196 MG/DL (ref 65–99)
GLUCOSE SERPL-MCNC: 250 MG/DL (ref 65–99)
GLUCOSE UR STRIP-MCNC: ABNORMAL MG/DL
HCO3 BLDV-SCNC: 19.3 MMOL/L (ref 22–26)
HCT VFR BLD AUTO: 43.1 % (ref 34–46.6)
HCT VFR BLD AUTO: 44.6 % (ref 34–46.6)
HGB BLD-MCNC: 13.7 G/DL (ref 12–15.9)
HGB BLD-MCNC: 14.5 G/DL (ref 12–15.9)
HGB BLDA-MCNC: 16.1 G/DL (ref 12–18)
HGB UR QL STRIP.AUTO: ABNORMAL
HOLD SPECIMEN: NORMAL
HOLD SPECIMEN: NORMAL
HYALINE CASTS UR QL AUTO: NORMAL /LPF
IMM GRANULOCYTES # BLD AUTO: 0.06 10*3/MM3 (ref 0–0.05)
IMM GRANULOCYTES # BLD AUTO: 0.08 10*3/MM3 (ref 0–0.05)
IMM GRANULOCYTES NFR BLD AUTO: 0.5 % (ref 0–0.5)
IMM GRANULOCYTES NFR BLD AUTO: 0.5 % (ref 0–0.5)
KETONES UR QL STRIP: ABNORMAL
LEUKOCYTE ESTERASE UR QL STRIP.AUTO: NEGATIVE
LIPASE SERPL-CCNC: 33 U/L (ref 13–60)
LYMPHOCYTES # BLD AUTO: 0.43 10*3/MM3 (ref 0.7–3.1)
LYMPHOCYTES # BLD AUTO: 0.45 10*3/MM3 (ref 0.7–3.1)
LYMPHOCYTES NFR BLD AUTO: 2.6 % (ref 19.6–45.3)
LYMPHOCYTES NFR BLD AUTO: 3.2 % (ref 19.6–45.3)
MAGNESIUM SERPL-MCNC: 1.8 MG/DL (ref 1.6–2.6)
MCH RBC QN AUTO: 27.1 PG (ref 26.6–33)
MCH RBC QN AUTO: 27.2 PG (ref 26.6–33)
MCHC RBC AUTO-ENTMCNC: 31.8 G/DL (ref 31.5–35.7)
MCHC RBC AUTO-ENTMCNC: 32.5 G/DL (ref 31.5–35.7)
MCV RBC AUTO: 83.5 FL (ref 79–97)
MCV RBC AUTO: 85.3 FL (ref 79–97)
MODALITY: ABNORMAL
MONOCYTES # BLD AUTO: 0.32 10*3/MM3 (ref 0.1–0.9)
MONOCYTES # BLD AUTO: 0.59 10*3/MM3 (ref 0.1–0.9)
MONOCYTES NFR BLD AUTO: 2.4 % (ref 5–12)
MONOCYTES NFR BLD AUTO: 3.4 % (ref 5–12)
NEUTROPHILS NFR BLD AUTO: 12.41 10*3/MM3 (ref 1.7–7)
NEUTROPHILS NFR BLD AUTO: 16.05 10*3/MM3 (ref 1.7–7)
NEUTROPHILS NFR BLD AUTO: 92.2 % (ref 42.7–76)
NEUTROPHILS NFR BLD AUTO: 93.3 % (ref 42.7–76)
NITRITE UR QL STRIP: NEGATIVE
NOTIFIED WHO: ABNORMAL
NRBC BLD AUTO-RTO: 0 /100 WBC (ref 0–0.2)
NRBC BLD AUTO-RTO: 0 /100 WBC (ref 0–0.2)
OSMOLALITY SERPL: 303 MOSM/KG (ref 275–295)
PCO2 BLDV: 24 MM HG (ref 41–51)
PH BLDV: 7.51 PH UNITS (ref 7.31–7.41)
PH UR STRIP.AUTO: 6 [PH] (ref 5–8)
PHOSPHATE SERPL-MCNC: 2.9 MG/DL (ref 2.5–4.5)
PLATELET # BLD AUTO: 343 10*3/MM3 (ref 140–450)
PLATELET # BLD AUTO: 406 10*3/MM3 (ref 140–450)
PMV BLD AUTO: 9.6 FL (ref 6–12)
PMV BLD AUTO: 9.6 FL (ref 6–12)
PO2 BLDV: 39.4 MM HG (ref 35–42)
POTASSIUM BLDA-SCNC: 4.1 MMOL/L (ref 3.5–5)
POTASSIUM SERPL-SCNC: 4.1 MMOL/L (ref 3.5–5.2)
POTASSIUM SERPL-SCNC: 4.5 MMOL/L (ref 3.5–5.2)
PROT SERPL-MCNC: 7.6 G/DL (ref 6–8.5)
PROT SERPL-MCNC: 8.2 G/DL (ref 6–8.5)
PROT UR QL STRIP: ABNORMAL
RBC # BLD AUTO: 5.05 10*6/MM3 (ref 3.77–5.28)
RBC # BLD AUTO: 5.34 10*6/MM3 (ref 3.77–5.28)
RBC # UR STRIP: NORMAL /HPF
REF LAB TEST METHOD: NORMAL
SAO2 % BLDCOV: 81.1 % (ref 45–75)
SODIUM BLD-SCNC: 143 MMOL/L (ref 131–143)
SODIUM SERPL-SCNC: 139 MMOL/L (ref 136–145)
SODIUM SERPL-SCNC: 140 MMOL/L (ref 136–145)
SP GR UR STRIP: 1.02 (ref 1–1.03)
SQUAMOUS #/AREA URNS HPF: NORMAL /HPF
TROPONIN T SERPL HS-MCNC: 15 NG/L
UROBILINOGEN UR QL STRIP: ABNORMAL
WBC # UR STRIP: NORMAL /HPF
WBC NRBC COR # BLD AUTO: 13.29 10*3/MM3 (ref 3.4–10.8)
WBC NRBC COR # BLD AUTO: 17.4 10*3/MM3 (ref 3.4–10.8)
WHOLE BLOOD HOLD COAG: NORMAL
WHOLE BLOOD HOLD SPECIMEN: NORMAL

## 2025-02-02 PROCEDURE — 82330 ASSAY OF CALCIUM: CPT

## 2025-02-02 PROCEDURE — 99291 CRITICAL CARE FIRST HOUR: CPT

## 2025-02-02 PROCEDURE — 82009 KETONE BODYS QUAL: CPT | Performed by: EMERGENCY MEDICINE

## 2025-02-02 PROCEDURE — 25010000002 HYDROMORPHONE 1 MG/ML SOLUTION: Performed by: EMERGENCY MEDICINE

## 2025-02-02 PROCEDURE — 82948 REAGENT STRIP/BLOOD GLUCOSE: CPT

## 2025-02-02 PROCEDURE — 80051 ELECTROLYTE PANEL: CPT

## 2025-02-02 PROCEDURE — 81001 URINALYSIS AUTO W/SCOPE: CPT | Performed by: EMERGENCY MEDICINE

## 2025-02-02 PROCEDURE — 25810000003 SODIUM CHLORIDE 0.9 % SOLUTION: Performed by: EMERGENCY MEDICINE

## 2025-02-02 PROCEDURE — 84100 ASSAY OF PHOSPHORUS: CPT | Performed by: EMERGENCY MEDICINE

## 2025-02-02 PROCEDURE — 74176 CT ABD & PELVIS W/O CONTRAST: CPT

## 2025-02-02 PROCEDURE — 82948 REAGENT STRIP/BLOOD GLUCOSE: CPT | Performed by: EMERGENCY MEDICINE

## 2025-02-02 PROCEDURE — 99222 1ST HOSP IP/OBS MODERATE 55: CPT | Performed by: HOSPITALIST

## 2025-02-02 PROCEDURE — 84484 ASSAY OF TROPONIN QUANT: CPT | Performed by: EMERGENCY MEDICINE

## 2025-02-02 PROCEDURE — 93005 ELECTROCARDIOGRAM TRACING: CPT | Performed by: EMERGENCY MEDICINE

## 2025-02-02 PROCEDURE — 85025 COMPLETE CBC W/AUTO DIFF WBC: CPT | Performed by: EMERGENCY MEDICINE

## 2025-02-02 PROCEDURE — 83605 ASSAY OF LACTIC ACID: CPT

## 2025-02-02 PROCEDURE — 83690 ASSAY OF LIPASE: CPT | Performed by: EMERGENCY MEDICINE

## 2025-02-02 PROCEDURE — 80053 COMPREHEN METABOLIC PANEL: CPT | Performed by: EMERGENCY MEDICINE

## 2025-02-02 PROCEDURE — 82803 BLOOD GASES ANY COMBINATION: CPT

## 2025-02-02 PROCEDURE — 83735 ASSAY OF MAGNESIUM: CPT | Performed by: EMERGENCY MEDICINE

## 2025-02-02 PROCEDURE — 83036 HEMOGLOBIN GLYCOSYLATED A1C: CPT | Performed by: EMERGENCY MEDICINE

## 2025-02-02 PROCEDURE — 25010000002 METOCLOPRAMIDE PER 10 MG: Performed by: EMERGENCY MEDICINE

## 2025-02-02 PROCEDURE — 36415 COLL VENOUS BLD VENIPUNCTURE: CPT | Performed by: EMERGENCY MEDICINE

## 2025-02-02 PROCEDURE — 71045 X-RAY EXAM CHEST 1 VIEW: CPT

## 2025-02-02 PROCEDURE — 83930 ASSAY OF BLOOD OSMOLALITY: CPT | Performed by: EMERGENCY MEDICINE

## 2025-02-02 RX ORDER — SODIUM CHLORIDE 9 MG/ML
200 INJECTION, SOLUTION INTRAVENOUS CONTINUOUS PRN
Status: DISCONTINUED | OUTPATIENT
Start: 2025-02-02 | End: 2025-02-03

## 2025-02-02 RX ORDER — ACETAMINOPHEN 650 MG/1
650 SUPPOSITORY RECTAL EVERY 4 HOURS PRN
Status: DISCONTINUED | OUTPATIENT
Start: 2025-02-02 | End: 2025-02-04 | Stop reason: HOSPADM

## 2025-02-02 RX ORDER — DEXTROSE MONOHYDRATE, SODIUM CHLORIDE, AND POTASSIUM CHLORIDE 50; 1.49; 4.5 G/1000ML; G/1000ML; G/1000ML
125 INJECTION, SOLUTION INTRAVENOUS CONTINUOUS PRN
Status: DISCONTINUED | OUTPATIENT
Start: 2025-02-02 | End: 2025-02-03

## 2025-02-02 RX ORDER — SODIUM CHLORIDE 0.9 % (FLUSH) 0.9 %
10 SYRINGE (ML) INJECTION AS NEEDED
Status: DISCONTINUED | OUTPATIENT
Start: 2025-02-02 | End: 2025-02-04 | Stop reason: HOSPADM

## 2025-02-02 RX ORDER — SODIUM CHLORIDE 0.9 % (FLUSH) 0.9 %
10 SYRINGE (ML) INJECTION EVERY 12 HOURS SCHEDULED
Status: DISCONTINUED | OUTPATIENT
Start: 2025-02-02 | End: 2025-02-04 | Stop reason: HOSPADM

## 2025-02-02 RX ORDER — DEXTROSE MONOHYDRATE, SODIUM CHLORIDE, AND POTASSIUM CHLORIDE 50; 2.98; 4.5 G/1000ML; G/1000ML; G/1000ML
125 INJECTION, SOLUTION INTRAVENOUS CONTINUOUS PRN
Status: DISCONTINUED | OUTPATIENT
Start: 2025-02-02 | End: 2025-02-03

## 2025-02-02 RX ORDER — ONDANSETRON 2 MG/ML
4 INJECTION INTRAMUSCULAR; INTRAVENOUS EVERY 6 HOURS PRN
Status: DISCONTINUED | OUTPATIENT
Start: 2025-02-02 | End: 2025-02-03

## 2025-02-02 RX ORDER — NICOTINE POLACRILEX 4 MG
15 LOZENGE BUCCAL
Status: DISCONTINUED | OUTPATIENT
Start: 2025-02-02 | End: 2025-02-03 | Stop reason: SDUPTHER

## 2025-02-02 RX ORDER — DEXTROSE MONOHYDRATE AND SODIUM CHLORIDE 5; .9 G/100ML; G/100ML
125 INJECTION, SOLUTION INTRAVENOUS CONTINUOUS PRN
Status: DISCONTINUED | OUTPATIENT
Start: 2025-02-02 | End: 2025-02-03

## 2025-02-02 RX ORDER — ONDANSETRON 4 MG/1
4 TABLET, ORALLY DISINTEGRATING ORAL EVERY 6 HOURS PRN
Status: DISCONTINUED | OUTPATIENT
Start: 2025-02-02 | End: 2025-02-03

## 2025-02-02 RX ORDER — METOCLOPRAMIDE HYDROCHLORIDE 5 MG/ML
10 INJECTION INTRAMUSCULAR; INTRAVENOUS ONCE
Status: COMPLETED | OUTPATIENT
Start: 2025-02-02 | End: 2025-02-02

## 2025-02-02 RX ORDER — ONDANSETRON 2 MG/ML
4 INJECTION INTRAMUSCULAR; INTRAVENOUS EVERY 6 HOURS PRN
Status: DISCONTINUED | OUTPATIENT
Start: 2025-02-02 | End: 2025-02-02

## 2025-02-02 RX ORDER — SODIUM CHLORIDE 0.9 % (FLUSH) 0.9 %
10 SYRINGE (ML) INJECTION AS NEEDED
Status: DISCONTINUED | OUTPATIENT
Start: 2025-02-02 | End: 2025-02-03

## 2025-02-02 RX ORDER — DEXTROSE MONOHYDRATE AND SODIUM CHLORIDE 5; .45 G/100ML; G/100ML
125 INJECTION, SOLUTION INTRAVENOUS CONTINUOUS PRN
Status: DISCONTINUED | OUTPATIENT
Start: 2025-02-02 | End: 2025-02-03

## 2025-02-02 RX ORDER — SODIUM CHLORIDE AND POTASSIUM CHLORIDE 150; 450 MG/100ML; MG/100ML
200 INJECTION, SOLUTION INTRAVENOUS CONTINUOUS PRN
Status: DISCONTINUED | OUTPATIENT
Start: 2025-02-02 | End: 2025-02-03

## 2025-02-02 RX ORDER — SODIUM CHLORIDE AND POTASSIUM CHLORIDE 150; 900 MG/100ML; MG/100ML
200 INJECTION, SOLUTION INTRAVENOUS CONTINUOUS PRN
Status: DISCONTINUED | OUTPATIENT
Start: 2025-02-02 | End: 2025-02-03

## 2025-02-02 RX ORDER — SODIUM CHLORIDE 9 MG/ML
40 INJECTION, SOLUTION INTRAVENOUS AS NEEDED
Status: DISCONTINUED | OUTPATIENT
Start: 2025-02-02 | End: 2025-02-04 | Stop reason: HOSPADM

## 2025-02-02 RX ORDER — FAMOTIDINE 20 MG/1
40 TABLET, FILM COATED ORAL 2 TIMES DAILY
Status: DISCONTINUED | OUTPATIENT
Start: 2025-02-02 | End: 2025-02-04 | Stop reason: HOSPADM

## 2025-02-02 RX ORDER — DEXTROSE MONOHYDRATE, SODIUM CHLORIDE, AND POTASSIUM CHLORIDE 50; 1.49; 9 G/1000ML; G/1000ML; G/1000ML
125 INJECTION, SOLUTION INTRAVENOUS CONTINUOUS PRN
Status: DISCONTINUED | OUTPATIENT
Start: 2025-02-02 | End: 2025-02-03

## 2025-02-02 RX ORDER — ACETAMINOPHEN 325 MG/1
650 TABLET ORAL EVERY 4 HOURS PRN
Status: DISCONTINUED | OUTPATIENT
Start: 2025-02-02 | End: 2025-02-04 | Stop reason: HOSPADM

## 2025-02-02 RX ORDER — IPRATROPIUM BROMIDE AND ALBUTEROL SULFATE 2.5; .5 MG/3ML; MG/3ML
3 SOLUTION RESPIRATORY (INHALATION) EVERY 6 HOURS PRN
Status: DISCONTINUED | OUTPATIENT
Start: 2025-02-02 | End: 2025-02-04 | Stop reason: HOSPADM

## 2025-02-02 RX ORDER — DEXTROSE MONOHYDRATE, SODIUM CHLORIDE, AND POTASSIUM CHLORIDE 50; 2.98; 9 G/1000ML; G/1000ML; G/1000ML
125 INJECTION, SOLUTION INTRAVENOUS CONTINUOUS PRN
Status: DISCONTINUED | OUTPATIENT
Start: 2025-02-02 | End: 2025-02-03

## 2025-02-02 RX ORDER — HALOPERIDOL 5 MG/ML
2 INJECTION INTRAMUSCULAR EVERY 6 HOURS PRN
Status: DISCONTINUED | OUTPATIENT
Start: 2025-02-02 | End: 2025-02-03

## 2025-02-02 RX ORDER — AMOXICILLIN 250 MG
2 CAPSULE ORAL 2 TIMES DAILY
Status: DISCONTINUED | OUTPATIENT
Start: 2025-02-02 | End: 2025-02-04 | Stop reason: HOSPADM

## 2025-02-02 RX ORDER — IPRATROPIUM BROMIDE AND ALBUTEROL SULFATE 2.5; .5 MG/3ML; MG/3ML
3 SOLUTION RESPIRATORY (INHALATION)
Status: DISCONTINUED | OUTPATIENT
Start: 2025-02-03 | End: 2025-02-03

## 2025-02-02 RX ORDER — IBUPROFEN 600 MG/1
1 TABLET ORAL
Status: DISCONTINUED | OUTPATIENT
Start: 2025-02-02 | End: 2025-02-03 | Stop reason: SDUPTHER

## 2025-02-02 RX ORDER — SODIUM CHLORIDE 450 MG/100ML
200 INJECTION, SOLUTION INTRAVENOUS CONTINUOUS PRN
Status: DISCONTINUED | OUTPATIENT
Start: 2025-02-02 | End: 2025-02-03

## 2025-02-02 RX ORDER — SODIUM CHLORIDE 9 MG/ML
40 INJECTION, SOLUTION INTRAVENOUS AS NEEDED
Status: DISCONTINUED | OUTPATIENT
Start: 2025-02-02 | End: 2025-02-03

## 2025-02-02 RX ORDER — POLYETHYLENE GLYCOL 3350 17 G/17G
17 POWDER, FOR SOLUTION ORAL DAILY PRN
Status: DISCONTINUED | OUTPATIENT
Start: 2025-02-02 | End: 2025-02-04 | Stop reason: HOSPADM

## 2025-02-02 RX ORDER — BISACODYL 5 MG/1
5 TABLET, DELAYED RELEASE ORAL DAILY PRN
Status: DISCONTINUED | OUTPATIENT
Start: 2025-02-02 | End: 2025-02-04 | Stop reason: HOSPADM

## 2025-02-02 RX ORDER — NITROGLYCERIN 0.4 MG/1
0.4 TABLET SUBLINGUAL
Status: DISCONTINUED | OUTPATIENT
Start: 2025-02-02 | End: 2025-02-04 | Stop reason: HOSPADM

## 2025-02-02 RX ORDER — HYDRALAZINE HYDROCHLORIDE 20 MG/ML
10 INJECTION INTRAMUSCULAR; INTRAVENOUS EVERY 4 HOURS PRN
Status: DISCONTINUED | OUTPATIENT
Start: 2025-02-02 | End: 2025-02-04 | Stop reason: HOSPADM

## 2025-02-02 RX ORDER — SODIUM CHLORIDE AND POTASSIUM CHLORIDE 300; 900 MG/100ML; MG/100ML
200 INJECTION, SOLUTION INTRAVENOUS CONTINUOUS PRN
Status: DISCONTINUED | OUTPATIENT
Start: 2025-02-02 | End: 2025-02-03

## 2025-02-02 RX ORDER — BISACODYL 10 MG
10 SUPPOSITORY, RECTAL RECTAL DAILY PRN
Status: DISCONTINUED | OUTPATIENT
Start: 2025-02-02 | End: 2025-02-04 | Stop reason: HOSPADM

## 2025-02-02 RX ORDER — DEXTROSE MONOHYDRATE 25 G/50ML
10-50 INJECTION, SOLUTION INTRAVENOUS
Status: DISCONTINUED | OUTPATIENT
Start: 2025-02-02 | End: 2025-02-03 | Stop reason: SDUPTHER

## 2025-02-02 RX ADMIN — Medication 10 ML: at 22:54

## 2025-02-02 RX ADMIN — SODIUM CHLORIDE 1000 ML: 9 INJECTION, SOLUTION INTRAVENOUS at 17:54

## 2025-02-02 RX ADMIN — SODIUM CHLORIDE 1000 ML: 9 INJECTION, SOLUTION INTRAVENOUS at 18:38

## 2025-02-02 RX ADMIN — Medication 10 ML: at 22:18

## 2025-02-02 RX ADMIN — POTASSIUM CHLORIDE, DEXTROSE MONOHYDRATE AND SODIUM CHLORIDE 125 ML/HR: 150; 5; 450 INJECTION, SOLUTION INTRAVENOUS at 22:18

## 2025-02-02 RX ADMIN — INSULIN HUMAN 1.1 UNITS/HR: 1 INJECTION, SOLUTION INTRAVENOUS at 20:43

## 2025-02-02 RX ADMIN — FAMOTIDINE 40 MG: 20 TABLET ORAL at 22:54

## 2025-02-02 RX ADMIN — MUPIROCIN 1 APPLICATION: 20 OINTMENT TOPICAL at 22:54

## 2025-02-02 RX ADMIN — SODIUM CHLORIDE 1000 ML/HR: 9 INJECTION, SOLUTION INTRAVENOUS at 20:24

## 2025-02-02 RX ADMIN — HYDROMORPHONE HYDROCHLORIDE 1 MG: 1 INJECTION, SOLUTION INTRAMUSCULAR; INTRAVENOUS; SUBCUTANEOUS at 17:48

## 2025-02-02 RX ADMIN — METOCLOPRAMIDE 10 MG: 5 INJECTION, SOLUTION INTRAMUSCULAR; INTRAVENOUS at 17:52

## 2025-02-02 NOTE — ED PROVIDER NOTES
Time: 4:50 PM EST  Date of encounter:  2/2/2025  Independent Historian/Clinical History and Information was obtained by:   Patient    History is limited by: N/A    Chief Complaint: vomiting       History of Present Illness:  Patient is a 44 y.o. year old female with history of insulin-dependent diabetes, who presents to the emergency department for evaluation of vomiting that started this morning.  Patient also has complaints of periumbilical abdominal pain.  Has a history of type 2 diabetes and last took her insulin yesterday.  Has a history of DKA.    Denies any sick contacts.  Denies any missed doses of insulin.  No fevers.  No dysuria.    She has had severe nonstop nausea and vomiting and dry heaving essentially since this morning and now has multiple broken blood vessels in her face, which is typical for her from vomiting.    Patient Care Team  Primary Care Provider: Mayela Bruce APRN    Past Medical History:     Allergies   Allergen Reactions    Zofran [Ondansetron] Dizziness     Past Medical History:   Diagnosis Date    Anxiety 2021    Anxiety    Brain tumor (benign)     pituitary gland    Brain tumor (benign)     pituatary gland    Cancer     ovarian; history of    Depression 2021    Diabetes mellitus     GERD (gastroesophageal reflux disease) 2023    Gastritis    Headache 2003    Hyperlipidemia 2018    Hypertension 2018    Obesity 1980s    Renal insufficiency 2024    Visual impairment      Past Surgical History:   Procedure Laterality Date    ENDOSCOPY N/A 06/16/2023    Procedure: ESOPHAGOGASTRODUODENOSCOPY w/ biopsy;  Surgeon: Alyssa Damon MD;  Location: [facility] ENDOSCOPY;  Service: Gastroenterology;  Laterality: N/A;  gastritis    EYE SURGERY  Feb 2022    HYSTERECTOMY      UPPER GASTROINTESTINAL ENDOSCOPY       Family History   Problem Relation Age of Onset    Arthritis Mother     Diabetes Mother     Drug abuse Mother     Heart disease Mother     Hyperlipidemia Mother     Kidney disease  Paternal Uncle         Has 1 kidney and uses a cath       Home Medications:  Prior to Admission medications    Medication Sig Start Date End Date Taking? Authorizing Provider   Acetone, Urine, Test (Ketone Test) strip 1 strip if needed for high blood sugar (illness, vomiting). Call clinic if positive. 7/16/24      amLODIPine (NORVASC) 5 MG tablet Take 1 tablet by mouth Daily. 12/17/24   Ai Shipley MD   atorvastatin (LIPITOR) 40 MG tablet Take 1 tablet by mouth every night at bedtime. 1/21/25   Mayela Bruce APRN   B Complex-C (B-complex with vitamin C) tablet Take 1 tablet by mouth Daily. 1/21/25 1/21/26  Mayela Bruce APRN   Blood Glucose Monitoring Suppl (ONE TOUCH ULTRA MINI) w/Device kit 1 each by Other route 3 (Three) Times a Day Before Meals. 5/1/24   Mayela Bruce APRN   buPROPion XL (Wellbutrin XL) 300 MG 24 hr tablet Take 1 tablet by mouth Every Morning. 1/21/25   Mayela Bruce APRN   Continuous Glucose  (FreeStyle Lisbeth 3 [place]) device Use 1 each Every 14 (Fourteen) Days. 6/17/24   Mayela Bruce APRN   Continuous Glucose Sensor (FreeStyle Lisbeth 3 Sensor) misc Use 1 each Every 14 (Fourteen) Days. 7/29/24   Mayela Bruce APRN   famotidine (PEPCID) 20 MG tablet Take 1 tablet by mouth every night at bedtime. 11/18/24   Yvonne Sigala APRN   famotidine (PEPCID) 40 MG tablet Take 1 tablet by mouth 2 (Two) Times a Day. 1/21/25   Yvonne Sigala APRN   fenofibrate (TRICOR) 145 MG tablet Take 1 tablet by mouth Daily. 1/21/25   Mayela Bruce APRN   folic acid (FOLVITE) 1 MG tablet Take 1 tablet by mouth Daily. 1/21/25   Mayela Bruce APRN   glucose blood test strip 1 each by Other route 3 (Three) Times a Day Before Meals. Use as instructed 6/16/23   Jaciel Ponce DO   glucose monitor monitoring kit Use 1 each 3 (Three) Times a Day Before Meals. Pharmacist Choice Use as directed to check blood sugars TID DX: E11.9 1/21/25   Mayela Bruce APRN    hydrOXYzine (ATARAX) 25 MG tablet Take 1 tablet by mouth Every 6 (Six) Hours As Needed for Itching. 1/21/25   Mayela Bruce APRN   insulin aspart (NovoLOG FlexPen) 100 UNIT/ML solution pen-injector sc pen Inject 3 units before meals plus 1:50>150 as needed. MDD: 30 units 1/21/25   Mayela Bruce APRN   Insulin Aspart FlexPen 100 UNIT/ML solution pen-injector Inject 4 units before meals plus 1:50>150 as needed. MDD: 30 units 1/21/25   Mayela Bruce APRN   insulin degludec (Tresiba FlexTouch) 100 UNIT/ML solution pen-injector injection Inject 30 Units under the skin into the appropriate area as directed Every Night. 1/21/25   Mayela Bruce APRN   insulin degludec (Tresiba FlexTouch) 100 UNIT/ML solution pen-injector injection Inject 32 Units under the skin into the appropriate area as directed Every Morning.Titrated as directed. MDD: 60 units 1/21/25   Mayela Bruce APRN   Insulin Pen Needle (BD Pen Needle Imelda 2nd Gen) 32G X 4 MM misc Use 1 each Daily as directed. 10/1/24   Mayela Bruce APRN   Insulin Pen Needle (BD Pen Needle Imelda 2nd Gen) 32G X 4 MM misc Inject Insulin 4 times a day 1/21/25   Mayela Bruce APRN   Jardiance 25 MG tablet tablet Take 1 tablet by mouth Daily. 1/21/25 1/21/26  Mayela Bruce APRN   linaclotide (Linzess) 145 MCG capsule capsule Take 1 capsule by mouth Every Morning Before Breakfast. 6/3/24   Yvonne Sigala APRN   losartan (Cozaar) 100 MG tablet Take 1 tablet by mouth Daily. 1/21/25   Mayela Bruce APRN   OneTouch Delica Lancets 33G misc 1 each by Other route 3 (Three) Times a Day Before Meals. 6/16/23   Jaciel Ponce DO   pantoprazole (PROTONIX) 40 MG EC tablet Take 1 tablet by mouth 2 (Two) Times a Day Before Meals. 1/21/25   ZakiyaYvonne APRN   promethazine (PHENERGAN) 25 MG tablet Take 1 tablet by mouth Every 6 (Six) Hours As Needed for Nausea or Vomiting. 6/3/24   Yvonne Sigala APRN   TiZANidine (ZANAFLEX) 2 MG capsule  "Take 1 capsule by mouth 3 (Three) Times a Day As Needed for Muscle Spasms. 1/21/25   Mayela Bruce APRN   vitamin D (ERGOCALCIFEROL) 1.25 MG (42565 UT) capsule capsule Take 1 capsule by mouth 1 (One) Time Per Week. Take with a fatty meal 6/3/24   Mayela Bruce APRN        Social History:   Social History     Tobacco Use    Smoking status: Never    Smokeless tobacco: Never   Vaping Use    Vaping status: Never Used   Substance Use Topics    Alcohol use: No    Drug use: Yes     Frequency: 7.0 times per week     Types: Marijuana     Comment: at night to sleep- delta 8         Review of Systems:  Review of Systems   Gastrointestinal:  Positive for abdominal pain, nausea and vomiting.        Physical Exam:  /92   Pulse 97   Temp 98.6 °F (37 °C)   Resp 20   Ht 162.6 cm (64\")   Wt 97.8 kg (215 lb 9.8 oz)   SpO2 100%   BMI 37.01 kg/m²         General: Awake alert and in moderate to severe distress, actively vomiting in the emesis bag    HEENT: Head normocephalic atraumatic, eyes PERRLA EOMI, nose normal, oropharynx normal.  Mucous membranes of dry, dehydrated.  Face has multiple petechiae present from vomiting    Neck: Supple full range of motion, no meningismus, no lymphadenopathy    Heart: Mildly tachycardic with regular rhythm, no murmurs or rubs, 2+ radial pulses bilaterally    Lungs: Clear to auscultation bilaterally without wheezes or crackles, no respiratory distress    Abdomen: Soft, moderately tender diffusely mostly in the periumbilical area, obese, nondistended, no rebound or guarding    Skin: Warm, dry, no rash    Musculoskeletal: Normal range of motion, no lower extremity edema    Neurologic: Oriented x3, no motor deficits no sensory deficits    Psychiatric: Mood appears stable, no psychosis                  Medical Decision Making:      Comorbidities that affect care:    Chronic Kidney Disease, Diabetes    External Notes reviewed:    Previous Labs: I compared today's lab work to her " baseline labs and it looks like a creatinine of 1.3 at baseline has slightly worsened today to 1.5 in the setting of vomiting.      The following orders were placed and all results were independently analyzed by me:  Orders Placed This Encounter   Procedures    CT Abdomen Pelvis Without Contrast    XR Chest 1 View    [place] Draw    Comprehensive Metabolic Panel    Lipase    Urinalysis With Microscopic If Indicated (No Culture) - Urine, Clean Catch    CBC Auto Differential    Blood Gas, Venous -    Acetone    Lactic Acid, Plasma    Venous Blood Gas,Lactate    Blood Gas, Venous -    STAT Lactic Acid, Reflex    Comprehensive Metabolic Panel    Phosphorus    Magnesium    Osmolality, Serum    Hemoglobin A1c    High Sensitivity Troponin T    Basic Metabolic Panel    Magnesium    Phosphorus    CBC Auto Differential    NPO Diet NPO Type: Strict NPO    Undress & Gown    Vital Signs    Strict Intake & Output    Daily Weights    Continuous Pulse Oximetry    Saline Lock & Maintain IV Access    Corrected Serum Sodium = Measured Sodium + [1.6 x ((Glucose - 100)/100)]    Do NOT Discontinue Insulin Infusion Until 2 Hours After First Dose of Basal SQ Insulin    Prior to Initiating Glucommander™, Ensure All Prior Insulin Orders Are Discontinued    Do Not Start Insulin Infusion if Potassium < 3.3    Use a Dedicated Line for Insulin Infusion (If Possible).  May Use a Carrier Fluid of NS at KVO Rate if Insulin Rate is Insufficient to Maintain IV Patency.  Prime IV Line With Insulin Infusion    Glucommander Must Be Discontinued if Insulin Infusion is Discontinued.  If Insulin Infusion is Restarted, Previous Glucommander Settings Must Be Discontinued and Re-Entered From New Order    Once DKA Meets Resolution Criteria - Call Provider for Transition Orders From IV to SQ Insulin    Utilize the Start Meal Feature / Meal Bolus Feature in Glucommander if Patient Starts a Diet or Bolus Tube Feedings    Notify Provider - Insulin Infusion    RN  to Release PRN POC Glucose Orders Per Glucommander    RN to Order STAT Glucose For Any POC Glucose <10 or >600    If Insulin Infusion is Paused - Follow Glucommander Instructions    DKA / HHS Patients - Phosphorus of 1 mg/dL or Higher Does NOT Require Replacement (While Insulin Infusing)    Hospitalist (on-call MD unless specified)    Inpatient Diabetes Educator Consult    Patient is on Glucommander    Oxygen Therapy- Nasal Cannula; Titrate 1-6 LPM Per SpO2; 90 - 95%    POC Glucose STAT    POC Lactate    POC Electrolyte Panel    POC Glucose Once    POC Glucose PRN    ECG 12 Lead Electrolyte Imbalance    Insert Peripheral IV    Insert Peripheral IV x2    CBC & Differential    Green Top (Gel)    Lavender Top    Gold Top - SST    Light Blue Top    CBC & Differential       Medications Given in the Emergency Department:  Medications   sodium chloride 0.9 % flush 10 mL (has no administration in time range)   sodium chloride 0.9 % flush 10 mL (has no administration in time range)   sodium chloride 0.9 % flush 10 mL (has no administration in time range)   sodium chloride 0.9 % infusion 40 mL (has no administration in time range)   dextrose (GLUTOSE) oral gel 15 g (has no administration in time range)   dextrose (D50W) (25 g/50 mL) IV injection 10-50 mL (has no administration in time range)   glucagon (GLUCAGEN) injection 1 mg (has no administration in time range)   sodium chloride 0.9 % bolus (has no administration in time range)   sodium chloride 0.9 % infusion (has no administration in time range)   sodium chloride 0.9 % with KCl 20 mEq/L infusion (has no administration in time range)   sodium chloride 0.9 % with KCl 40 mEq/L infusion (has no administration in time range)   dextrose 5 % and sodium chloride 0.9 % infusion (has no administration in time range)   dextrose 5 % and sodium chloride 0.9 % with KCl 20 mEq/L infusion (has no administration in time range)   dextrose 5 % and sodium chloride 0.9 % with KCl 40 mEq/L  infusion (has no administration in time range)   sodium chloride 0.45 % infusion (has no administration in time range)   sodium chloride 0.45 % with KCl 20 mEq/L infusion (has no administration in time range)   sodium chloride 0.45 % 1,000 mL with potassium chloride 40 mEq infusion (has no administration in time range)   dextrose 5 % and sodium chloride 0.45 % infusion (has no administration in time range)   dextrose 5 % and sodium chloride 0.45 % with KCl 20 mEq/L infusion (has no administration in time range)   dextrose 5 % and sodium chloride 0.45 % with KCl 40 mEq/L infusion (has no administration in time range)   insulin regular 1 unit/mL in 0.9% sodium chloride (Glucommander) (has no administration in time range)   Potassium Replacement - Follow Nurse / BPA Driven Protocol (has no administration in time range)   Magnesium Standard Dose Replacement - Follow Nurse / BPA Driven Protocol (has no administration in time range)   Phosphorus Replacement - Follow Nurse / BPA Driven Protocol (has no administration in time range)   Calcium Replacement - Follow Nurse / BPA Driven Protocol (has no administration in time range)   sodium chloride 0.9 % bolus 1,000 mL (0 mL Intravenous Stopped 2/2/25 1915)   HYDROmorphone (DILAUDID) injection 1 mg (1 mg Intravenous Given 2/2/25 1748)   metoclopramide (REGLAN) injection 10 mg (10 mg Intravenous Given 2/2/25 1752)   sodium chloride 0.9 % bolus 1,000 mL (0 mL Intravenous Stopped 2/2/25 1915)        ED Course:         Labs:    Lab Results (last 24 hours)       Procedure Component Value Units Date/Time    CBC & Differential [[id number]]  (Abnormal) Collected: 02/02/25 1445    Specimen: Blood from Arm, Right Updated: 02/02/25 1458    Narrative:      The following orders were created for panel order CBC & Differential.  Procedure                               Abnormality         Status                     ---------                               -----------         ------                      CBC Auto Differential[[id number]]        Abnormal            Final result                 Please view results for these tests on the individual orders.    Comprehensive Metabolic Panel [[id number]]  (Abnormal) Collected: 02/02/25 1445    Specimen: Blood from Arm, Right Updated: 02/02/25 1554     Glucose 250 mg/dL      BUN 34 mg/dL      Creatinine 1.50 mg/dL      Sodium 139 mmol/L      Potassium 4.5 mmol/L      Chloride 103 mmol/L      CO2 16.1 mmol/L      Calcium 10.0 mg/dL      Total Protein 8.2 g/dL      Albumin 4.2 g/dL      ALT (SGPT) 24 U/L      AST (SGOT) 31 U/L      Alkaline Phosphatase 64 U/L      Total Bilirubin 0.6 mg/dL      Globulin 4.0 gm/dL      A/G Ratio 1.1 g/dL      BUN/Creatinine Ratio 22.7     Anion Gap 19.9 mmol/L      eGFR 43.9 mL/min/1.73     Narrative:      GFR Categories in Chronic Kidney Disease (CKD)      GFR Category          GFR (mL/min/1.73)    Interpretation  G1                     90 or greater         Normal or high (1)  G2                      60-89                Mild decrease (1)  G3a                   45-59                Mild to moderate decrease  G3b                   30-44                Moderate to severe decrease  G4                    15-29                Severe decrease  G5                    14 or less           Kidney failure          (1)In the absence of evidence of kidney disease, neither GFR category G1 or G2 fulfill the criteria for CKD.    eGFR calculation 2021 CKD-EPI creatinine equation, which does not include race as a factor    Lipase [[id number]]  (Normal) Collected: 02/02/25 1445    Specimen: Blood from Arm, Right Updated: 02/02/25 1554     Lipase 33 U/L     CBC Auto Differential [[id number]]  (Abnormal) Collected: 02/02/25 1445    Specimen: Blood from Arm, Right Updated: 02/02/25 1458     WBC 17.40 10*3/mm3      RBC 5.34 10*6/mm3      Hemoglobin 14.5 g/dL      Hematocrit 44.6 %      MCV 83.5 fL      MCH 27.2 pg      MCHC 32.5 g/dL      RDW 13.8 %       RDW-SD 41.9 fl      MPV 9.6 fL      Platelets 406 10*3/mm3      Neutrophil % 92.2 %      Lymphocyte % 2.6 %      Monocyte % 3.4 %      Eosinophil % 0.9 %      Basophil % 0.4 %      Immature Grans % 0.5 %      Neutrophils, Absolute 16.05 10*3/mm3      Lymphocytes, Absolute 0.45 10*3/mm3      Monocytes, Absolute 0.59 10*3/mm3      Eosinophils, Absolute 0.16 10*3/mm3      Basophils, Absolute 0.07 10*3/mm3      Immature Grans, Absolute 0.08 10*3/mm3      nRBC 0.0 /100 WBC     Acetone [[id number]]  (Normal) Collected: 02/02/25 1445    Specimen: Blood from Arm, Right Updated: 02/02/25 1744     Acetone Negative    Lactic Acid, Plasma [[id number]]  (Normal) Collected: 02/02/25 1745    Specimen: Blood Updated: 02/02/25 1812     Lactate 2.0 mmol/L     POC Glucose STAT [[id number]]  (Abnormal) Collected: 02/02/25 1752    Specimen: Blood Updated: 02/02/25 1756     Glucose 229 mg/dL      Comment: Serial Number: [id number]:  [id number]       Blood Gas, Venous - [[id number]]  (Abnormal) Collected: 02/02/25 1752    Specimen: Venous Blood Updated: 02/02/25 1756     pH, Venous 7.514 pH Units      pCO2, Venous 24.0 mm Hg      pO2, Venous 39.4 mm Hg      HCO3, Venous 19.3 mmol/L      Base Excess, Venous -1.5 mmol/L      Comment: Serial Number: [id number]:  [id number]        O2 Saturation, Venous 81.1 %      Hemoglobin, Blood Gas 16.1 g/dL      Barometric Pressure for Blood Gas 744.3000 mmHg      Modality Room Air     Notified Who Syers    POC Lactate [[id number]]  (Abnormal) Collected: 02/02/25 1752    Specimen: Venous Blood Updated: 02/02/25 1756     Lactate 2.1 mmol/L      Comment: Serial Number: [id number]:  [id number]       POC Electrolyte Panel [[id number]]  (Abnormal) Collected: 02/02/25 1752    Specimen: Venous Blood Updated: 02/02/25 1756     Sodium 143 mmol/L      POC Potassium 4.1 mmol/L      Chloride 109 mmol/L      Ionized Calcium 1.13 mmol/L      Comment: Serial Number: [id number]:  [id number]       POC Glucose Once [[id number]]   (Abnormal) Collected: 02/02/25 1911    Specimen: Blood Updated: 02/02/25 1913     Glucose 188 mg/dL      Comment: Serial Number: [id number]:  [id number]                Imaging:    CT Abdomen Pelvis Without Contrast    Result Date: 2/2/2025  CT ABDOMEN PELVIS WO CONTRAST Date of Exam: 2/2/2025 6:37 PM EST Indication: Generalized abdominal pain, vomiting. Comparison: 6/13/2023 Technique: Axial CT images were obtained of the abdomen and pelvis without the administration of contrast. Reconstructed coronal and sagittal images were also obtained. Automated exposure control and iterative construction methods were used. Findings: There is some minimal atelectasis in the lower lobes. There is atherosclerotic disease with no aortic aneurysm. Gallbladder unremarkable. No biliary obstruction. No renal or ureteral stones. No hydronephrosis. The unenhanced solid abdominal organs are normal. Urinary bladder is normal. Uterus is surgically absent. The appendix is normal. Large bowel is normal with no evidence of colitis. There are a few mildly dilated small bowel loops which may reflect a mild ileus. No bowel obstruction or definite evidence of enteritis identified. Stomach is normal. No adenopathy or free fluid.     1.There are a few mildly dilated small bowel loops which may reflect a mild ileus. No bowel obstruction or definite evidence of enteritis identified. 2.No other acute findings in the abdomen or pelvis. Electronically Signed: Brian Blackwood MD  2/2/2025 7:15 PM EST  Workstation ID: [id number]       Differential Diagnosis and Discussion:    Abdominal Pain: Based on the patient's signs and symptoms, I considered abdominal aortic aneurysm, small bowel obstruction, pancreatitis, acute cholecystitis, acute appendecitis, peptic ulcer disease, gastritis, colitis, endocrine disorders, irritable bowel syndrome and other differential diagnosis an etiology of the patient's abdominal pain.  Vomiting: Differential diagnosis  includes but is not limited to migraine, labyrinthine disorders, psychogenic, metabolic and endocrine causes, peptic ulcer, gastric outlet obstruction, gastritis, gastroenteritis, appendicitis, intestinal obstruction, paralytic ileus, food poisoning, cholecystitis, acute hepatitis, acute pancreatitis, acute febrile illness, and myocardial infarction.    PROCEDURES:    Labs were collected in the emergency department and all labs were reviewed and interpreted by me.  CT scan was performed in the emergency department and the CT scan radiology impression was interpreted by me.    ECG 12 Lead Electrolyte Imbalance    (Results Pending)       Procedures    MDM     Amount and/or Complexity of Data Reviewed  Clinical lab tests: reviewed  Tests in the radiology section of CPT®: reviewed  Decide to obtain previous medical records or to obtain history from someone other than the patient: yes           This patient is a 44-year-old female with insulin-dependent diabetes presenting with severe nausea and vomiting, generalized abdominal pain.    Lab work concerning for DKA with blood sugar of 250, low serum bicarbonate of and widened anion gap, and I will check ketones and also a blood gas.    I am hydrating her with IV fluids and giving her antiemetics and pain medicine for symptom relief and we will start insulin once her potassium has resulted.    CT scan came back negative for any acute surgical process but shows ileus.    Recheck blood sugar after fluids is 188, but we will start insulin drip for euglycemic DKA management, given patient's use of Jardiance for diabetes.    Will proceed with hospitalization for DKA management.        Critical care:  Total Critical Care time of 35 minutes. Total critical care time documented does not include time spent on separately billed procedures for services of nurses or physician assistants. I personally saw and examined the patient. I have reviewed all diagnostic interpretations and  treatment plans as written. I was present for the key portions of any procedures performed and the inclusive time noted in any critical care statement. Critical care time includes patient management by me, time spent at the patients bedside,  time to review lab and imaging results, discussing patient care, documentation in the medical record, and time spent with family or caregiver.          Patient Care Considerations:          Consultants/Shared Management Plan:    Hospitalist: I have discussed the case with the admitting hospitalist who agrees to accept the patient for admission.    Social Determinants of Health:    Patient is independent, reliable, and has access to care.       Disposition and Care Coordination:    Admit:   Through independent evaluation of the patient's history, physical, and imperical data, the patient meets criteria for inpatient admission to the hospital.        Final diagnoses:   Diabetic ketoacidosis without coma associated with type 2 diabetes mellitus   Nausea and vomiting, unspecified vomiting type   Acute generalized abdominal pain   Ileus        ED Disposition       ED Disposition   Decision to Admit    Condition   --    Comment   --               This medical record created using voice recognition software.             Mann Ríos MD  02/02/25 1933

## 2025-02-03 LAB
ANION GAP SERPL CALCULATED.3IONS-SCNC: 10.3 MMOL/L (ref 5–15)
ANION GAP SERPL CALCULATED.3IONS-SCNC: 12 MMOL/L (ref 5–15)
ANION GAP SERPL CALCULATED.3IONS-SCNC: 13.5 MMOL/L (ref 5–15)
BASOPHILS # BLD AUTO: 0.03 10*3/MM3 (ref 0–0.2)
BASOPHILS NFR BLD AUTO: 0.3 % (ref 0–1.5)
BUN SERPL-MCNC: 24 MG/DL (ref 6–20)
BUN SERPL-MCNC: 27 MG/DL (ref 6–20)
BUN SERPL-MCNC: 29 MG/DL (ref 6–20)
BUN/CREAT SERPL: 20.5 (ref 7–25)
BUN/CREAT SERPL: 22 (ref 7–25)
BUN/CREAT SERPL: 23.4 (ref 7–25)
CALCIUM SPEC-SCNC: 7.6 MG/DL (ref 8.6–10.5)
CALCIUM SPEC-SCNC: 7.8 MG/DL (ref 8.6–10.5)
CALCIUM SPEC-SCNC: 8.4 MG/DL (ref 8.6–10.5)
CHLORIDE SERPL-SCNC: 106 MMOL/L (ref 98–107)
CHLORIDE SERPL-SCNC: 107 MMOL/L (ref 98–107)
CHLORIDE SERPL-SCNC: 108 MMOL/L (ref 98–107)
CO2 SERPL-SCNC: 18.5 MMOL/L (ref 22–29)
CO2 SERPL-SCNC: 19 MMOL/L (ref 22–29)
CO2 SERPL-SCNC: 19.7 MMOL/L (ref 22–29)
CREAT SERPL-MCNC: 1.17 MG/DL (ref 0.57–1)
CREAT SERPL-MCNC: 1.23 MG/DL (ref 0.57–1)
CREAT SERPL-MCNC: 1.24 MG/DL (ref 0.57–1)
DEPRECATED RDW RBC AUTO: 42.8 FL (ref 37–54)
EGFRCR SERPLBLD CKD-EPI 2021: 55.1 ML/MIN/1.73
EGFRCR SERPLBLD CKD-EPI 2021: 55.7 ML/MIN/1.73
EGFRCR SERPLBLD CKD-EPI 2021: 59.1 ML/MIN/1.73
EOSINOPHIL # BLD AUTO: 0.01 10*3/MM3 (ref 0–0.4)
EOSINOPHIL NFR BLD AUTO: 0.1 % (ref 0.3–6.2)
ERYTHROCYTE [DISTWIDTH] IN BLOOD BY AUTOMATED COUNT: 13.9 % (ref 12.3–15.4)
GEN 5 1HR TROPONIN T REFLEX: 17 NG/L
GLUCOSE BLDC GLUCOMTR-MCNC: 123 MG/DL (ref 70–99)
GLUCOSE BLDC GLUCOMTR-MCNC: 128 MG/DL (ref 70–99)
GLUCOSE BLDC GLUCOMTR-MCNC: 131 MG/DL (ref 70–99)
GLUCOSE BLDC GLUCOMTR-MCNC: 136 MG/DL (ref 70–99)
GLUCOSE BLDC GLUCOMTR-MCNC: 162 MG/DL (ref 70–99)
GLUCOSE BLDC GLUCOMTR-MCNC: 165 MG/DL (ref 70–99)
GLUCOSE BLDC GLUCOMTR-MCNC: 173 MG/DL (ref 70–99)
GLUCOSE BLDC GLUCOMTR-MCNC: 182 MG/DL (ref 70–99)
GLUCOSE BLDC GLUCOMTR-MCNC: 189 MG/DL (ref 70–99)
GLUCOSE BLDC GLUCOMTR-MCNC: 191 MG/DL (ref 70–99)
GLUCOSE BLDC GLUCOMTR-MCNC: 195 MG/DL (ref 70–99)
GLUCOSE BLDC GLUCOMTR-MCNC: 204 MG/DL (ref 70–99)
GLUCOSE BLDC GLUCOMTR-MCNC: 84 MG/DL (ref 70–99)
GLUCOSE BLDC GLUCOMTR-MCNC: 93 MG/DL (ref 70–99)
GLUCOSE SERPL-MCNC: 124 MG/DL (ref 65–99)
GLUCOSE SERPL-MCNC: 200 MG/DL (ref 65–99)
GLUCOSE SERPL-MCNC: 210 MG/DL (ref 65–99)
HBA1C MFR BLD: 6.9 % (ref 4.8–5.6)
HCT VFR BLD AUTO: 35.2 % (ref 34–46.6)
HGB BLD-MCNC: 11.3 G/DL (ref 12–15.9)
IMM GRANULOCYTES # BLD AUTO: 0.04 10*3/MM3 (ref 0–0.05)
IMM GRANULOCYTES NFR BLD AUTO: 0.3 % (ref 0–0.5)
LYMPHOCYTES # BLD AUTO: 0.85 10*3/MM3 (ref 0.7–3.1)
LYMPHOCYTES NFR BLD AUTO: 7.2 % (ref 19.6–45.3)
MAGNESIUM SERPL-MCNC: 1.7 MG/DL (ref 1.6–2.6)
MAGNESIUM SERPL-MCNC: 1.7 MG/DL (ref 1.6–2.6)
MAGNESIUM SERPL-MCNC: 1.8 MG/DL (ref 1.6–2.6)
MCH RBC QN AUTO: 27 PG (ref 26.6–33)
MCHC RBC AUTO-ENTMCNC: 32.1 G/DL (ref 31.5–35.7)
MCV RBC AUTO: 84 FL (ref 79–97)
MONOCYTES # BLD AUTO: 0.39 10*3/MM3 (ref 0.1–0.9)
MONOCYTES NFR BLD AUTO: 3.3 % (ref 5–12)
NEUTROPHILS NFR BLD AUTO: 10.51 10*3/MM3 (ref 1.7–7)
NEUTROPHILS NFR BLD AUTO: 88.8 % (ref 42.7–76)
NRBC BLD AUTO-RTO: 0 /100 WBC (ref 0–0.2)
PHOSPHATE SERPL-MCNC: 2.3 MG/DL (ref 2.5–4.5)
PHOSPHATE SERPL-MCNC: 2.5 MG/DL (ref 2.5–4.5)
PHOSPHATE SERPL-MCNC: 3 MG/DL (ref 2.5–4.5)
PLATELET # BLD AUTO: 306 10*3/MM3 (ref 140–450)
PMV BLD AUTO: 9.4 FL (ref 6–12)
POTASSIUM SERPL-SCNC: 3.5 MMOL/L (ref 3.5–5.2)
POTASSIUM SERPL-SCNC: 3.8 MMOL/L (ref 3.5–5.2)
POTASSIUM SERPL-SCNC: 4.1 MMOL/L (ref 3.5–5.2)
RBC # BLD AUTO: 4.19 10*6/MM3 (ref 3.77–5.28)
SODIUM SERPL-SCNC: 138 MMOL/L (ref 136–145)
TROPONIN T % DELTA: 13
TROPONIN T NUMERIC DELTA: 2 NG/L
WBC NRBC COR # BLD AUTO: 11.83 10*3/MM3 (ref 3.4–10.8)

## 2025-02-03 PROCEDURE — 82948 REAGENT STRIP/BLOOD GLUCOSE: CPT

## 2025-02-03 PROCEDURE — 99233 SBSQ HOSP IP/OBS HIGH 50: CPT | Performed by: INTERNAL MEDICINE

## 2025-02-03 PROCEDURE — 99291 CRITICAL CARE FIRST HOUR: CPT | Performed by: INTERNAL MEDICINE

## 2025-02-03 PROCEDURE — 84484 ASSAY OF TROPONIN QUANT: CPT | Performed by: EMERGENCY MEDICINE

## 2025-02-03 PROCEDURE — 25010000002 ONDANSETRON PER 1 MG: Performed by: HOSPITALIST

## 2025-02-03 PROCEDURE — 94799 UNLISTED PULMONARY SVC/PX: CPT

## 2025-02-03 PROCEDURE — 63710000001 INSULIN GLARGINE PER 5 UNITS: Performed by: INTERNAL MEDICINE

## 2025-02-03 PROCEDURE — 83735 ASSAY OF MAGNESIUM: CPT | Performed by: EMERGENCY MEDICINE

## 2025-02-03 PROCEDURE — 94640 AIRWAY INHALATION TREATMENT: CPT

## 2025-02-03 PROCEDURE — 25010000002 HYDRALAZINE PER 20 MG: Performed by: HOSPITALIST

## 2025-02-03 PROCEDURE — 25010000002 MAGNESIUM SULFATE 2 GM/50ML SOLUTION: Performed by: NURSE PRACTITIONER

## 2025-02-03 PROCEDURE — 85025 COMPLETE CBC W/AUTO DIFF WBC: CPT | Performed by: HOSPITALIST

## 2025-02-03 PROCEDURE — 84100 ASSAY OF PHOSPHORUS: CPT | Performed by: EMERGENCY MEDICINE

## 2025-02-03 PROCEDURE — 25010000002 PROCHLORPERAZINE 10 MG/2ML SOLUTION: Performed by: INTERNAL MEDICINE

## 2025-02-03 PROCEDURE — 25810000003 SODIUM CHLORIDE 0.9 % SOLUTION: Performed by: NURSE PRACTITIONER

## 2025-02-03 PROCEDURE — 80048 BASIC METABOLIC PNL TOTAL CA: CPT | Performed by: EMERGENCY MEDICINE

## 2025-02-03 PROCEDURE — 63710000001 INSULIN LISPRO (HUMAN) PER 5 UNITS: Performed by: INTERNAL MEDICINE

## 2025-02-03 PROCEDURE — 82948 REAGENT STRIP/BLOOD GLUCOSE: CPT | Performed by: INTERNAL MEDICINE

## 2025-02-03 RX ORDER — NICOTINE POLACRILEX 4 MG
15 LOZENGE BUCCAL
Status: DISCONTINUED | OUTPATIENT
Start: 2025-02-03 | End: 2025-02-04 | Stop reason: HOSPADM

## 2025-02-03 RX ORDER — IBUPROFEN 600 MG/1
1 TABLET ORAL
Status: DISCONTINUED | OUTPATIENT
Start: 2025-02-03 | End: 2025-02-04 | Stop reason: HOSPADM

## 2025-02-03 RX ORDER — HYDROXYZINE HYDROCHLORIDE 25 MG/1
25 TABLET, FILM COATED ORAL EVERY 6 HOURS PRN
Status: DISCONTINUED | OUTPATIENT
Start: 2025-02-03 | End: 2025-02-04 | Stop reason: HOSPADM

## 2025-02-03 RX ORDER — INSULIN LISPRO 100 [IU]/ML
3-14 INJECTION, SOLUTION INTRAVENOUS; SUBCUTANEOUS
Status: DISCONTINUED | OUTPATIENT
Start: 2025-02-03 | End: 2025-02-04 | Stop reason: HOSPADM

## 2025-02-03 RX ORDER — AMLODIPINE BESYLATE 5 MG/1
5 TABLET ORAL DAILY
Status: DISCONTINUED | OUTPATIENT
Start: 2025-02-03 | End: 2025-02-04 | Stop reason: HOSPADM

## 2025-02-03 RX ORDER — PROCHLORPERAZINE EDISYLATE 5 MG/ML
5 INJECTION INTRAMUSCULAR; INTRAVENOUS EVERY 6 HOURS PRN
Status: DISCONTINUED | OUTPATIENT
Start: 2025-02-03 | End: 2025-02-04

## 2025-02-03 RX ORDER — FOLIC ACID 1 MG/1
1 TABLET ORAL DAILY
Status: DISCONTINUED | OUTPATIENT
Start: 2025-02-03 | End: 2025-02-04 | Stop reason: HOSPADM

## 2025-02-03 RX ORDER — MAGNESIUM SULFATE HEPTAHYDRATE 40 MG/ML
2 INJECTION, SOLUTION INTRAVENOUS ONCE
Status: COMPLETED | OUTPATIENT
Start: 2025-02-03 | End: 2025-02-04

## 2025-02-03 RX ORDER — DEXTROSE MONOHYDRATE 25 G/50ML
25 INJECTION, SOLUTION INTRAVENOUS
Status: DISCONTINUED | OUTPATIENT
Start: 2025-02-03 | End: 2025-02-04 | Stop reason: HOSPADM

## 2025-02-03 RX ORDER — ATORVASTATIN CALCIUM 40 MG/1
40 TABLET, FILM COATED ORAL DAILY
Status: DISCONTINUED | OUTPATIENT
Start: 2025-02-03 | End: 2025-02-04 | Stop reason: HOSPADM

## 2025-02-03 RX ORDER — FENTANYL/ROPIVACAINE/NS/PF 2-625MCG/1
15 PLASTIC BAG, INJECTION (ML) EPIDURAL ONCE
Status: COMPLETED | OUTPATIENT
Start: 2025-02-03 | End: 2025-02-04

## 2025-02-03 RX ORDER — BUPROPION HYDROCHLORIDE 150 MG/1
300 TABLET ORAL EVERY MORNING
Status: DISCONTINUED | OUTPATIENT
Start: 2025-02-03 | End: 2025-02-04 | Stop reason: HOSPADM

## 2025-02-03 RX ADMIN — PROCHLORPERAZINE EDISYLATE 5 MG: 5 INJECTION INTRAMUSCULAR; INTRAVENOUS at 20:15

## 2025-02-03 RX ADMIN — MAGNESIUM SULFATE HEPTAHYDRATE 2 G: 40 INJECTION, SOLUTION INTRAVENOUS at 09:19

## 2025-02-03 RX ADMIN — POTASSIUM CHLORIDE, DEXTROSE MONOHYDRATE AND SODIUM CHLORIDE 125 ML/HR: 150; 5; 450 INJECTION, SOLUTION INTRAVENOUS at 04:38

## 2025-02-03 RX ADMIN — BUPROPION HYDROCHLORIDE 300 MG: 150 TABLET, EXTENDED RELEASE ORAL at 09:19

## 2025-02-03 RX ADMIN — ACETAMINOPHEN 650 MG: 325 TABLET ORAL at 04:39

## 2025-02-03 RX ADMIN — INSULIN LISPRO 3 UNITS: 100 INJECTION, SOLUTION INTRAVENOUS; SUBCUTANEOUS at 21:51

## 2025-02-03 RX ADMIN — Medication 10 ML: at 20:15

## 2025-02-03 RX ADMIN — ONDANSETRON 4 MG: 2 INJECTION INTRAMUSCULAR; INTRAVENOUS at 09:29

## 2025-02-03 RX ADMIN — POTASSIUM PHOSPHATE, MONOBASIC AND POTASSIUM PHOSPHATE, DIBASIC 15 MMOL: 224; 236 INJECTION, SOLUTION, CONCENTRATE INTRAVENOUS at 09:19

## 2025-02-03 RX ADMIN — Medication 10 ML: at 21:51

## 2025-02-03 RX ADMIN — INSULIN GLARGINE 30 UNITS: 100 INJECTION, SOLUTION SUBCUTANEOUS at 09:19

## 2025-02-03 RX ADMIN — FAMOTIDINE 40 MG: 20 TABLET ORAL at 09:18

## 2025-02-03 RX ADMIN — HYDROXYZINE HYDROCHLORIDE 25 MG: 25 TABLET, FILM COATED ORAL at 21:50

## 2025-02-03 RX ADMIN — HYDRALAZINE HYDROCHLORIDE 10 MG: 20 INJECTION INTRAMUSCULAR; INTRAVENOUS at 16:26

## 2025-02-03 RX ADMIN — AMLODIPINE BESYLATE 5 MG: 5 TABLET ORAL at 09:19

## 2025-02-03 RX ADMIN — HYDRALAZINE HYDROCHLORIDE 10 MG: 20 INJECTION INTRAMUSCULAR; INTRAVENOUS at 23:39

## 2025-02-03 RX ADMIN — ACETAMINOPHEN 650 MG: 325 TABLET ORAL at 16:14

## 2025-02-03 RX ADMIN — FAMOTIDINE 40 MG: 20 TABLET ORAL at 21:50

## 2025-02-03 RX ADMIN — MUPIROCIN 1 APPLICATION: 20 OINTMENT TOPICAL at 21:51

## 2025-02-03 RX ADMIN — ATORVASTATIN CALCIUM 40 MG: 40 TABLET, FILM COATED ORAL at 09:19

## 2025-02-03 RX ADMIN — FOLIC ACID 1 MG: 1 TABLET ORAL at 09:18

## 2025-02-03 RX ADMIN — IPRATROPIUM BROMIDE AND ALBUTEROL SULFATE 3 ML: .5; 3 SOLUTION RESPIRATORY (INHALATION) at 00:31

## 2025-02-03 NOTE — CONSULTS
"Nutrition Services    Patient Name: Liyah Butts  YOB: 1980  MRN: [id number]  Admission date: 2/2/2025      CLINICAL NUTRITION ASSESSMENT      Reason for Assessment  Physician Consult and Tube Feeding Assessment   H&P:  Past Medical History:   Diagnosis Date    Anxiety 2021    Anxiety    Brain tumor (benign)     pituitary gland    Brain tumor (benign)     pituatary gland    Cancer     ovarian; history of    Depression 2021    Diabetes mellitus     GERD (gastroesophageal reflux disease) 2023    Gastritis    Headache 2003    Hyperlipidemia 2018    Hypertension 2018    Obesity 1980s    Renal insufficiency 2024    Visual impairment         Current Problems:   Active Hospital Problems    Diagnosis     **DKA (diabetic ketoacidosis)         Nutrition/Diet History         Narrative   Pt presented to ED for evaluation of vomiting. Admitted with DKA. RD consulted to provide tube feeding assessment. Currently in ICU. Discussed during critical care rounds this morning. Patient's anion gap is closed and she is tolerating her diet. At this time, EN not needed. Will CTM for adequate po intake.     Anthropometrics        Current Height, Weight Height: 162.6 cm (64\")  Weight: 101 kg (222 lb 10.6 oz)   Current BMI Body mass index is 38.22 kg/m².   BMI Classification Obese Class II   % %   Adjusted Body Weight (ABW) 66 kg   Weight Hx  Wt Readings from Last 30 Encounters:   02/03/25 0618 101 kg (222 lb 10.6 oz)   02/02/25 2207 102 kg (224 lb 13.9 oz)   02/02/25 1758 97.8 kg (215 lb 9.8 oz)   12/17/24 1852 96.5 kg (212 lb 11.9 oz)   08/28/24 1523 97.5 kg (215 lb)   07/02/24 1423 93.7 kg (206 lb 9.6 oz)   05/01/24 1534 93.8 kg (206 lb 12.8 oz)   04/15/24 1136 91.2 kg (201 lb)   01/30/24 1502 88.9 kg (196 lb)   12/28/23 1506 91.4 kg (201 lb 6.4 oz)   09/28/23 1509 90.1 kg (198 lb 9.6 oz)   08/23/23 1302 90.4 kg (199 lb 6.4 oz)   06/28/23 1438 93.5 kg (206 lb 3.2 oz)   06/21/23 0952 91.2 kg (201 lb)   06/20/23 " "1413 92 kg (202 lb 12.8 oz)   06/16/23 0531 87.6 kg (193 lb 2 oz)   06/15/23 0500 90.1 kg (198 lb 10.2 oz)   06/14/23 0340 89.8 kg (197 lb 15.6 oz)   06/13/23 2026 90.7 kg (200 lb)   06/12/23 1629 90.7 kg (200 lb)   05/23/23 1111 98.6 kg (217 lb 6.4 oz)   05/03/23 2221 93.4 kg (205 lb 14.6 oz)   10/01/22 1627 99 kg (218 lb 4.1 oz)   07/17/22 1400 96.1 kg (211 lb 13.8 oz)   02/09/18 2105 95.3 kg (210 lb)   05/01/14 0952 98.9 kg (217 lb 15.9 oz)          Wt Change Observation Stable/ + trend     Estimated/Assessed Needs  Estimated Needs based on: Adjusted Body Weight       Energy Requirements 25-30 kcal/kg   EST Needs (kcal/day) 1650-1980       Protein Requirements 1.0-1.2 g/kg   EST Daily Needs (g/day) 66-79       Fluid Requirements 1 ml/kcal    Estimated Needs (mL/day) 1650-1980     Labs/Medications         Pertinent Labs Reviewed.   Results from last 7 days   Lab Units 02/03/25  0815 02/03/25  0348 02/03/25  0020 02/02/25  2041 02/02/25  1445   SODIUM mmol/L 138 138 138 140 139   POTASSIUM mmol/L 3.5 3.8 4.1 4.1 4.5   CHLORIDE mmol/L 108* 107 106 105 103   CO2 mmol/L 19.7* 19.0* 18.5* 18.9* 16.1*   BUN mg/dL 24* 27* 29* 29* 34*   CREATININE mg/dL 1.17* 1.23* 1.24* 1.42* 1.50*   CALCIUM mg/dL 7.8* 7.6* 8.4* 8.9 10.0   BILIRUBIN mg/dL  --   --   --  0.4 0.6   ALK PHOS U/L  --   --   --  55 64   ALT (SGPT) U/L  --   --   --  25 24   AST (SGOT) U/L  --   --   --  33* 31   GLUCOSE mg/dL 124* 200* 210* 196* 250*     Results from last 7 days   Lab Units 02/03/25  0815 02/03/25  0348 02/03/25  0020   MAGNESIUM mg/dL 1.8 1.7 1.7   PHOSPHORUS mg/dL 2.3* 2.5 3.0   HEMOGLOBIN g/dL  --  11.3*  --    HEMATOCRIT %  --  35.2  --      No results found for: \"COVID19\"  Lab Results   Component Value Date    HGBA1C 6.90 (H) 02/02/2025         Pertinent Medications Reviewed.     Malnutrition Severity Assessment              Nutrition Diagnosis         Nutrition Dx Problem 1 Altered nutrition related lab values related to endocrine " dysfunction as evidenced by  A1C 6.90, .     Nutrition Intervention           Current Nutrition Orders & Evaluation of Intake       Current PO Diet Diet: Diabetic; Consistent Carbohydrate; Fluid Consistency: Thin (IDDSI 0)   Supplement Orders Placed This Encounter      Patient is on Glucommander           Nutrition Intervention/Prescription        Continue on diabetic/ carb consistent diet.        Medical Nutrition Therapy/Nutrition Education          Learner     Readiness N/A  N/A     Method     Response N/A  N/A     Monitor/Evaluation        Monitor Per protocol, PO intake, Pertinent labs, Weight, Symptoms, POC/GOC     Nutrition Discharge Plan         To be determined     Electronically signed by:  Kathia Suazo RD  02/03/25 12:53 EST

## 2025-02-03 NOTE — PLAN OF CARE
Goal Outcome Evaluation:  Plan of Care Reviewed With: patient, spouse        Progress: improving          Pt a/o times 4 vss cm sr sats 97% room air. Had supper after arrival to floor, meal bolus done. Pt tolerated well. Insulin drip

## 2025-02-03 NOTE — CONSULTS
Pulmonary / Critical Care Consult Note      Patient Name: Liyah Butts  : 1980  MRN: [id number]  Primary Care Physician:  Mayela Bruce APRN  Referring Physician: No ref. provider found  Date of admission: 2025    Subjective   Subjective     Reason for Consult/ Chief Complaint: Euglycemic ketoacidosis    HPI:  Liyah Butts is a 44 y.o. female with history of insulin-dependent diabetes mellitus, presented to the hospital with vomiting, dry heaves and abdominal pain for a day.  She has history of insulin-dependent diabetes and is on Jardiance.  In the ED, patient was found to have pH of 7.5, pCO2 24 and pO2 39 on VBG.  Blood glucose was 229.  Ionized calcium was 1.13 and lactate was 2.1.  WBC count was 17,000.  CT scan of the abdomen pelvis showed there were few mildly dilated small loops with concern of mild ileus.  No bowel obstruction was seen.  Patient was admitted with euglycemic ketoacidosis likely related to Jardiance.  Pulmonary critical services consulted for extensive management of her acute issues.  She also has history of daily marijuana use.  She has been on insulin drip, currently at 3.6 units/h.  She is on fluid exchange.  She has no nausea or vomiting now and overall improving.  She is tolerating oral diet now.  Anion gap has closed.  Has low potassium and magnesium this morning.    Review of Systems  General:  Fatigue, No Fever  HEENT: No dysphagia, No Visual Changes, no rhinorrhea  Respiratory: No cough,+Dyspnea, No Pleuritic Pain, no wheezing, no hemoptysis  Cardiovascular: Denies chest pain, denies palpitations,+ALFRED, No Chest Pressure  Gastrointestinal: Improving abdominal Pain, improved nausea, No Vomiting, dry heaves improving  Genitourinary:  No Dysuria, No Frequency, No Hesitancy  Musculoskeletal: No muscle pain or swelling  Endocrine:  No Heat Intolerance, No Cold Intolerance, No Fatigue  Hematologic:  No Bleeding, No Bruising  Psychiatric:  No Anxiety, No  Depression  Neurologic:  No Confusion, no Dysarthria, No Headaches  Skin:  No Rash, No Open Wounds        Personal History     Past Medical History:   Diagnosis Date    Anxiety 2021    Anxiety    Brain tumor (benign)     pituitary gland    Brain tumor (benign)     pituatary gland    Cancer     ovarian; history of    Depression 2021    Diabetes mellitus     GERD (gastroesophageal reflux disease) 2023    Gastritis    Headache 2003    Hyperlipidemia 2018    Hypertension 2018    Obesity 1980s    Renal insufficiency 2024    Visual impairment        Past Surgical History:   Procedure Laterality Date    ENDOSCOPY N/A 06/16/2023    Procedure: ESOPHAGOGASTRODUODENOSCOPY w/ biopsy;  Surgeon: Alyssa Damon MD;  Location[facility] END[facility];  Service: Gastroenterology;  Laterality: N/A;  gastritis    EYE SURGERY  Feb 2022    HYSTERECTOMY      UPPER GASTROINTESTINAL ENDOSCOPY         Family History: family history includes Arthritis in her mother; Diabetes in her mother; Drug abuse in her mother; Heart disease in her mother; Hyperlipidemia in her mother; Kidney disease in her paternal uncle. Otherwise pertinent FHx was reviewed.     Social History:  reports that she has never smoked. She has never used smokeless tobacco. She reports current drug use. Frequency: 7.00 times per week. Drug: Marijuana. She reports that she does not drink alcohol.    Home Medications:  B-complex with vitamin C, FreeStyle Lisbeth 3 [place], FreeStyle Lisbeth 3 Sensor, FreeStyle Lite, Insulin Aspart FlexPen, Insulin Pen Needle, TiZANidine, acetone (urine) test, amLODIPine, atorvastatin, buPROPion XL, empagliflozin, famotidine, fenofibrate, folic acid, freestyle, glucose blood, glucose monitor, hydrOXYzine, insulin aspart, insulin degludec, linaclotide, losartan, pantoprazole, promethazine, and vitamin D    Allergies:  Allergies   Allergen Reactions    Zofran [Ondansetron] Dizziness       Objective    Objective     Vitals:   Temp:  [98.5 °F (36.9  °C)-99.8 °F (37.7 °C)] 98.5 °F (36.9 °C)  Heart Rate:  [] 85  Resp:  [17-20] 18  BP: (134-176)/(55-92) 150/64    Physical Exam:  Vital Signs Reviewed   Obese, pleasant female, in no acute distress, normal conversational  HEENT:  PERRL, EOMI.  OP, nares clear, no sinus tenderness  Neck:  Supple, no JVD, no thyromegaly  Lymph: no axillary, cervical, supraclavicular lymphadenopathy noted bilaterally  Chest:  good aeration, clear to auscultation bilaterally, tympanic to percussion bilaterally, no work of breathing noted  CV: RRR, no MGR, pulses 2+, equal  Abd:  Soft, NT, ND, + BS, no HSM  EXT:  no clubbing, no cyanosis, no edema, no joint tenderness  Neuro:  A&Ox3, CN grossly intact, no focal deficits  Skin: No rashes or lesions noted      Result Review    Result Review:  I have personally reviewed the results from the time of this admission to 2/3/2025 07:52 EST and agree with these findings:  [x]  Laboratory  [x]  Microbiology  [x]  Radiology  [x]  EKG/Telemetry   [x]  Cardiology/Vascular   []  Pathology  []  Old records  []  Other:  Most notable findings include:         Lab 02/03/25  0815 02/03/25  0348 02/03/25  0020 02/02/25  2041 02/02/25  1445   WBC  --  11.83*  --  13.29* 17.40*   HEMOGLOBIN  --  11.3*  --  13.7 14.5   HEMATOCRIT  --  35.2  --  43.1 44.6   PLATELETS  --  306  --  343 406   SODIUM 138 138 138 140 139   POTASSIUM 3.5 3.8 4.1 4.1 4.5   CHLORIDE 108* 107 106 105 103   CO2 19.7* 19.0* 18.5* 18.9* 16.1*   BUN 24* 27* 29* 29* 34*   CREATININE 1.17* 1.23* 1.24* 1.42* 1.50*   GLUCOSE 124* 200* 210* 196* 250*   CALCIUM 7.8* 7.6* 8.4* 8.9 10.0   PHOSPHORUS 2.3* 2.5 3.0 2.9  --    TOTAL PROTEIN  --   --   --  7.6 8.2   ALBUMIN  --   --   --  4.0 4.2   GLOBULIN  --   --   --  3.6 4.0       XR Chest 1 View    Result Date: 2/2/2025  XR CHEST 1 VW Date of Exam: 2/2/2025 7:47 PM EST Indication: Assess for Fluid Overload Comparison: 12/17/2024 Findings: Cardiac and mediastinal contours are normal.  Pulmonary vascularity is normal. The lungs are clear. No pneumothorax.     Impression: No active disease. Electronically Signed: Brian Blackwood MD  2/2/2025 7:55 PM EST  Workstation ID: [id number]       Assessment & Plan   Assessment / Plan     Active Hospital Problems:  Active Hospital Problems    Diagnosis     **DKA (diabetic ketoacidosis)          Impression:  Euglycemic ketoacidosis  Diabetes melitis, insulin-dependent on insulin  Recently started on Jardiance 6 months ago  Follows endocrinology in [place]  Electrolyte imbalance  Hypomagnesemia  Hypokalemia    Plan:  Currently on insulin drip, switch to basal bolus insulin if stable this morning.  Can discontinue IV fluid exchange.  Has been on insulin and Jardiance at home.  Basal bolus insulin, start Lantus 30 units subcu daily and sliding scale insulin.  Hold Jardiance for now.  Recommend starting Jardiance at low-dose per endocrinology next few weeks.  Replete IV magnesium sulfate.  Replete IV potassium phosphorus.  Advance diet to carb consistent diet today.  Resumed home antihypertensives, statin.    I have reviewed labs, imaging, pertinent clinical data and provider notes.   I have discussed with bedside nurse and primary service. Critical care time spent 32 mins excluding any procedures.   Patient is improving and can likely transfer out off ICU today.     Electronically signed by Alexi Madrid MD, 2/3/2025, 07:52 EST.

## 2025-02-03 NOTE — PLAN OF CARE
Goal Outcome Evaluation:                      Patient is calm and cooperative. She is fully oriented. Able to make needs known. She takes medications whole without difficulty. VSS stable; however, BP at times is HTN.

## 2025-02-03 NOTE — PROGRESS NOTES
[facility]   Hospitalist Progress Note  Date: 2/3/2025  Patient Name: Liyah Butts  : 1980  MRN: [id number]  Date of admission: 2025      Subjective   Subjective     Chief Complaint: Euglycemic ketoacidosis    Summary:  44 y.o. female with history of insulin-dependent diabetes mellitus, presented to the hospital with vomiting, dry heaves and abdominal pain for a day.  She has history of insulin-dependent diabetes and is on Jardiance.  In the ED, patient was found to have pH of 7.5, pCO2 24 and pO2 39 on VBG.  Admitted to the ICU, started on DKA protocol and insulin drip.  Anion gap closed, transition to subcutaneous insulin and transferred out of the ICU on 2/3.  Will need to discontinue Jardiance at discharge.    Interval Followup: No acute events overnight.  States she feels significantly better today than she did yesterday.  Denies abdominal pain or vomiting, tolerating diet currently.     Objective   Objective     Vitals:   Temp:  [98.2 °F (36.8 °C)-99.8 °F (37.7 °C)] 98.2 °F (36.8 °C)  Heart Rate:  [] 91  Resp:  [17-20] 18  BP: (133-177)/() 160/91  Physical Exam    Constitutional: Awake, alert, NAD   Respiratory: Clear to auscultation bilaterally, nonlabored respirations    Cardiovascular: RRR, no MRG   Gastrointestinal: Positive bowel sounds, soft, NTND   Neurologic: Oriented x 3, strength symmetric in all extremities, Cranial Nerves grossly intact to confrontation, speech clear    Result Review    I have personally reviewed the results below:  [x]  Laboratory personally reviewed BMP, CBC, magnesium, phosphorus  []  Microbiology  []  Radiology  []  EKG/Telemetry   []  Cardiology/Vascular   []  Pathology  []  Old records  []  Other:  CBC          2024    19:05 2025    14:45 2025    20:41 2/3/2025    03:48   CBC   WBC 9.05  17.40  13.29  11.83    RBC 4.78  5.34  5.05  4.19    Hemoglobin 13.0  14.5  13.7  11.3    Hematocrit 41.5  44.6  43.1  35.2    MCV 86.8  83.5   85.3  84.0    MCH 27.2  27.2  27.1  27.0    MCHC 31.3  32.5  31.8  32.1    RDW 13.6  13.8  13.8  13.9    Platelets 366  406  343  306      CMP          12/17/2024    19:05 2/2/2025    14:45 2/2/2025    20:41 2/3/2025    00:20 2/3/2025    03:48 2/3/2025    08:15   CMP   Glucose 94  250  196  210  200  124    BUN 23  34  29  29  27  24    Creatinine 1.37  1.50  1.42  1.24  1.23  1.17    EGFR 48.9  43.9  46.9  55.1  55.7  59.1    Sodium 140  139  140  138  138  138    Potassium 3.8  4.5  4.1  4.1  3.8  3.5    Chloride 106  103  105  106  107  108    Calcium 9.6  10.0  8.9  8.4  7.6  7.8    Total Protein 7.7  8.2  7.6       Albumin 4.2  4.2  4.0       Globulin 3.5  4.0  3.6       Total Bilirubin 0.3  0.6  0.4       Alkaline Phosphatase 51  64  55       AST (SGOT) 32  31  33       ALT (SGPT) 26  24  25       Albumin/Globulin Ratio 1.2  1.1  1.1       BUN/Creatinine Ratio 16.8  22.7  20.4  23.4  22.0  20.5    Anion Gap 10.6  19.9  16.1  13.5  12.0  10.3        Assessment & Plan   Assessment / Plan   Euglycemic ketoacidosis  Diabetes melitis, insulin-dependent on insulin  Recently started on Jardiance 6 months ago  Follows endocrinology in [place]  Electrolyte imbalance  Hypomagnesemia  Hypokalemia    Continue to monitor in the hospital for workup and management of the above  Anion gap is closed this morning, will transition to subcutaneous Lantus 30 units daily with sliding scale insulin and transition off of insulin drip  Will hold Jardiance at discharge but patient can follow-up with endocrinology and may restart Jardiance at a lower dose on outpatient basis  Replace IV phosphorus and magnesium  Advance to carb consistent diet  DC IV fluids  Transfer out of the ICU after off of insulin drip  Restart appropriate home medications  Trend renal function and electrolytes with a.m. BMP, magnesium   Trend Hgb and WBC with a.m. CBC     Discussed plan with RN, pulmonology    VTE Prophylaxis:  Mechanical VTE prophylaxis orders  are present.        CODE STATUS:   Level Of Support Discussed With: Patient  Code Status (Patient has no pulse and is not breathing): CPR (Attempt to Resuscitate)  Medical Interventions (Patient has pulse or is breathing): Full Support

## 2025-02-03 NOTE — H&P
[facility]  Date: 2025   Patient Name: Liyah Butts  : 1980  MRN: [id number]  Primary Care Physician:  Mayela Bruce APRN  Date of admission: 2025    Subjective vomiting  Subjective   Chief Complaint: vomiting    HPI: Patient is a 44-year-old female presents with a history of insulin-dependent diabetes who presents to the emergency room for evaluation of vomiting that started this morning. She has been profusely vomiting and dry heaving since this morning. Patient also is experiencing umbilical abdominal pain.  Patient has a history of DKA.  She is also on Jardiance.      Arrival to the ED, patient's temperature 98.6, pulse 97, respiratory 20, blood pressure of 151/92 and she saturating 100% on room air.    On labs, patient's pH is 7.5/24/39/19.3.  Patient's sodium is 143, potassium is 4.1, chloride is 109, glucose is 229, ionized calcium is 1.13, lactate is 2.1.  White blood cell count is 17.40.    CT abdomen pelvis without contrast there are a few mildly dilated small loops which may reflect a mild ileus.  No bowel obstruction.      Personal History     Past Medical History:  Past Medical History:   Diagnosis Date    Anxiety     Anxiety    Brain tumor (benign)     pituitary gland    Brain tumor (benign)     pituatary gland    Cancer     ovarian; history of    Depression     Diabetes mellitus     GERD (gastroesophageal reflux disease)     Gastritis    Headache     Hyperlipidemia     Hypertension 2018    Obesity 1980s    Renal insufficiency     Visual impairment        Past Surgical History:  Past Surgical History:   Procedure Laterality Date    ENDOSCOPY N/A 2023    Procedure: ESOPHAGOGASTRODUODENOSCOPY w/ biopsy;  Surgeon: Alyssa Damon MD;  Location: [facility] ENDOSCOPY;  Service: Gastroenterology;  Laterality: N/A;  gastritis    EYE SURGERY  2022    HYSTERECTOMY      UPPER GASTROINTESTINAL ENDOSCOPY          Family History:   Family History   Problem Relation Age of Onset    Arthritis Mother     Diabetes Mother     Drug abuse Mother     Heart disease Mother     Hyperlipidemia Mother     Kidney disease Paternal Uncle         Has 1 kidney and uses a cath       Social History:   Social History     Socioeconomic History    Marital status:    Tobacco Use    Smoking status: Never    Smokeless tobacco: Never   Vaping Use    Vaping status: Never Used   Substance and Sexual Activity    Alcohol use: No    Drug use: Yes     Frequency: 7.0 times per week     Types: Marijuana     Comment: at night to sleep- delta 8    Sexual activity: Yes     Partners: Female, Male     Birth control/protection: None, Partner of same sex       Home Medications:  B-complex with vitamin C, FreeStyle Lisbeth 3 [place], FreeStyle Lisbeth 3 Sensor, FreeStyle Lite, Insulin Aspart FlexPen, Insulin Pen Needle, TiZANidine, acetone (urine) test, amLODIPine, atorvastatin, buPROPion XL, empagliflozin, famotidine, fenofibrate, folic acid, freestyle, glucose blood, glucose monitor, hydrOXYzine, insulin aspart, insulin degludec, linaclotide, losartan, pantoprazole, promethazine, and vitamin D    Allergies:  Allergies   Allergen Reactions    Zofran [Ondansetron] Dizziness       Review of Systems   All systems were reviewed and negative except for: Profuse vomiting    Objective   Objective     Vitals:   Temp:  [98.6 °F (37 °C)] 98.6 °F (37 °C)  Heart Rate:  [97] 97  Resp:  [20] 20  BP: (151)/(92) 151/92    Physical Exam    Constitutional: Awake, alert, no acute distress   Eyes: Pupils equal, sclerae anicteric, no conjunctival injection   HENT: NCAT, mucous membranes moist   Neck: Supple, no thyromegaly, no lymphadenopathy, trachea midline   Respiratory: Clear to auscultation bilaterally, nonlabored respirations    Cardiovascular: RRR, no murmurs, rubs, or gallops, palpable pedal pulses bilaterally   Gastrointestinal: Positive bowel sounds, soft, nontender,  nondistended   Musculoskeletal: No bilateral ankle edema, no clubbing or cyanosis to extremities   Psychiatric: Appropriate affect, cooperative   Neurologic: Oriented x 3, strength symmetric in all extremities, Cranial Nerves grossly intact to confrontation, speech clear   Skin: No rashes     Result Review    Result Review:  I have personally reviewed the results from the time of this admission to 2/2/2025 19:44 EST and agree with these findings:  [x]  Laboratory  [x]  Microbiology  [x]  Radiology  [x]  EKG/Telemetry   [x]  Cardiology/Vascular   [x]  Pathology  [x]  Old records  []  Other:      Assessment & Plan   Assessment / Plan   #1 euglycemic DKA secondary to Jardiance  #2 Daily marijuana use-as needed Haldol for vomiting if this is cyclic vomiting?  #3 hypertension as needed hydralazine  #4 leukocytosis believed to be reactive  #5 DAGOBERTO probably secondary to dehydration      VTE Prophylaxis:  Mechanical VTE prophylaxis orders are signed & held.          CODE STATUS:    Level Of Support Discussed With: Patient  Code Status (Patient has no pulse and is not breathing): CPR (Attempt to Resuscitate)  Medical Interventions (Patient has pulse or is breathing): Full Support      Admission Status:  I believe this patient meets inpatient status.    Electronically signed by Marcia Rubio DO, 02/02/25, 7:35 PM EST.

## 2025-02-03 NOTE — PAYOR COMM NOTE
"Liyah Butts (44 y.o. Female)       Date of Birth   1980    Social Security Number       Address   [address] [place], [place] [address]    Home Phone   [phone]    MRN   [id number]       Latter day   None    Marital Status                               Admission Date   2/2/25    Admission Type   Emergency    Admitting Provider   Marcia Rubio DO    Attending Provider   Jason Frank MD    Department, Room/Bed   [facility] INTENSIVE CARE UNIT, I09/1       Discharge Date       Discharge Disposition       Discharge Destination                                 Attending Provider: Jason Frank MD    Allergies: Zofran [Ondansetron]    Isolation: None   Infection: None   Code Status: CPR    Ht: 162.6 cm (64\")   Wt: 101 kg (222 lb 10.6 oz)    Admission Cmt: None   Principal Problem: DKA (diabetic ketoacidosis) [E11.10]                   Active Insurance as of 2/2/2025       Primary Coverage       Payor Plan Insurance Group Employer/Plan Group    AETNA MEDICARE REPLACEMENT AETNA MEDICARE REPLACEMENT [id number]       Payor Plan Address Payor Plan Phone Number Payor Plan Fax Number Effective Dates    [address] [phone]  1/1/2025 - None Entered    [facility] [id number]         Subscriber Name Subscriber Birth Date Member ID       LIYAH BUTTS 1980 [id number]                     Emergency Contacts        (Rel.) Home Phone Work Phone Mobile Phone    MARY BUTTS (Spouse) -- -- [phone]             Vomiting [facility] Inpatient Care       Indications Met   Last updated by Alyssa Bai RN on 2/2/2025 2005     Review Status Created By   Primary Completed Alyssa Bai, RENALDO      Criteria Review   Vomiting [facility] Inpatient Care     Overall Determination: Indications Met     Criteria:  [×] Admission is indicated for  1 or more  of the following :      [×] Vomiting due to significant metabolic derangement (eg, severe hypercalcemia, diabetic ketoacidosis) " that is severe or persists despite observation care          2025  8:05 PM              -- 2025  8:05 PM by Alyssa Bai, RN --                  Admitted for DKA. Pt has had severe nonstop N/V and dry heaving essentially since this morning and now has multiple broken blood vessels in her face     Notes:  -- 2025  8:05 PM by Alyssa Bai, RN --      Subject: Admission      To ED c/o non stop N//V since this AM. Now with multiple broken blood vessels in her face 2/2 vomiting.       On exam: Abd w/moderate diffuse tenderness. No distension. AAOx3.       Face w/multiple petechiae from N/V       Pt has T2DM & took last dose of insulin yesterday. Denies any missed doses. She is concerned about DKA.                   PMHx: T2DM, Anxiety, benign pituatary tumor, GERD, HTN, HLD      Pt also reports daily Marijuana use.                   ED results            Glucose 250, BUN 34, Creatinine 1.50, Bicarb 16.1, Anion gap 19. GFR 43      WBC 17.40      pH 7.51, Bicarb 19.3                  Acetone NEG                  CT abd: 1.There are a few mildly dilated small bowel loops which may reflect a mild ileus. No bowel obstruction or definite evidence of enteritis identified. 2.No other acute findings in the abdomen or pelvis.                   In ED:       IV NS 2000ml bolus      DIlaudid IV      Reglan IV                  After IVF, Glucose is 188.                   Admit: Euglycemic DKA 2/2 Jardiance.       Possible Cyclic vomiting 2/2 daily marijuana use.       O2 prn      IVF per DKA protocol      Haldol 2mg IV q6h for N/V      Zofran IV prn      IV insulin drip if needed to keep glucose 120-160.       BMP, Mg+ q4h      Diabetic education                                        History & Physical        Marcia Rubio DO at 25           [facility]  [facility]   Patient Name: Liyah Butts  : 1980  MRN: [id number]  Primary Care Physician:  Teo  DELROY PedroJACQUELINE  Date of admission: 2/2/2025    Subjectivevomiting  Subjective   Chief Complaint: vomiting    HPI: Patient is a 44-year-old female presents with a history of insulin-dependent diabetes who presents to the emergency room for evaluation of vomiting that started this morning. She has been profusely vomiting and dry heaving since this morning. Patient also is experiencing umbilical abdominal pain.  Patient has a history of DKA.  She is also on Jardiance.      Arrival to the ED, patient's temperature 98.6, pulse 97, respiratory 20, blood pressure of 151/92 and she saturating 100% on room air.    On labs, patient's pH is 7.5/24/39/19.3.  Patient's sodium is 143, potassium is 4.1, chloride is 109, glucose is 229, ionized calcium is 1.13, lactate is 2.1.  White blood cell count is 17.40.    CT abdomen pelvis without contrast there are a few mildly dilated small loops which may reflect a mild ileus.  No bowel obstruction.      Personal History     Past Medical History:  Past Medical History:   Diagnosis Date    Anxiety 2021    Anxiety    Brain tumor (benign)     pituitary gland    Brain tumor (benign)     pituatary gland    Cancer     ovarian; history of    Depression 2021    Diabetes mellitus     GERD (gastroesophageal reflux disease) 2023    Gastritis    Headache 2003    Hyperlipidemia 2018    Hypertension 2018    Obesity 1980s    Renal insufficiency 2024    Visual impairment        Past Surgical History:  Past Surgical History:   Procedure Laterality Date    ENDOSCOPY N/A 06/16/2023    Procedure: ESOPHAGOGASTRODUODENOSCOPY w/ biopsy;  Surgeon: Alyssa Damon MD;  Location: [facility] ENDOSCOPY;  Service: Gastroenterology;  Laterality: N/A;  gastritis    EYE SURGERY  Feb 2022    HYSTERECTOMY      UPPER GASTROINTESTINAL ENDOSCOPY         Family History:   Family History   Problem Relation Age of Onset    Arthritis Mother     Diabetes Mother     Drug abuse Mother     Heart disease Mother     Hyperlipidemia  Mother     Kidney disease Paternal Uncle         Has 1 kidney and uses a cath       Social History:   Social History     Socioeconomic History    Marital status:    Tobacco Use    Smoking status: Never    Smokeless tobacco: Never   Vaping Use    Vaping status: Never Used   Substance and Sexual Activity    Alcohol use: No    Drug use: Yes     Frequency: 7.0 times per week     Types: Marijuana     Comment: at night to sleep- delta 8    Sexual activity: Yes     Partners: Female, Male     Birth control/protection: None, Partner of same sex       Home Medications:  B-complex with vitamin C, FreeStyle Lisbeth 3 [place], FreeStyle Lisbeth 3 Sensor, FreeStyle Lite, Insulin Aspart FlexPen, Insulin Pen Needle, TiZANidine, acetone (urine) test, amLODIPine, atorvastatin, buPROPion XL, empagliflozin, famotidine, fenofibrate, folic acid, freestyle, glucose blood, glucose monitor, hydrOXYzine, insulin aspart, insulin degludec, linaclotide, losartan, pantoprazole, promethazine, and vitamin D    Allergies:  Allergies   Allergen Reactions    Zofran [Ondansetron] Dizziness       Review of Systems   All systems were reviewed and negative except for: Profuse vomiting    Objective  Objective     Vitals:   Temp:  [98.6 °F (37 °C)] 98.6 °F (37 °C)  Heart Rate:  [97] 97  Resp:  [20] 20  BP: (151)/(92) 151/92    Physical Exam    Constitutional: Awake, alert, no acute distress   Eyes: Pupils equal, sclerae anicteric, no conjunctival injection   HENT: NCAT, mucous membranes moist   Neck: Supple, no thyromegaly, no lymphadenopathy, trachea midline   Respiratory: Clear to auscultation bilaterally, nonlabored respirations    Cardiovascular: RRR, no murmurs, rubs, or gallops, palpable pedal pulses bilaterally   Gastrointestinal: Positive bowel sounds, soft, nontender, nondistended   Musculoskeletal: No bilateral ankle edema, no clubbing or cyanosis to extremities   Psychiatric: Appropriate affect, cooperative   Neurologic: Oriented x 3, strength  symmetric in all extremities, Cranial Nerves grossly intact to confrontation, speech clear   Skin: No rashes     Result Review   Result Review:  I have personally reviewed the results from the time of this admission to 2/2/2025 19:44 EST and agree with these findings:  [x]  Laboratory  [x]  Microbiology  [x]  Radiology  [x]  EKG/Telemetry   [x]  Cardiology/Vascular   [x]  Pathology  [x]  Old records  []  Other:      Assessment & Plan  Assessment / Plan   #1 euglycemic DKA secondary to Jardiance  #2 Daily marijuana use-as needed Haldol for vomiting if this is cyclic vomiting?  #3 hypertension as needed hydralazine  #4 leukocytosis believed to be reactive  #5 DAGOBERTO probably secondary to dehydration      VTE Prophylaxis:  Mechanical VTE prophylaxis orders are signed & held.          CODE STATUS:    Level Of Support Discussed With: Patient  Code Status (Patient has no pulse and is not breathing): CPR (Attempt to Resuscitate)  Medical Interventions (Patient has pulse or is breathing): Full Support      Admission Status:  I believe this patient meets inpatient status.    Electronically signed by Marcia Rubio DO, 02/02/25, 7:35 PM EST.             Electronically signed by Marcia Rubio DO at 02/02/25 1945          Emergency Department Notes        Mann Ríos MD at 02/02/25 1650          Time: 4:50 PM EST  Date of encounter:  2/2/2025  Independent Historian/Clinical History and Information was obtained by:   Patient    History is limited by: N/A    Chief Complaint: vomiting       History of Present Illness:  Patient is a 44 y.o. year old female with history of insulin-dependent diabetes, who presents to the emergency department for evaluation of vomiting that started this morning.  Patient also has complaints of periumbilical abdominal pain.  Has a history of type 2 diabetes and last took her insulin yesterday.  Has a history of DKA.    Denies any sick contacts.  Denies any missed doses of insulin.  No fevers.  No  dysuria.    She has had severe nonstop nausea and vomiting and dry heaving essentially since this morning and now has multiple broken blood vessels in her face, which is typical for her from vomiting.    Patient Care Team  Primary Care Provider: Mayela Bruce APRN    Past Medical History:     Allergies   Allergen Reactions    Zofran [Ondansetron] Dizziness     Past Medical History:   Diagnosis Date    Anxiety 2021    Anxiety    Brain tumor (benign)     pituitary gland    Brain tumor (benign)     pituatary gland    Cancer     ovarian; history of    Depression 2021    Diabetes mellitus     GERD (gastroesophageal reflux disease) 2023    Gastritis    Headache 2003    Hyperlipidemia 2018    Hypertension 2018    Obesity 1980s    Renal insufficiency 2024    Visual impairment      Past Surgical History:   Procedure Laterality Date    ENDOSCOPY N/A 06/16/2023    Procedure: ESOPHAGOGASTRODUODENOSCOPY w/ biopsy;  Surgeon: Alyssa Damon MD;  Location: [facility] ENDOSCOPY;  Service: Gastroenterology;  Laterality: N/A;  gastritis    EYE SURGERY  Feb 2022    HYSTERECTOMY      UPPER GASTROINTESTINAL ENDOSCOPY       Family History   Problem Relation Age of Onset    Arthritis Mother     Diabetes Mother     Drug abuse Mother     Heart disease Mother     Hyperlipidemia Mother     Kidney disease Paternal Uncle         Has 1 kidney and uses a cath       Home Medications:  Prior to Admission medications    Medication Sig Start Date End Date Taking? Authorizing Provider   Acetone, Urine, Test (Ketone Test) strip 1 strip if needed for high blood sugar (illness, vomiting). Call clinic if positive. 7/16/24      amLODIPine (NORVASC) 5 MG tablet Take 1 tablet by mouth Daily. 12/17/24   Ai Shipley MD   atorvastatin (LIPITOR) 40 MG tablet Take 1 tablet by mouth every night at bedtime. 1/21/25   Mayela Bruce APRN   B Complex-C (B-complex with vitamin C) tablet Take 1 tablet by mouth Daily. 1/21/25 1/21/26  Teo  SHAE Pedro   Blood Glucose Monitoring Suppl (ONE TOUCH ULTRA MINI) w/Device kit 1 each by Other route 3 (Three) Times a Day Before Meals. 5/1/24   Mayela Bruce APRN   buPROPion XL (Wellbutrin XL) 300 MG 24 hr tablet Take 1 tablet by mouth Every Morning. 1/21/25   Mayela Bruce APRN   Continuous Glucose  (FreeStyle Lisbeth 3 [place]) device Use 1 each Every 14 (Fourteen) Days. 6/17/24   Mayela Bruce APRN   Continuous Glucose Sensor (FreeStyle Lisbeth 3 Sensor) misc Use 1 each Every 14 (Fourteen) Days. 7/29/24   Mayela Bruce APRN   famotidine (PEPCID) 20 MG tablet Take 1 tablet by mouth every night at bedtime. 11/18/24   Yvonne Sigala APRN   famotidine (PEPCID) 40 MG tablet Take 1 tablet by mouth 2 (Two) Times a Day. 1/21/25   Yvonne Sigala APRN   fenofibrate (TRICOR) 145 MG tablet Take 1 tablet by mouth Daily. 1/21/25   Mayela Bruce APRN   folic acid (FOLVITE) 1 MG tablet Take 1 tablet by mouth Daily. 1/21/25   Mayela Bruce APRN   glucose blood test strip 1 each by Other route 3 (Three) Times a Day Before Meals. Use as instructed 6/16/23   Jaciel Ponce DO   glucose monitor monitoring kit Use 1 each 3 (Three) Times a Day Before Meals. Pharmacist Choice Use as directed to check blood sugars TID DX: E11.9 1/21/25   Mayela Bruce APRN   hydrOXYzine (ATARAX) 25 MG tablet Take 1 tablet by mouth Every 6 (Six) Hours As Needed for Itching. 1/21/25   Mayela Bruce APRN   insulin aspart (NovoLOG FlexPen) 100 UNIT/ML solution pen-injector sc pen Inject 3 units before meals plus 1:50>150 as needed. MDD: 30 units 1/21/25   Mayela Bruce APRN   Insulin Aspart FlexPen 100 UNIT/ML solution pen-injector Inject 4 units before meals plus 1:50>150 as needed. MDD: 30 units 1/21/25   Mayela Bruce APRN   insulin degludec (Tresiba FlexTouch) 100 UNIT/ML solution pen-injector injection Inject 30 Units under the skin into the appropriate area as directed Every Night.  1/21/25   Mayela Bruce APRN   insulin degludec (Tresiba FlexTouch) 100 UNIT/ML solution pen-injector injection Inject 32 Units under the skin into the appropriate area as directed Every Morning.Titrated as directed. MDD: 60 units 1/21/25   Mayela Bruce APRN   Insulin Pen Needle (BD Pen Needle Imelda 2nd Gen) 32G X 4 MM misc Use 1 each Daily as directed. 10/1/24   Mayela Bruce APRN   Insulin Pen Needle (BD Pen Needle Imelda 2nd Gen) 32G X 4 MM misc Inject Insulin 4 times a day 1/21/25   Mayela Bruce APRN   Jardiance 25 MG tablet tablet Take 1 tablet by mouth Daily. 1/21/25 1/21/26  Mayela Bruce APRN   linaclotide (Linzess) 145 MCG capsule capsule Take 1 capsule by mouth Every Morning Before Breakfast. 6/3/24   Yvonne Sigala APRN   losartan (Cozaar) 100 MG tablet Take 1 tablet by mouth Daily. 1/21/25   Mayela Bruce APRN   OneTouch Delica Lancets 33G misc 1 each by Other route 3 (Three) Times a Day Before Meals. 6/16/23   Jaciel Ponce DO   pantoprazole (PROTONIX) 40 MG EC tablet Take 1 tablet by mouth 2 (Two) Times a Day Before Meals. 1/21/25   Yvonne Sigala APRN   promethazine (PHENERGAN) 25 MG tablet Take 1 tablet by mouth Every 6 (Six) Hours As Needed for Nausea or Vomiting. 6/3/24   Yvonne Sigala APRN   TiZANidine (ZANAFLEX) 2 MG capsule Take 1 capsule by mouth 3 (Three) Times a Day As Needed for Muscle Spasms. 1/21/25   Mayela Bruce APRN   vitamin D (ERGOCALCIFEROL) 1.25 MG (99202 UT) capsule capsule Take 1 capsule by mouth 1 (One) Time Per Week. Take with a fatty meal 6/3/24   Mayela Bruce APRN        Social History:   Social History     Tobacco Use    Smoking status: Never    Smokeless tobacco: Never   Vaping Use    Vaping status: Never Used   Substance Use Topics    Alcohol use: No    Drug use: Yes     Frequency: 7.0 times per week     Types: Marijuana     Comment: at night to sleep- delta 8         Review of Systems:  Review of Systems  "  Gastrointestinal:  Positive for abdominal pain, nausea and vomiting.        Physical Exam:  /92   Pulse 97   Temp 98.6 °F (37 °C)   Resp 20   Ht 162.6 cm (64\")   Wt 97.8 kg (215 lb 9.8 oz)   SpO2 100%   BMI 37.01 kg/m²         General: Awake alert and in moderate to severe distress, actively vomiting in the emesis bag    HEENT: Head normocephalic atraumatic, eyes PERRLA EOMI, nose normal, oropharynx normal.  Mucous membranes of dry, dehydrated.  Face has multiple petechiae present from vomiting    Neck: Supple full range of motion, no meningismus, no lymphadenopathy    Heart: Mildly tachycardic with regular rhythm, no murmurs or rubs, 2+ radial pulses bilaterally    Lungs: Clear to auscultation bilaterally without wheezes or crackles, no respiratory distress    Abdomen: Soft, moderately tender diffusely mostly in the periumbilical area, obese, nondistended, no rebound or guarding    Skin: Warm, dry, no rash    Musculoskeletal: Normal range of motion, no lower extremity edema    Neurologic: Oriented x3, no motor deficits no sensory deficits    Psychiatric: Mood appears stable, no psychosis                  Medical Decision Making:      Comorbidities that affect care:    Chronic Kidney Disease, Diabetes    External Notes reviewed:    Previous Labs: I compared today's lab work to her baseline labs and it looks like a creatinine of 1.3 at baseline has slightly worsened today to 1.5 in the setting of vomiting.      The following orders were placed and all results were independently analyzed by me:  Orders Placed This Encounter   Procedures    CT Abdomen Pelvis Without Contrast    XR Chest 1 View    [place] Draw    Comprehensive Metabolic Panel    Lipase    Urinalysis With Microscopic If Indicated (No Culture) - Urine, Clean Catch    CBC Auto Differential    Blood Gas, Venous -    Acetone    Lactic Acid, Plasma    Venous Blood Gas,Lactate    Blood Gas, Venous -    STAT Lactic Acid, Reflex    Comprehensive " Metabolic Panel    Phosphorus    Magnesium    Osmolality, Serum    Hemoglobin A1c    High Sensitivity Troponin T    Basic Metabolic Panel    Magnesium    Phosphorus    CBC Auto Differential    NPO Diet NPO Type: Strict NPO    Undress & Gown    Vital Signs    Strict Intake & Output    Daily Weights    Continuous Pulse Oximetry    Saline Lock & Maintain IV Access    Corrected Serum Sodium = Measured Sodium + [1.6 x ((Glucose - 100)/100)]    Do NOT Discontinue Insulin Infusion Until 2 Hours After First Dose of Basal SQ Insulin    Prior to Initiating Glucommander™, Ensure All Prior Insulin Orders Are Discontinued    Do Not Start Insulin Infusion if Potassium < 3.3    Use a Dedicated Line for Insulin Infusion (If Possible).  May Use a Carrier Fluid of NS at KVO Rate if Insulin Rate is Insufficient to Maintain IV Patency.  Prime IV Line With Insulin Infusion    Glucommander Must Be Discontinued if Insulin Infusion is Discontinued.  If Insulin Infusion is Restarted, Previous Glucommander Settings Must Be Discontinued and Re-Entered From New Order    Once DKA Meets Resolution Criteria - Call Provider for Transition Orders From IV to SQ Insulin    Utilize the Start Meal Feature / Meal Bolus Feature in Glucommander if Patient Starts a Diet or Bolus Tube Feedings    Notify Provider - Insulin Infusion    RN to Release PRN POC Glucose Orders Per Glucommander    RN to Order STAT Glucose For Any POC Glucose <10 or >600    If Insulin Infusion is Paused - Follow Glucommander Instructions    DKA / HHS Patients - Phosphorus of 1 mg/dL or Higher Does NOT Require Replacement (While Insulin Infusing)    Hospitalist (on-call MD unless specified)    Inpatient Diabetes Educator Consult    Patient is on Glucommander    Oxygen Therapy- Nasal Cannula; Titrate 1-6 LPM Per SpO2; 90 - 95%    POC Glucose STAT    POC Lactate    POC Electrolyte Panel    POC Glucose Once    POC Glucose PRN    ECG 12 Lead Electrolyte Imbalance    Insert Peripheral IV     Insert Peripheral IV x2    CBC & Differential    Green Top (Gel)    Lavender Top    Gold Top - SST    Light Blue Top    CBC & Differential       Medications Given in the Emergency Department:  Medications   sodium chloride 0.9 % flush 10 mL (has no administration in time range)   sodium chloride 0.9 % flush 10 mL (has no administration in time range)   sodium chloride 0.9 % flush 10 mL (has no administration in time range)   sodium chloride 0.9 % infusion 40 mL (has no administration in time range)   dextrose (GLUTOSE) oral gel 15 g (has no administration in time range)   dextrose (D50W) (25 g/50 mL) IV injection 10-50 mL (has no administration in time range)   glucagon (GLUCAGEN) injection 1 mg (has no administration in time range)   sodium chloride 0.9 % bolus (has no administration in time range)   sodium chloride 0.9 % infusion (has no administration in time range)   sodium chloride 0.9 % with KCl 20 mEq/L infusion (has no administration in time range)   sodium chloride 0.9 % with KCl 40 mEq/L infusion (has no administration in time range)   dextrose 5 % and sodium chloride 0.9 % infusion (has no administration in time range)   dextrose 5 % and sodium chloride 0.9 % with KCl 20 mEq/L infusion (has no administration in time range)   dextrose 5 % and sodium chloride 0.9 % with KCl 40 mEq/L infusion (has no administration in time range)   sodium chloride 0.45 % infusion (has no administration in time range)   sodium chloride 0.45 % with KCl 20 mEq/L infusion (has no administration in time range)   sodium chloride 0.45 % 1,000 mL with potassium chloride 40 mEq infusion (has no administration in time range)   dextrose 5 % and sodium chloride 0.45 % infusion (has no administration in time range)   dextrose 5 % and sodium chloride 0.45 % with KCl 20 mEq/L infusion (has no administration in time range)   dextrose 5 % and sodium chloride 0.45 % with KCl 40 mEq/L infusion (has no administration in time range)   insulin  regular 1 unit/mL in 0.9% sodium chloride (Glucommander) (has no administration in time range)   Potassium Replacement - Follow Nurse / BPA Driven Protocol (has no administration in time range)   Magnesium Standard Dose Replacement - Follow Nurse / BPA Driven Protocol (has no administration in time range)   Phosphorus Replacement - Follow Nurse / BPA Driven Protocol (has no administration in time range)   Calcium Replacement - Follow Nurse / BPA Driven Protocol (has no administration in time range)   sodium chloride 0.9 % bolus 1,000 mL (0 mL Intravenous Stopped 2/2/25 1915)   HYDROmorphone (DILAUDID) injection 1 mg (1 mg Intravenous Given 2/2/25 1748)   metoclopramide (REGLAN) injection 10 mg (10 mg Intravenous Given 2/2/25 1752)   sodium chloride 0.9 % bolus 1,000 mL (0 mL Intravenous Stopped 2/2/25 1915)        ED Course:         Labs:    Lab Results (last 24 hours)       Procedure Component Value Units Date/Time    CBC & Differential [[id number]]  (Abnormal) Collected: 02/02/25 1445    Specimen: Blood from Arm, Right Updated: 02/02/25 1458    Narrative:      The following orders were created for panel order CBC & Differential.  Procedure                               Abnormality         Status                     ---------                               -----------         ------                     CBC Auto Differential[[id number]]        Abnormal            Final result                 Please view results for these tests on the individual orders.    Comprehensive Metabolic Panel [[id number]]  (Abnormal) Collected: 02/02/25 1445    Specimen: Blood from Arm, Right Updated: 02/02/25 0977     Glucose 250 mg/dL      BUN 34 mg/dL      Creatinine 1.50 mg/dL      Sodium 139 mmol/L      Potassium 4.5 mmol/L      Chloride 103 mmol/L      CO2 16.1 mmol/L      Calcium 10.0 mg/dL      Total Protein 8.2 g/dL      Albumin 4.2 g/dL      ALT (SGPT) 24 U/L      AST (SGOT) 31 U/L      Alkaline Phosphatase 64 U/L      Total Bilirubin  0.6 mg/dL      Globulin 4.0 gm/dL      A/G Ratio 1.1 g/dL      BUN/Creatinine Ratio 22.7     Anion Gap 19.9 mmol/L      eGFR 43.9 mL/min/1.73     Narrative:      GFR Categories in Chronic Kidney Disease (CKD)      GFR Category          GFR (mL/min/1.73)    Interpretation  G1                     90 or greater         Normal or high (1)  G2                      60-89                Mild decrease (1)  G3a                   45-59                Mild to moderate decrease  G3b                   30-44                Moderate to severe decrease  G4                    15-29                Severe decrease  G5                    14 or less           Kidney failure          (1)In the absence of evidence of kidney disease, neither GFR category G1 or G2 fulfill the criteria for CKD.    eGFR calculation 2021 CKD-EPI creatinine equation, which does not include race as a factor    Lipase [[id number]]  (Normal) Collected: 02/02/25 1445    Specimen: Blood from Arm, Right Updated: 02/02/25 1554     Lipase 33 U/L     CBC Auto Differential [[id number]]  (Abnormal) Collected: 02/02/25 1445    Specimen: Blood from Arm, Right Updated: 02/02/25 1458     WBC 17.40 10*3/mm3      RBC 5.34 10*6/mm3      Hemoglobin 14.5 g/dL      Hematocrit 44.6 %      MCV 83.5 fL      MCH 27.2 pg      MCHC 32.5 g/dL      RDW 13.8 %      RDW-SD 41.9 fl      MPV 9.6 fL      Platelets 406 10*3/mm3      Neutrophil % 92.2 %      Lymphocyte % 2.6 %      Monocyte % 3.4 %      Eosinophil % 0.9 %      Basophil % 0.4 %      Immature Grans % 0.5 %      Neutrophils, Absolute 16.05 10*3/mm3      Lymphocytes, Absolute 0.45 10*3/mm3      Monocytes, Absolute 0.59 10*3/mm3      Eosinophils, Absolute 0.16 10*3/mm3      Basophils, Absolute 0.07 10*3/mm3      Immature Grans, Absolute 0.08 10*3/mm3      nRBC 0.0 /100 WBC     Acetone [[id number]]  (Normal) Collected: 02/02/25 1445    Specimen: Blood from Arm, Right Updated: 02/02/25 1744     Acetone Negative    Lactic Acid, Plasma  [[id number]]  (Normal) Collected: 02/02/25 1745    Specimen: Blood Updated: 02/02/25 1812     Lactate 2.0 mmol/L     POC Glucose STAT [[id number]]  (Abnormal) Collected: 02/02/25 1752    Specimen: Blood Updated: 02/02/25 1756     Glucose 229 mg/dL      Comment: Serial Number: [id number]:  [id number]       Blood Gas, Venous - [[id number]]  (Abnormal) Collected: 02/02/25 1752    Specimen: Venous Blood Updated: 02/02/25 1756     pH, Venous 7.514 pH Units      pCO2, Venous 24.0 mm Hg      pO2, Venous 39.4 mm Hg      HCO3, Venous 19.3 mmol/L      Base Excess, Venous -1.5 mmol/L      Comment: Serial Number: [id number]:  [id number]        O2 Saturation, Venous 81.1 %      Hemoglobin, Blood Gas 16.1 g/dL      Barometric Pressure for Blood Gas 744.3000 mmHg      Modality Room Air     Notified Who Syers    POC Lactate [[id number]]  (Abnormal) Collected: 02/02/25 1752    Specimen: Venous Blood Updated: 02/02/25 1756     Lactate 2.1 mmol/L      Comment: Serial Number: [id number]:  [id number]       POC Electrolyte Panel [[id number]]  (Abnormal) Collected: 02/02/25 1752    Specimen: Venous Blood Updated: 02/02/25 1756     Sodium 143 mmol/L      POC Potassium 4.1 mmol/L      Chloride 109 mmol/L      Ionized Calcium 1.13 mmol/L      Comment: Serial Number: [id number]:  [id number]       POC Glucose Once [[id number]]  (Abnormal) Collected: 02/02/25 1911    Specimen: Blood Updated: 02/02/25 1913     Glucose 188 mg/dL      Comment: Serial Number: [id number]:  [id number]                Imaging:    CT Abdomen Pelvis Without Contrast    Result Date: 2/2/2025  CT ABDOMEN PELVIS WO CONTRAST Date of Exam: 2/2/2025 6:37 PM EST Indication: Generalized abdominal pain, vomiting. Comparison: 6/13/2023 Technique: Axial CT images were obtained of the abdomen and pelvis without the administration of contrast. Reconstructed coronal and sagittal images were also obtained. Automated exposure control and iterative construction methods were used. Findings:  There is some minimal atelectasis in the lower lobes. There is atherosclerotic disease with no aortic aneurysm. Gallbladder unremarkable. No biliary obstruction. No renal or ureteral stones. No hydronephrosis. The unenhanced solid abdominal organs are normal. Urinary bladder is normal. Uterus is surgically absent. The appendix is normal. Large bowel is normal with no evidence of colitis. There are a few mildly dilated small bowel loops which may reflect a mild ileus. No bowel obstruction or definite evidence of enteritis identified. Stomach is normal. No adenopathy or free fluid.     1.There are a few mildly dilated small bowel loops which may reflect a mild ileus. No bowel obstruction or definite evidence of enteritis identified. 2.No other acute findings in the abdomen or pelvis. Electronically Signed: Brian Blackwood MD  2/2/2025 7:15 PM EST  Workstation ID: [id number]       Differential Diagnosis and Discussion:    Abdominal Pain: Based on the patient's signs and symptoms, I considered abdominal aortic aneurysm, small bowel obstruction, pancreatitis, acute cholecystitis, acute appendecitis, peptic ulcer disease, gastritis, colitis, endocrine disorders, irritable bowel syndrome and other differential diagnosis an etiology of the patient's abdominal pain.  Vomiting: Differential diagnosis includes but is not limited to migraine, labyrinthine disorders, psychogenic, metabolic and endocrine causes, peptic ulcer, gastric outlet obstruction, gastritis, gastroenteritis, appendicitis, intestinal obstruction, paralytic ileus, food poisoning, cholecystitis, acute hepatitis, acute pancreatitis, acute febrile illness, and myocardial infarction.    PROCEDURES:    Labs were collected in the emergency department and all labs were reviewed and interpreted by me.  CT scan was performed in the emergency department and the CT scan radiology impression was interpreted by me.    ECG 12 Lead Electrolyte Imbalance    (Results Pending)        Procedures    MDM     Amount and/or Complexity of Data Reviewed  Clinical lab tests: reviewed  Tests in the radiology section of CPT®: reviewed  Decide to obtain previous medical records or to obtain history from someone other than the patient: yes           This patient is a 44-year-old female with insulin-dependent diabetes presenting with severe nausea and vomiting, generalized abdominal pain.    Lab work concerning for DKA with blood sugar of 250, low serum bicarbonate of and widened anion gap, and I will check ketones and also a blood gas.    I am hydrating her with IV fluids and giving her antiemetics and pain medicine for symptom relief and we will start insulin once her potassium has resulted.    CT scan came back negative for any acute surgical process but shows ileus.    Recheck blood sugar after fluids is 188, but we will start insulin drip for euglycemic DKA management, given patient's use of Jardiance for diabetes.    Will proceed with hospitalization for DKA management.        Critical care:  Total Critical Care time of 35 minutes. Total critical care time documented does not include time spent on separately billed procedures for services of nurses or physician assistants. I personally saw and examined the patient. I have reviewed all diagnostic interpretations and treatment plans as written. I was present for the key portions of any procedures performed and the inclusive time noted in any critical care statement. Critical care time includes patient management by me, time spent at the patients bedside,  time to review lab and imaging results, discussing patient care, documentation in the medical record, and time spent with family or caregiver.          Patient Care Considerations:          Consultants/Shared Management Plan:    Hospitalist: I have discussed the case with the admitting hospitalist who agrees to accept the patient for admission.    Social Determinants of Health:    Patient is  independent, reliable, and has access to care.       Disposition and Care Coordination:    Admit:   Through independent evaluation of the patient's history, physical, and imperical data, the patient meets criteria for inpatient admission to the hospital.        Final diagnoses:   Diabetic ketoacidosis without coma associated with type 2 diabetes mellitus   Nausea and vomiting, unspecified vomiting type   Acute generalized abdominal pain   Ileus        ED Disposition       ED Disposition   Decision to Admit    Condition   --    Comment   --               This medical record created using voice recognition software.             Mann Ríos MD  02/02/25 1931      Electronically signed by Mann Ríos MD at 02/02/25 1931       Physician Progress Notes (last 24 hours)  Notes from 02/02/25 0926 through 02/03/25 0926   No notes of this type exist for this encounter.       Consult Notes (last 24 hours)  Notes from 02/02/25 0926 through 02/03/25 0926   No notes of this type exist for this encounter.     [facility] ,  Ph [phone]-  F [phone]

## 2025-02-03 NOTE — CASE MANAGEMENT/SOCIAL WORK
Discharge Planning Assessment   Laila     Patient Name: Liyah Butts  MRN: [id number]  Today's Date: 2/2/2025    Admit Date: 2/2/2025        Discharge Needs Assessment       Row Name 02/02/25 1949       Living Environment    People in Home child(clemente), dependent;spouse;other relative(s)    Unique Family Situation stepson and father in law reside in the home    Current Living Arrangements home    Potentially Unsafe Housing Conditions none    In the past 12 months has the electric, gas, oil, or water company threatened to shut off services in your home? No    Primary Care Provided by spouse/significant other    Provides Primary Care For no one, unable/limited ability to care for self    Family Caregiver if Needed spouse    Quality of Family Relationships supportive    Able to Return to Prior Arrangements yes       Resource/Environmental Concerns    Resource/Environmental Concerns none    Transportation Concerns none       Transportation Needs    In the past 12 months, has lack of transportation kept you from medical appointments or from getting medications? yes    In the past 12 months, has lack of transportation kept you from meetings, work, or from getting things needed for daily living? Yes       Food Insecurity    Within the past 12 months, you worried that your food would run out before you got the money to buy more. Never true    Within the past 12 months, the food you bought just didn't last and you didn't have money to get more. Never true       Transition Planning    Patient/Family Anticipates Transition to home with family    Patient/Family Anticipated Services at Transition none    Transportation Anticipated family or friend will provide       Discharge Needs Assessment    Readmission Within the Last 30 Days no previous admission in last 30 days    Equipment Currently Used at Home glucometer    Concerns to be Addressed other (see comments)  concerns with transportation    Do you want help finding or  keeping work or a job? I do not need or want help    Do you want help with school or training? For example, starting or completing job training or getting a high school diploma, GED or equivalent No    Anticipated Changes Related to Illness none    Equipment Needed After Discharge none                   Discharge Plan    No documentation.                 Continued Care and Services - Admitted Since 2/2/2025    No active coordination exists for this encounter.          Demographic Summary       Row Name 02/02/25 1948       General Information    Admission Type inpatient    Arrived From home    Referral Source emergency department    Reason for Consult discharge planning    Preferred Language English                   Functional Status       Row Name 02/02/25 1948       Functional Status    Usual Activity Tolerance moderate    Current Activity Tolerance moderate       Physical Activity    On average, how many days per week do you engage in moderate to strenuous exercise (like a brisk walk)? 0 days    On average, how many minutes do you engage in exercise at this level? 0 min    Number of minutes of exercise per week 0       Assessment of Health Literacy    How often do you have someone help you read hospital materials? Always    How often do you have problems learning about your medical condition because of difficulty understanding written information? Always    How often do you have a problem understanding what is told to you about your medical condition? Always    How confident are you filling out medical forms by yourself? Not at all    Health Literacy Fair       Functional Status, IADL    Medications completely dependent    Meal Preparation completely dependent    Housekeeping completely dependent    Laundry completely dependent    Shopping completely dependent    If for any reason you need help with day-to-day activities such as bathing, preparing meals, shopping, managing finances, etc., do you get the help you  need? I get all the help I need       Mental Status    General Appearance WDL WDL       Mental Status Summary    Recent Changes in Mental Status/Cognitive Functioning no changes                   Psychosocial    No documentation.                  Abuse/Neglect       Row Name 02/02/25 1948       Personal Safety    Feels Unsafe at Home or Work/School no    Feels Threatened by Someone no    Does Anyone Try to Keep You From Having Contact with Others or Doing Things Outside Your Home? no    Physical Signs of Abuse Present no                   Legal       Row Name 02/02/25 1949       Financial Resource Strain    How hard is it for you to pay for the very basics like food, housing, medical care, and heating? Not very                   Substance Abuse       Row Name 02/02/25 1949       Substance Use    Substance Use Status past street drug/inhalant/medication abuse    Last Street Drug/Medication/Inhalant Use --  over a year ago; does use [employer] nightly for sleeping                   Patient Forms    No documentation.                 SW met with pt at bedside this date to complete discharge needs assessment. Pt reports that she is legally blind and her  is her caretaker. She reports that she resides at home with her , wyane and her father in law. She denied any specific discharge concerns at this time. She does report concerns with transportation due to not being able to drive and having to rely on her . SW discussed Medicaid assisted transport with pt and she is interested in obtaining more information at this time. Pt does report feeling isolated/lonely most of the time. She does endorse depressive feelings as well that are monitored by her PCP but she would like further referrals for outpatient psychiatry/therapy. No other concerns identified at this time. Pt sees SHAE Mcgee as her PCP and prefers [facility] for her pharmacy needs.     COMFORT Maria

## 2025-02-03 NOTE — SIGNIFICANT NOTE
02/03/25 1058   Coping/Psychosocial   Observed Emotional State calm;cooperative   Verbalized Emotional State anxiety   Trust Relationship/Rapport empathic listening provided   Involvement in Care interacting with patient   Additional Documentation Spiritual Care (Group)   Spiritual Care   Use of Spiritual Resources prayer   Spiritual Care Source  initiative   Spiritual Care Follow-Up follow-up, none required as presently assessed   Response to Spiritual Care receptive of support;engaged in conversation;thanks expressed   Spiritual Care Interventions supportive conversation provided;prayer support provided   Spiritual Care Visit Type initial   Receptivity to Spiritual Care visit welcomed     Duration of visit: 12 min

## 2025-02-04 ENCOUNTER — READMISSION MANAGEMENT (OUTPATIENT)
Dept: CALL CENTER | Facility: HOSPITAL | Age: 45
End: 2025-02-04
Payer: MEDICARE

## 2025-02-04 VITALS
DIASTOLIC BLOOD PRESSURE: 50 MMHG | HEIGHT: 64 IN | HEART RATE: 100 BPM | WEIGHT: 210.1 LBS | SYSTOLIC BLOOD PRESSURE: 137 MMHG | BODY MASS INDEX: 35.87 KG/M2 | OXYGEN SATURATION: 99 % | RESPIRATION RATE: 20 BRPM | TEMPERATURE: 97.3 F

## 2025-02-04 LAB
ALBUMIN SERPL-MCNC: 3.6 G/DL (ref 3.5–5.2)
ANION GAP SERPL CALCULATED.3IONS-SCNC: 19.2 MMOL/L (ref 5–15)
BASOPHILS # BLD AUTO: 0.06 10*3/MM3 (ref 0–0.2)
BASOPHILS NFR BLD AUTO: 0.5 % (ref 0–1.5)
BUN SERPL-MCNC: 19 MG/DL (ref 6–20)
BUN/CREAT SERPL: 16.5 (ref 7–25)
CALCIUM SPEC-SCNC: 8.4 MG/DL (ref 8.6–10.5)
CHLORIDE SERPL-SCNC: 103 MMOL/L (ref 98–107)
CO2 SERPL-SCNC: 14.8 MMOL/L (ref 22–29)
CREAT SERPL-MCNC: 1.15 MG/DL (ref 0.57–1)
DEPRECATED RDW RBC AUTO: 44.1 FL (ref 37–54)
EGFRCR SERPLBLD CKD-EPI 2021: 60.4 ML/MIN/1.73
EOSINOPHIL # BLD AUTO: 0.05 10*3/MM3 (ref 0–0.4)
EOSINOPHIL NFR BLD AUTO: 0.4 % (ref 0.3–6.2)
ERYTHROCYTE [DISTWIDTH] IN BLOOD BY AUTOMATED COUNT: 13.7 % (ref 12.3–15.4)
GLUCOSE BLDC GLUCOMTR-MCNC: 144 MG/DL (ref 70–99)
GLUCOSE BLDC GLUCOMTR-MCNC: 176 MG/DL (ref 70–99)
GLUCOSE SERPL-MCNC: 200 MG/DL (ref 65–99)
HCT VFR BLD AUTO: 41.2 % (ref 34–46.6)
HGB BLD-MCNC: 12.6 G/DL (ref 12–15.9)
IMM GRANULOCYTES # BLD AUTO: 0.05 10*3/MM3 (ref 0–0.05)
IMM GRANULOCYTES NFR BLD AUTO: 0.4 % (ref 0–0.5)
LYMPHOCYTES # BLD AUTO: 2.12 10*3/MM3 (ref 0.7–3.1)
LYMPHOCYTES NFR BLD AUTO: 16.9 % (ref 19.6–45.3)
MAGNESIUM SERPL-MCNC: 2.1 MG/DL (ref 1.6–2.6)
MCH RBC QN AUTO: 26.8 PG (ref 26.6–33)
MCHC RBC AUTO-ENTMCNC: 30.6 G/DL (ref 31.5–35.7)
MCV RBC AUTO: 87.7 FL (ref 79–97)
MONOCYTES # BLD AUTO: 0.97 10*3/MM3 (ref 0.1–0.9)
MONOCYTES NFR BLD AUTO: 7.7 % (ref 5–12)
NEUTROPHILS NFR BLD AUTO: 74.1 % (ref 42.7–76)
NEUTROPHILS NFR BLD AUTO: 9.33 10*3/MM3 (ref 1.7–7)
NRBC BLD AUTO-RTO: 0 /100 WBC (ref 0–0.2)
PHOSPHATE SERPL-MCNC: 2 MG/DL (ref 2.5–4.5)
PLATELET # BLD AUTO: 352 10*3/MM3 (ref 140–450)
PMV BLD AUTO: 9.9 FL (ref 6–12)
POTASSIUM SERPL-SCNC: 3.9 MMOL/L (ref 3.5–5.2)
RBC # BLD AUTO: 4.7 10*6/MM3 (ref 3.77–5.28)
SODIUM SERPL-SCNC: 137 MMOL/L (ref 136–145)
WBC NRBC COR # BLD AUTO: 12.58 10*3/MM3 (ref 3.4–10.8)

## 2025-02-04 PROCEDURE — 63710000001 INSULIN LISPRO (HUMAN) PER 5 UNITS: Performed by: INTERNAL MEDICINE

## 2025-02-04 PROCEDURE — 25010000002 HYDRALAZINE PER 20 MG: Performed by: HOSPITALIST

## 2025-02-04 PROCEDURE — 82948 REAGENT STRIP/BLOOD GLUCOSE: CPT

## 2025-02-04 PROCEDURE — 80069 RENAL FUNCTION PANEL: CPT | Performed by: INTERNAL MEDICINE

## 2025-02-04 PROCEDURE — 63710000001 PROMETHAZINE PER 25 MG: Performed by: STUDENT IN AN ORGANIZED HEALTH CARE EDUCATION/TRAINING PROGRAM

## 2025-02-04 PROCEDURE — 83735 ASSAY OF MAGNESIUM: CPT | Performed by: NURSE PRACTITIONER

## 2025-02-04 PROCEDURE — 85025 COMPLETE CBC W/AUTO DIFF WBC: CPT | Performed by: HOSPITALIST

## 2025-02-04 PROCEDURE — 99239 HOSP IP/OBS DSCHRG MGMT >30: CPT | Performed by: INTERNAL MEDICINE

## 2025-02-04 PROCEDURE — 25810000003 SODIUM CHLORIDE 0.9 % SOLUTION: Performed by: INTERNAL MEDICINE

## 2025-02-04 PROCEDURE — 63710000001 INSULIN GLARGINE PER 5 UNITS: Performed by: INTERNAL MEDICINE

## 2025-02-04 RX ORDER — PROMETHAZINE HYDROCHLORIDE 25 MG/1
6.25 TABLET ORAL EVERY 8 HOURS PRN
Status: DISCONTINUED | OUTPATIENT
Start: 2025-02-04 | End: 2025-02-04 | Stop reason: HOSPADM

## 2025-02-04 RX ORDER — ONDANSETRON 2 MG/ML
4 INJECTION INTRAMUSCULAR; INTRAVENOUS EVERY 6 HOURS PRN
Status: DISCONTINUED | OUTPATIENT
Start: 2025-02-04 | End: 2025-02-04

## 2025-02-04 RX ORDER — AMLODIPINE BESYLATE 5 MG/1
5 TABLET ORAL DAILY
Qty: 30 TABLET | Refills: 0 | Status: SHIPPED | OUTPATIENT
Start: 2025-02-04 | End: 2025-03-06

## 2025-02-04 RX ORDER — GUAIFENESIN 200 MG/10ML
200 LIQUID ORAL EVERY 4 HOURS PRN
Status: DISCONTINUED | OUTPATIENT
Start: 2025-02-04 | End: 2025-02-04 | Stop reason: HOSPADM

## 2025-02-04 RX ORDER — FENTANYL/ROPIVACAINE/NS/PF 2-625MCG/1
15 PLASTIC BAG, INJECTION (ML) EPIDURAL ONCE
Status: COMPLETED | OUTPATIENT
Start: 2025-02-04 | End: 2025-02-04

## 2025-02-04 RX ORDER — GUAIFENESIN 200 MG/10ML
LIQUID ORAL
Status: DISPENSED
Start: 2025-02-04 | End: 2025-02-04

## 2025-02-04 RX ADMIN — FAMOTIDINE 40 MG: 20 TABLET ORAL at 08:35

## 2025-02-04 RX ADMIN — ACETAMINOPHEN 650 MG: 325 TABLET ORAL at 03:56

## 2025-02-04 RX ADMIN — POTASSIUM PHOSPHATE, MONOBASIC AND POTASSIUM PHOSPHATE, DIBASIC 15 MMOL: 224; 236 INJECTION, SOLUTION, CONCENTRATE INTRAVENOUS at 09:30

## 2025-02-04 RX ADMIN — FOLIC ACID 1 MG: 1 TABLET ORAL at 08:35

## 2025-02-04 RX ADMIN — HYDROXYZINE HYDROCHLORIDE 25 MG: 25 TABLET, FILM COATED ORAL at 03:56

## 2025-02-04 RX ADMIN — AMLODIPINE BESYLATE 5 MG: 5 TABLET ORAL at 08:35

## 2025-02-04 RX ADMIN — PROMETHAZINE HYDROCHLORIDE 6.25 MG: 25 TABLET ORAL at 04:55

## 2025-02-04 RX ADMIN — Medication 10 ML: at 08:37

## 2025-02-04 RX ADMIN — INSULIN LISPRO 3 UNITS: 100 INJECTION, SOLUTION INTRAVENOUS; SUBCUTANEOUS at 08:36

## 2025-02-04 RX ADMIN — HYDRALAZINE HYDROCHLORIDE 10 MG: 20 INJECTION INTRAMUSCULAR; INTRAVENOUS at 03:56

## 2025-02-04 RX ADMIN — BUPROPION HYDROCHLORIDE 300 MG: 150 TABLET, EXTENDED RELEASE ORAL at 08:35

## 2025-02-04 RX ADMIN — GUAIFENESIN 200 MG: 100 LIQUID ORAL at 01:25

## 2025-02-04 RX ADMIN — Medication 10 ML: at 08:36

## 2025-02-04 RX ADMIN — ATORVASTATIN CALCIUM 40 MG: 40 TABLET, FILM COATED ORAL at 08:35

## 2025-02-04 RX ADMIN — INSULIN GLARGINE 30 UNITS: 100 INJECTION, SOLUTION SUBCUTANEOUS at 08:36

## 2025-02-04 NOTE — PLAN OF CARE
Goal Outcome Evaluation:  Plan of Care Reviewed With: patient        Progress: no change    Patient  covered with sliding scale. Patient with nausea-compazine given.  Patient very anxious-Atarax given (patient was concerned that she didn't get her Bupropion EMAR checked and informed patient she had received. Patient had coughing spell-Robitussin ordered and given   Patient hypertensive Hydralazine given

## 2025-02-04 NOTE — DISCHARGE SUMMARY
[facility]         HOSPITALIST  DISCHARGE SUMMARY    Patient Name: Liyah Butts  : 1980  MRN: [id number]    Date of Admission: 2025  Date of Discharge:  25  Primary Care Physician: Mayela Bruce APRN    Consults       Date and Time Order Name Status Description    2/3/2025  8:32 AM Inpatient Pulmonology Consult      2025  7:26 PM Hospitalist (on-call MD unless specified)              Active and Resolved Hospital Problems:  Euglycemic diabetic ketoacidosis  Insulin-dependent type 2 diabetes  Anion gap metabolic acidosis  Hypophosphatemia  Essential hypertension  Hyperlipidemia  Chronic anxiety    Hospital Course     Hospital Course:  Liyah Butts is a 44 y.o. female with insulin-dependent diabetes melitis who presented with abdominal pain, nausea and vomiting.  CT abdomen pelvis without contrast reported with mildly dilated small bowel loops which may reflect ileus otherwise no acute findings.  Serum glucose 250. pH of 7.5, pCO2 24 and pO2 39 on VBG.  UA 3+ glucose, trace ketones.  Urine acetone negative.  Anion gap 20.  Noted to be on Jardiance which was discontinued.  Admitted to ICU on fluid exchanges and insulin drip.  Switched to basal/bolus insulin once appropriate and tolerating oral intake.  Hemoglobin A1c 6.9.  Will continue home Tresiba and mealtime Novolog.  Has follow-up with endocrinology at [facility] in March.  Recommended PCP follow-up in 1 to 2 weeks.  Discharged home in stable condition      Day of Discharge     Vital Signs:  Temp:  [98 °F (36.7 °C)-99 °F (37.2 °C)] 98 °F (36.7 °C)  Heart Rate:  [] 108  Resp:  [20] 20  BP: (141-177)/(55-91) 144/66  Physical Exam:   GENERAL: conversant and nontoxic.  HEART: RRR. No edema  LUNGS: nonlabored  ABDOMEN: Soft, nondistended  NEUROLOGIC: Alert, CN intact    Discharge Details        Discharge Medications        Continue These Medications        Instructions Start Date   amLODIPine 5 MG  tablet  Commonly known as: NORVASC   5 mg, Oral, Daily      atorvastatin 40 MG tablet  Commonly known as: LIPITOR   40 mg, Oral, Every Night at Bedtime      B-complex with vitamin C tablet   1 tablet, Oral, Daily      buPROPion  MG 24 hr tablet  Commonly known as: Wellbutrin XL   300 mg, Oral, Every Morning      famotidine 40 MG tablet  Commonly known as: PEPCID   40 mg, Oral, 2 Times Daily      fenofibrate 145 MG tablet  Commonly known as: TRICOR   145 mg, Oral, Daily      folic acid 1 MG tablet  Commonly known as: FOLVITE   1 mg, Oral, Daily      freestyle lancets   1 each, Other, 3 Times Daily Before Meals      FREESTYLE LITE test strip  Generic drug: glucose blood   1 each, Other, 3 Times Daily Before Meals, Use as instructed      hydrOXYzine 25 MG tablet  Commonly known as: ATARAX   25 mg, Oral, Every 6 Hours PRN      Insulin Aspart FlexPen 100 UNIT/ML solution pen-injector   Inject 4 units before meals plus 1:50>150 as needed. MDD: 30 units      Ketostix strip  Generic drug: acetone (urine) test   1 strip if needed for high blood sugar (illness, vomiting). Call clinic if positive.      losartan 100 MG tablet  Commonly known as: Cozaar   100 mg, Oral, Daily      pantoprazole 40 MG EC tablet  Commonly known as: PROTONIX   40 mg, Oral, 2 Times Daily Before Meals      TiZANidine 2 MG capsule  Commonly known as: ZANAFLEX   2 mg, Oral, 3 Times Daily PRN      Tresiba FlexTouch 100 UNIT/ML solution pen-injector injection  Generic drug: insulin degludec   Inject 32 Units under the skin into the appropriate area as directed Every Morning.Titrated as directed. MDD: 60 units      vitamin D 1.25 MG (36541 UT) capsule capsule  Commonly known as: ERGOCALCIFEROL   50,000 Units, Oral, Weekly, Take with a fatty meal             Stop These Medications      Jardiance 25 MG tablet tablet  Generic drug: empagliflozin              Allergies   Allergen Reactions   • Zofran [Ondansetron] Dizziness       Discharge  Disposition:  Home or Self Care    Diet:  Hospital:  Diet Order   Procedures   • Diet: Diabetic; Consistent Carbohydrate; Fluid Consistency: Thin (IDDSI 0)       Discharge Activity:   Activity Instructions       Activity as Tolerated              CODE STATUS:  Code Status and Medical Interventions: CPR (Attempt to Resuscitate); Full Support   Ordered at: 02/02/25 1944     Level Of Support Discussed With:    Patient     Code Status (Patient has no pulse and is not breathing):    CPR (Attempt to Resuscitate)     Medical Interventions (Patient has pulse or is breathing):    Full Support         Future Appointments   Date Time Provider Department Center   3/3/2025  9:15 AM Mayela Bruce APRN [facility]       Additional Instructions for the Follow-ups that You Need to Schedule       Discharge Follow-up with PCP   As directed       Currently Documented PCP:    Mayela Bruce APRN    PCP Phone Number:    [phone]     Follow Up Details: on 3/03/2025                Pertinent  and/or Most Recent Results     PROCEDURES:   NONE    LAB RESULTS:      Lab 02/04/25  0535 02/03/25  0348 02/02/25 2041 02/02/25  1752 02/02/25  1745 02/02/25  1445   WBC 12.58* 11.83* 13.29*  --   --  17.40*   HEMOGLOBIN 12.6 11.3* 13.7  --   --  14.5   HEMATOCRIT 41.2 35.2 43.1  --   --  44.6   PLATELETS 352 306 343  --   --  406   NEUTROS ABS 9.33* 10.51* 12.41*  --   --  16.05*   IMMATURE GRANS (ABS) 0.05 0.04 0.06*  --   --  0.08*   LYMPHS ABS 2.12 0.85 0.43*  --   --  0.45*   MONOS ABS 0.97* 0.39 0.32  --   --  0.59   EOS ABS 0.05 0.01 0.02  --   --  0.16   MCV 87.7 84.0 85.3  --   --  83.5   LACTATE  --   --  1.4 2.1* 2.0  --          Lab 02/04/25  0535 02/03/25  0815 02/03/25  0348 02/03/25  0020 02/02/25 2041   SODIUM 137 138 138 138 140   POTASSIUM 3.9 3.5 3.8 4.1 4.1   CHLORIDE 103 108* 107 106 105   CO2 14.8* 19.7* 19.0* 18.5* 18.9*   ANION GAP 19.2* 10.3 12.0 13.5 16.1*   BUN 19 24* 27* 29* 29*   CREATININE 1.15* 1.17*  1.23* 1.24* 1.42*   EGFR 60.4 59.1* 55.7* 55.1* 46.9*   GLUCOSE 200* 124* 200* 210* 196*   CALCIUM 8.4* 7.8* 7.6* 8.4* 8.9   MAGNESIUM 2.1 1.8 1.7 1.7 1.8   PHOSPHORUS 2.0* 2.3* 2.5 3.0 2.9   HEMOGLOBIN A1C  --   --   --   --  6.90*         Lab 02/04/25  0535 02/02/25  2041 02/02/25  1445   TOTAL PROTEIN  --  7.6 8.2   ALBUMIN 3.6 4.0 4.2   GLOBULIN  --  3.6 4.0   ALT (SGPT)  --  25 24   AST (SGOT)  --  33* 31   BILIRUBIN  --  0.4 0.6   ALK PHOS  --  55 64   LIPASE  --   --  33         Lab 02/03/25  0020 02/02/25 2041   HSTROP T 17* 15*                 Brief Urine Lab Results  (Last result in the past 365 days)        Color   Clarity   Blood   Leuk Est   Nitrite   Protein   CREAT   Urine HCG        02/02/25 2133 Yellow   Clear   Trace   Negative   Negative   100 mg/dL (2+)                 Microbiology Results (last 10 days)       ** No results found for the last 240 hours. **            XR Chest 1 View    Result Date: 2/2/2025  No active disease. Electronically Signed: Brian Blackwood MD  2/2/2025 7:55 PM EST  Workstation ID: [id number]    CT Abdomen Pelvis Without Contrast    Result Date: 2/2/2025  1.There are a few mildly dilated small bowel loops which may reflect a mild ileus. No bowel obstruction or definite evidence of enteritis identified. 2.No other acute findings in the abdomen or pelvis. Electronically Signed: Brian Blackwood MD  2/2/2025 7:15 PM EST  Workstation ID: [id number]          Labs Pending at Discharge: NONE      Time spent on Discharge including face to face service:  >30 minutes    Electronically signed by Uziel Long DO, 02/04/25, 8:18 AM EST.

## 2025-02-04 NOTE — OUTREACH NOTE
Prep Survey      Flowsheet Row Responses   [facility] patient discharged from? [facility]   Is LACE score < 7 ? Yes   Eligibility [facility]   Date of Admission 02/02/25   Date of Discharge 02/04/25   Discharge diagnosis DKA (diabetic ketoacidosis)   Does the patient have one of the following disease processes/diagnoses(primary or secondary)? Other   Prep survey completed? Yes            Rosy MALIK - Registered Nurse

## 2025-02-05 ENCOUNTER — TRANSITIONAL CARE MANAGEMENT TELEPHONE ENCOUNTER (OUTPATIENT)
Dept: CALL CENTER | Facility: HOSPITAL | Age: 45
End: 2025-02-05
Payer: MEDICARE

## 2025-02-05 NOTE — OUTREACH NOTE
Call Center TCM Note      Flowsheet Row Responses   [facility] patient discharged from? [facility]   Does the patient have one of the following disease processes/diagnoses(primary or secondary)? Other   TCM attempt successful? No   Unsuccessful attempts Attempt 2  [attempted pt and ]            Gabrielle Jane RN    2/5/2025, 15:06 EST

## 2025-02-05 NOTE — OUTREACH NOTE
Call Center TCM Note      Flowsheet Row Responses   [facility] patient discharged from? [facility]   Does the patient have one of the following disease processes/diagnoses(primary or secondary)? Other   TCM attempt successful? No  [vr for William Butts]   Unsuccessful attempts Attempt 1  [attempted pt and ]   Call Status Left message            Gabrielle Jane RN    2/5/2025, 11:32 EST

## 2025-02-06 ENCOUNTER — TRANSITIONAL CARE MANAGEMENT TELEPHONE ENCOUNTER (OUTPATIENT)
Dept: CALL CENTER | Facility: HOSPITAL | Age: 45
End: 2025-02-06
Payer: MEDICARE

## 2025-02-06 NOTE — OUTREACH NOTE
Call Center TCM Note      Flowsheet Row Responses   [facility] patient discharged from? [place]   Does the patient have one of the following disease processes/diagnoses(primary or secondary)? Other   TCM attempt successful? No   Unsuccessful attempts Attempt 3  [attempted patient and spouse.]   Revoked Reason Other  [attempted x 3, unsuccessful. Revoked per unit policy.]   Does the patient have an appointment with their PCP within 7-14 days of discharge? No   Nursing Interventions Routed TCM call to PCP office, PCP office requested to make appointment - message sent            Disha Montano RN    2/6/2025, 10:20 EST

## 2025-02-23 NOTE — TELEPHONE ENCOUNTER
PT is requesting pantoprazole. Order pended.  Last OV: 12/28/23  Next OV: 7/2/24  Last refill: 11/13/23     The patient is a 89y Male complaining of

## 2025-06-04 ENCOUNTER — OFFICE VISIT (OUTPATIENT)
Dept: INTERNAL MEDICINE | Age: 45
End: 2025-06-04
Payer: MEDICARE

## 2025-06-04 VITALS
BODY MASS INDEX: 37.21 KG/M2 | HEIGHT: 63 IN | RESPIRATION RATE: 18 BRPM | DIASTOLIC BLOOD PRESSURE: 76 MMHG | SYSTOLIC BLOOD PRESSURE: 148 MMHG | HEART RATE: 73 BPM | TEMPERATURE: 97.5 F | OXYGEN SATURATION: 97 %

## 2025-06-04 DIAGNOSIS — Z00.00 ENCOUNTER FOR ANNUAL WELLNESS EXAM IN MEDICARE PATIENT: Primary | ICD-10-CM

## 2025-06-04 DIAGNOSIS — E11.65 TYPE 2 DIABETES MELLITUS WITH HYPERGLYCEMIA, WITH LONG-TERM CURRENT USE OF INSULIN: ICD-10-CM

## 2025-06-04 DIAGNOSIS — E55.9 VITAMIN D DEFICIENCY: ICD-10-CM

## 2025-06-04 DIAGNOSIS — E53.8 FOLATE DEFICIENCY: ICD-10-CM

## 2025-06-04 DIAGNOSIS — Z12.31 ENCOUNTER FOR SCREENING MAMMOGRAM FOR MALIGNANT NEOPLASM OF BREAST: ICD-10-CM

## 2025-06-04 DIAGNOSIS — S62.660A NONDISPLACED FRACTURE OF DISTAL PHALANX OF RIGHT INDEX FINGER, INITIAL ENCOUNTER FOR CLOSED FRACTURE: ICD-10-CM

## 2025-06-04 DIAGNOSIS — N18.9 CHRONIC KIDNEY DISEASE, UNSPECIFIED CKD STAGE: ICD-10-CM

## 2025-06-04 DIAGNOSIS — F41.9 ANXIETY AND DEPRESSION: ICD-10-CM

## 2025-06-04 DIAGNOSIS — Z79.4 TYPE 2 DIABETES MELLITUS WITH HYPERGLYCEMIA, WITH LONG-TERM CURRENT USE OF INSULIN: ICD-10-CM

## 2025-06-04 DIAGNOSIS — I10 ESSENTIAL HYPERTENSION: ICD-10-CM

## 2025-06-04 DIAGNOSIS — E78.5 HYPERLIPIDEMIA, UNSPECIFIED HYPERLIPIDEMIA TYPE: ICD-10-CM

## 2025-06-04 DIAGNOSIS — F32.A ANXIETY AND DEPRESSION: ICD-10-CM

## 2025-06-04 DIAGNOSIS — M25.562 LEFT KNEE PAIN, UNSPECIFIED CHRONICITY: ICD-10-CM

## 2025-06-04 RX ORDER — CHLORPHENIR/PHENYLEPH/ASPIRIN 2-7.8-325
TABLET, EFFERVESCENT ORAL
Qty: 50 EACH | Refills: 2 | Status: CANCELLED | OUTPATIENT
Start: 2025-06-04

## 2025-06-04 RX ORDER — PEN NEEDLE, DIABETIC 32GX 5/32"
NEEDLE, DISPOSABLE MISCELLANEOUS
COMMUNITY
Start: 2025-05-30

## 2025-06-04 RX ORDER — HYDROCODONE BITARTRATE AND ACETAMINOPHEN 5; 325 MG/1; MG/1
1 TABLET ORAL EVERY 6 HOURS PRN
Qty: 12 TABLET | Refills: 0 | Status: SHIPPED | OUTPATIENT
Start: 2025-06-04

## 2025-06-04 RX ORDER — CHLORPHENIR/PHENYLEPH/ASPIRIN 2-7.8-325
TABLET, EFFERVESCENT ORAL
Qty: 50 EACH | Refills: 2 | Status: SHIPPED | OUTPATIENT
Start: 2025-06-04

## 2025-06-04 RX ORDER — HYDROCHLOROTHIAZIDE 12.5 MG/1
CAPSULE ORAL
COMMUNITY
Start: 2025-04-23

## 2025-06-04 NOTE — PROGRESS NOTES
Subjective   The ABCs of the Annual Wellness Visit  Medicare Wellness Visit      Liyah Butts is a 44 y.o. patient who presents for a Medicare Wellness Visit.    The following portions of the patient's history were reviewed and   updated as appropriate: allergies, current medications, past family history, past medical history, past social history, past surgical history, and problem list.    Compared to one year ago, the patient's physical   health is better.  Compared to one year ago, the patient's mental   health is better.    Recent Hospitalizations:  This patient has had a [facility] admission record on file within the last 365 days.  Current Medical Providers:  Patient Care Team:  Mayela Bruce APRN as PCP - General (Nurse Practitioner)  Yvonne Sigala APRN as Nurse Practitioner (Nurse Practitioner)  Gerry Henry MD as Consulting Physician (Nephrology)  Miguel Lockwood DPM as Consulting Physician (Podiatry)  Sen Blanc MD as Consulting Physician (Ophthalmology)  Gail Gray APRN (Nurse Practitioner)    Outpatient Medications Prior to Visit   Medication Sig Dispense Refill    amLODIPine (NORVASC) 5 MG tablet Take 1 tablet by mouth Daily.      atorvastatin (LIPITOR) 40 MG tablet Take 1 tablet by mouth every night at bedtime. 90 tablet 1    B Complex-C (B-complex with vitamin C) tablet Take 1 tablet by mouth Daily. 30 tablet 11    buPROPion XL (Wellbutrin XL) 300 MG 24 hr tablet Take 1 tablet by mouth Every Morning. 90 tablet 1    Continuous Glucose Sensor (FreeStyle Lisbeth 3 Plus Sensor) CHANGE EVERY 15 DAYS      Embecta Pen Needle Imelda 2 Gen 32G X 4 MM misc       empagliflozin (Jardiance) 25 MG tablet tablet Take 1 tablet by mouth Daily.      famotidine (PEPCID) 40 MG tablet Take 1 tablet by mouth 2 (Two) Times a Day. 60 tablet 3    fenofibrate (TRICOR) 145 MG tablet Take 1 tablet by mouth Daily. 90 tablet 1    folic acid (FOLVITE) 1 MG tablet Take 1 tablet  by mouth Daily. 90 tablet 0    glucose blood test strip 1 each by Other route 3 (Three) Times a Day Before Meals. Use as instructed 90 each 12    hydrOXYzine (ATARAX) 25 MG tablet Take 1 tablet by mouth Every 6 (Six) Hours As Needed for Itching. 360 tablet 1    Insulin Aspart FlexPen 100 UNIT/ML solution pen-injector Inject 4 units before meals plus 1:50>150 as needed. MDD: 30 units 15 mL 5    insulin degludec (Tresiba FlexTouch) 100 UNIT/ML solution pen-injector injection Inject 32 Units under the skin into the appropriate area as directed Every Morning.Titrated as directed. MDD: 60 units 30 mL 5    linaclotide (LINZESS) 145 MCG capsule capsule Take 1 capsule by mouth Daily.      losartan (Cozaar) 100 MG tablet Take 1 tablet by mouth Daily. 90 tablet 1    OneTouch Delica Lancets 33G misc 1 each by Other route 3 (Three) Times a Day Before Meals. 100 each 12    pantoprazole (PROTONIX) 40 MG EC tablet Take 1 tablet by mouth 2 (Two) Times a Day Before Meals. 180 tablet 1    TiZANidine (ZANAFLEX) 2 MG capsule Take 1 capsule by mouth 3 (Three) Times a Day As Needed for Muscle Spasms. 30 capsule 1    vitamin D (ERGOCALCIFEROL) 1.25 MG (59063 UT) capsule capsule Take 1 capsule by mouth 1 (One) Time Per Week. Take with a fatty meal 12 capsule 2    Acetone, Urine, Test (Ketone Test) strip 1 strip if needed for high blood sugar (illness, vomiting). Call clinic if positive. 50 each 2    empagliflozin (Jardiance) 10 MG tablet tablet Take 1 tablet by mouth Daily.       No facility-administered medications prior to visit.     Opioid medication/s are on active medication list.  and I have evaluated her active treatment plan and pain score trends (see table).  Vitals:    06/04/25 1027   PainSc: 0-No pain     I have reviewed the chart for potential of high risk medication and harmful drug interactions in the elderly.        Aspirin is not on active medication list.  Aspirin use is not indicated based on review of current medical  "condition/s. Risk of harm outweighs potential benefits.  .    Patient Active Problem List   Diagnosis    Headache, migraine    HLD (hyperlipidemia)    Hyperprolactinemia    Macroadenoma    Obesity    Pituitary tumor    Diabetic ketoacidosis without coma associated with type 2 diabetes mellitus    Nausea and vomiting    DKA (diabetic ketoacidosis)    Vitamin D deficiency    Essential hypertension    Type 2 diabetes mellitus with hyperglycemia, with long-term current use of insulin    Anxiety and depression    Folate deficiency     Advance Care Planning Advance Directive is not on file.  ACP discussion was held with the patient during this visit. Patient does not have an advance directive, declines further assistance.            Objective   Vitals:    06/04/25 1027   BP: 148/76   BP Location: Left arm   Patient Position: Sitting   Cuff Size: Large Adult   Pulse: 73   Resp: 18   Temp: 97.5 °F (36.4 °C)   TempSrc: Temporal   SpO2: 97%   Height: 160 cm (62.99\")   PainSc: 0-No pain       Estimated body mass index is 37.21 kg/m² as calculated from the following:    Height as of this encounter: 160 cm (62.99\").    Weight as of 6/3/25: 95.3 kg (210 lb).    Class 2 Severe Obesity (BMI >=35 and <=39.9). Obesity-related health conditions include the following: hypertension, diabetes mellitus, and dyslipidemias. Obesity is improving with lifestyle modifications. BMI is is above average; BMI management plan is completed. We discussed portion control and increasing exercise.           Does the patient have evidence of cognitive impairment? No                                                                                                Health  Risk Assessment    Smoking Status:  Social History     Tobacco Use   Smoking Status Never    Passive exposure: Never   Smokeless Tobacco Never     Alcohol Consumption:  Social History     Substance and Sexual Activity   Alcohol Use No       Fall Risk Screen  STEADI Fall Risk Assessment was " completed, and patient is at MODERATE risk for falls. Assessment completed on:2025    Depression Screening   Little interest or pleasure in doing things? Several days   Feeling down, depressed, or hopeless? Not at all   PHQ-2 Total Score 1      Health Habits and Functional and Cognitive Screenin/4/2025    10:30 AM   Functional & Cognitive Status   Do you have difficulty bathing yourself, getting dressed or grooming yourself? No   Do you have difficulty using the toilet? No   Do you have difficulty moving around from place to place? No   Do you have trouble with steps or getting out of a bed or a chair? No   Current Diet Other   Dental Exam Not up to date   Eye Exam Not up to date   Exercise (times per week) 0 times per week   Current Exercises Include No Regular Exercise   Do you need help using the phone?  No   Are you deaf or do you have serious difficulty hearing?  No   Do you need help to go to places out of walking distance? No   Do you need help shopping? No   Do you need help preparing meals?  No   Do you need help with housework?  No   Do you need help with laundry? No   Do you need help taking your medications? No   Do you need help managing money? No   Do you ever drive or ride in a car without wearing a seat belt? No   Have you felt unusual stress, anger or loneliness in the last month? No   Who do you live with? Spouse   If you need help, do you have trouble finding someone available to you? No   Have you been bothered in the last four weeks by sexual problems? No   Do you have difficulty concentrating, remembering or making decisions? No           Age-appropriate Screening Schedule:  Refer to the list below for future screening recommendations based on patient's age, sex and/or medical conditions. Orders for these recommended tests are listed in the plan section. The patient has been provided with a written plan.    Health Maintenance List  Health Maintenance   Topic Date Due    MAMMOGRAM   Never done    DIABETIC EYE EXAM  03/01/2024    DIABETIC FOOT EXAM  06/21/2024    LIPID PANEL  06/24/2025    HEMOGLOBIN A1C  08/02/2025    Hepatitis B (1 of 3 - 19+ 3-dose series) 06/12/2025 (Originally 7/24/1999)    COVID-19 Vaccine (1 - 2024-25 season) 06/12/2025 (Originally 9/1/2024)    TDAP/TD VACCINES (1 - Tdap) 08/28/2025 (Originally 7/24/1999)    INFLUENZA VACCINE  07/01/2025    URINE MICROALBUMIN-CREATININE RATIO (uACR)  11/25/2025    ANNUAL WELLNESS VISIT  06/04/2026    HEPATITIS C SCREENING  Completed    Pneumococcal Vaccine 0-49  Completed                                                                                                                                                CMS Preventative Services Quick Reference  Risk Factors Identified During Encounter  None Identified    The above risks/problems have been discussed with the patient.  Pertinent information has been shared with the patient in the After Visit Summary.  An After Visit Summary and PPPS were made available to the patient.    Follow Up:   Next Medicare Wellness visit to be scheduled in 1 year.          Additional E&M Note during same encounter follows:  Patient has multiple medical problems which are significant and separately identifiable that require additional work above and beyond the Medicare Wellness Visit.      Chief Complaint  Medicare Wellness-subsequent (44 year old female here today for her annual medicare wellness. Pt needs to complete her labs )    Liyah Butts is a 44 y.o. female who presents to [facility]     History of Present Illness  The patient presents for a Medicare wellness exam and follow-up.  She is accompanied by her .    She reports an improvement in her physical and mental health compared to the previous year. She has an advanced directive in place. She is not experiencing any urinary tract symptoms and does not require any medication refills at this time. She  has been prescribed ketone test strips, which are available at [facility].    She experienced symptoms of diabetic ketoacidosis (DKA) despite maintaining her A1c levels within the normal range. She was vomiting and was diagnosed with DKA, leading to hospitalization. During her hospital stay, she underwent extensive blood work. She was taken off Jardiance and put back on 10 mg, but her blood sugar levels remained elevated despite her efforts to control them. Upon consultation with Dr. Gray, it was determined that she had a stomach flare-up rather than DKA. Despite constant hydration and dietary vigilance, her blood sugar levels remained uncontrolled. She was then put back on Jardiance 25 mg, which resulted in improved control of her blood sugar levels. She continues to experience stomach pain and nausea but has developed coping mechanisms to manage these symptoms. She has medication available for use if she becomes ill but does not take it unless necessary. She is scheduled to see a nephrologist next week and will have blood work done for him. She is currently on Jardiance 25 mg, Tresiba 32 units, and mealtime insulin as needed.    She is currently taking amlodipine 5 mg for blood pressure management. She is also on atorvastatin 40 mg and fenofibrate 145 mg daily for cholesterol and triglyceride management.    She fractured the tip of her finger yesterday and was seen and x-rayed at urgent care. She was advised to wear a splint but was not informed about the duration of its use. She experiences significant pain in her finger, which radiates downwards upon arm movement. Initially, she thought she had cracked her fingernail, but the subsequent bruising led her to believe it was a fracture. She has been managing the throbbing pain by elevating her hand above her heart. She has not been prescribed any pain medication for this injury. She has previously taken hydrocodone for pain management.    She reports excessive sweating  "during meals, regardless of the type of food consumed. This symptom has been intermittent but is currently worsening.    She experiences left knee pain upon standing and hyperextension, which occasionally results in the knee getting stuck. She has been experiencing a crunching sensation in her knee for some time.    She experiences right-sided back pain upon waking up in the morning, which she initially attributed to a muscle issue. The pain is cramp-like and resolves as she begins to move.    She is on Linzess 145 mcg, folic acid, B complex, Atarax as needed, losartan 100 mg, bupropion 300 mg, Zanaflex 2 mg, vitamin D 50,000 units once a week, pantoprazole 40 mg, and Pepcid 20 mg.    Objective   Vital Signs:   Vitals:    06/04/25 1027   BP: 148/76   BP Location: Left arm   Patient Position: Sitting   Cuff Size: Large Adult   Pulse: 73   Resp: 18   Temp: 97.5 °F (36.4 °C)   TempSrc: Temporal   SpO2: 97%   Height: 160 cm (62.99\")   PainSc: 0-No pain       Wt Readings from Last 3 Encounters:   06/03/25 95.3 kg (210 lb)   03/18/25 95.3 kg (210 lb)   02/04/25 95.3 kg (210 lb 1.6 oz)     BP Readings from Last 3 Encounters:   06/04/25 148/76   06/03/25 156/85   03/18/25 163/79       Physical Exam  Vitals reviewed.   Constitutional:       General: She is not in acute distress.  Eyes:      General:         Left eye: Discharge present.  Cardiovascular:      Rate and Rhythm: Normal rate and regular rhythm.      Heart sounds: Normal heart sounds.   Pulmonary:      Effort: Pulmonary effort is normal.      Breath sounds: Normal breath sounds. No wheezing, rhonchi or rales.   Abdominal:      General: There is no distension.      Palpations: Abdomen is soft. There is no mass.      Tenderness: There is no abdominal tenderness. There is no right CVA tenderness or left CVA tenderness.   Musculoskeletal:      Right lower leg: No edema.      Left lower leg: No edema.      Comments: Splint in place to right index finger.   Skin:     " General: Skin is warm and dry.   Neurological:      General: No focal deficit present.      Mental Status: She is alert.   Psychiatric:         Thought Content: Thought content normal.           The following data was reviewed by SHAE Mcgee on 06/04/2025  Common Labs   Common labs          2/2/2025    14:45 2/2/2025    20:41 2/3/2025    00:20 2/3/2025    03:48 2/3/2025    08:15 2/4/2025    05:35   Common Labs   Glucose 250  196  210  200  124  200    BUN 34  29  29  27  24  19    Creatinine 1.50  1.42  1.24  1.23  1.17  1.15    Sodium 139  140  138  138  138  137    Potassium 4.5  4.1  4.1  3.8  3.5  3.9    Chloride 103  105  106  107  108  103    Calcium 10.0  8.9  8.4  7.6  7.8  8.4    Albumin 4.2  4.0     3.6    Total Bilirubin 0.6  0.4        Alkaline Phosphatase 64  55        AST (SGOT) 31  33        ALT (SGPT) 24  25        WBC 17.40  13.29   11.83   12.58    Hemoglobin 14.5  13.7   11.3   12.6    Hematocrit 44.6  43.1   35.2   41.2    Platelets 406  343   306   352    Hemoglobin A1C  6.90            Results  The following results are independently reviewed and interpreted by myself.    Labs   - A1c: 02/2025, 6.9    Imaging   - X-ray of the right index finger: Nondisplaced transverse fracture of the distal phalange    Narrative & Impression   XR FINGER 2+ VW RIGHT     Date of Exam: 6/3/2025 7:22 PM EDT     Indication: Finger pain, swelling and bruising to the distal phalanx.     Comparison: None available.     Findings:  There is a nondisplaced transverse fracture through the distal tuft of the distal phalanx of the second digit. There is no intra-articular extension. There is mild joint space narrowing at the interphalangeal joints and metacarpal phalangeal joint.     IMPRESSION:  Impression:  Nondisplaced transverse fracture through the distal tuft of the distal phalanx of the second digit. No intra-articular extension.    Electronically Signed: Laci Williamson MD    6/3/2025 8:44 PM EDT     Workstation ID: [id number]       Assessment & Plan   Diagnoses and all orders for this visit:    1. Encounter for annual wellness exam in Medicare patient (Primary)    2. Nondisplaced fracture of distal phalanx of right index finger, initial encounter for closed fracture  -     Ambulatory Referral to Orthopedic Surgery  -     HYDROcodone-acetaminophen (NORCO) 5-325 MG per tablet; Take 1 tablet by mouth Every 6 (Six) Hours As Needed for Moderate Pain.  Dispense: 12 tablet; Refill: 0    3. Type 2 diabetes mellitus with hyperglycemia, with long-term current use of insulin  -     Hemoglobin A1c; Future  -     Microalbumin / Creatinine Urine Ratio - Urine, Clean Catch; Future  -     Comprehensive Metabolic Panel; Future  -     CBC & Differential; Future  -     Hemoglobin A1c; Future  -     Microalbumin / Creatinine Urine Ratio - Urine, Clean Catch; Future    4. Essential hypertension  -     Comprehensive Metabolic Panel; Future  -     CBC & Differential; Future  -     TSH Rfx On Abnormal To Free T4; Future  -     TSH Rfx On Abnormal To Free T4; Future    5. Hyperlipidemia, unspecified hyperlipidemia type  -     Lipid Panel; Future  -     Lipid Panel; Future    6. Left knee pain, unspecified chronicity  -     Ambulatory Referral to Orthopedic Surgery    7. Chronic kidney disease, unspecified CKD stage    8. Anxiety and depression    9. Vitamin D deficiency  -     Vitamin D,25-Hydroxy; Future  -     Vitamin D,25-Hydroxy; Future    10. Folate deficiency  -     CBC & Differential; Future  -     Vitamin B12; Future  -     Folate; Future  -     Vitamin B12; Future  -     Folate; Future    11. Encounter for screening mammogram for malignant neoplasm of breast  -     Mammo Screening Digital Tomosynthesis Bilateral With CAD; Future    Other orders  -     Acetone, Urine, Test (Ketone Test) strip; 1 strip if needed for high blood sugar (illness, vomiting). Call clinic if positive.  Dispense: 50 each; Refill: 2        Assessment &  Plan  1. Medicare wellness examination.  - Her last recorded A1c level from 02/2025 was 6.9, indicating well-controlled diabetes.  - A mammogram has been ordered.  - Fasting labs have been ordered to assess cholesterol levels.  - A urine microalbuminuria test has also been ordered.    2. Nondisplaced fracture of the distal phalange of the right index finger.  - She reports significant pain and bruising.  - An orthopedic referral has been made for further evaluation and further management.  - Hydrocodone has been prescribed for pain management, with instructions to take it only as needed.  Mikhail was reviewed.    3. Diabetes mellitus.  - She experienced symptoms of diabetic ketoacidosis (DKA) but was later determined to have a stomach flare-up.  - Her A1c level from 02/2025 was 6.9.  - She is currently on Jardiance 25 mg, Tresiba 32 units, and mealtime insulin as needed.  - She will continue her current regimen and follow up with her endocrinologist in 09/2025.    4. Hypertension.  - Her blood pressure reading today was 148/76.  - She is currently taking amlodipine 5 mg and losartan 100 mg.  - She will continue her current medication regimen.    5. Hyperlipidemia.  - She is currently on atorvastatin 40 mg and fenofibrate 145 mg daily.  - She will continue her current medication regimen.    6. Left knee pain.  - She reports pain and crunching in her left knee, especially when hyperextending it.  - This will be evaluated further by orthopedics during her referral visit.    7. Right-sided back pain.  - She experiences cramping pain in the mornings, which may be musculoskeletal but is located around the kidney area.  - She is advised to use a heating pad and will discuss this with her nephrologist next week.    8. Sweating while eating.  - She reports sweating while eating, regardless of the type of food or ambient temperature.  - She was advised to discuss this symptom with her endocrinologist.    9.  Anxiety  depression.  - Doing well and to continue Wellbutrin 300 mg daily and hydroxyzine 25 mg every 6 as needed.    10.  Vitamin D deficiency and folate deficiency.  - Continue current supplements.  -Monitor labs.    Follow-up  - The patient will follow up in approximately 6 months.     Class 2 Severe Obesity (BMI >=35 and <=39.9). Obesity-related health conditions include the following: hypertension, diabetes mellitus, and dyslipidemias. Obesity is improving with lifestyle modifications. BMI is is above average; BMI management plan is completed. We discussed portion control and increasing exercise.       FOLLOW UP  Return in about 6 months (around 12/4/2025) for Recheck.  Patient was given instructions and counseling regarding her condition or for health maintenance advice. Please see specific information pulled into the AVS if appropriate.     Patient or patient representative verbalized consent for the use of Ambient Listening during the visit with  SHAE Mcgee for chart documentation. 6/4/2025  10:32 EDT    SHAE Mcgee  06/04/25  17:16 EDT

## 2025-06-06 ENCOUNTER — TELEPHONE (OUTPATIENT)
Dept: ORTHOPEDIC SURGERY | Facility: CLINIC | Age: 45
End: 2025-06-06
Payer: MEDICARE

## 2025-06-06 NOTE — TELEPHONE ENCOUNTER
Nondisplaced fracture of distal phalanx of right index finger, initial encounter for closed fracture - Left knee pain, unspecified chronicity - PN 06/04/25 - XRAY R FINGER 06/03/25 -

## 2025-06-10 ENCOUNTER — LAB (OUTPATIENT)
Dept: INTERNAL MEDICINE | Age: 45
End: 2025-06-10
Payer: MEDICARE

## 2025-06-10 ENCOUNTER — OFFICE VISIT (OUTPATIENT)
Dept: ORTHOPEDIC SURGERY | Facility: CLINIC | Age: 45
End: 2025-06-10
Payer: MEDICARE

## 2025-06-10 VITALS — HEIGHT: 63 IN | WEIGHT: 210 LBS | BODY MASS INDEX: 37.21 KG/M2

## 2025-06-10 DIAGNOSIS — E55.9 VITAMIN D DEFICIENCY: ICD-10-CM

## 2025-06-10 DIAGNOSIS — E78.5 HYPERLIPIDEMIA, UNSPECIFIED HYPERLIPIDEMIA TYPE: Chronic | ICD-10-CM

## 2025-06-10 DIAGNOSIS — S62.639A CLOSED FRACTURE OF TUFT OF DISTAL PHALANX OF FINGER: Primary | ICD-10-CM

## 2025-06-10 DIAGNOSIS — E11.65 TYPE 2 DIABETES MELLITUS WITH HYPERGLYCEMIA, WITH LONG-TERM CURRENT USE OF INSULIN: Chronic | ICD-10-CM

## 2025-06-10 DIAGNOSIS — Z79.4 TYPE 2 DIABETES MELLITUS WITH HYPERGLYCEMIA, WITH LONG-TERM CURRENT USE OF INSULIN: Chronic | ICD-10-CM

## 2025-06-10 DIAGNOSIS — E53.8 FOLATE DEFICIENCY: ICD-10-CM

## 2025-06-10 DIAGNOSIS — I10 ESSENTIAL HYPERTENSION: ICD-10-CM

## 2025-06-10 LAB
25(OH)D3 SERPL-MCNC: 10.2 NG/ML (ref 30–100)
ALBUMIN SERPL-MCNC: 3.6 G/DL (ref 3.5–5.2)
ALBUMIN/GLOB SERPL: 1 G/DL
ALP SERPL-CCNC: 85 U/L (ref 39–117)
ALT SERPL W P-5'-P-CCNC: 14 U/L (ref 1–33)
ANION GAP SERPL CALCULATED.3IONS-SCNC: 13 MMOL/L (ref 5–15)
AST SERPL-CCNC: 24 U/L (ref 1–32)
BASOPHILS # BLD AUTO: 0.09 10*3/MM3 (ref 0–0.2)
BASOPHILS NFR BLD AUTO: 0.9 % (ref 0–1.5)
BILIRUB SERPL-MCNC: 0.3 MG/DL (ref 0–1.2)
BUN SERPL-MCNC: 25 MG/DL (ref 6–20)
BUN/CREAT SERPL: 25.8 (ref 7–25)
CALCIUM SPEC-SCNC: 9.5 MG/DL (ref 8.6–10.5)
CHLORIDE SERPL-SCNC: 104 MMOL/L (ref 98–107)
CHOLEST SERPL-MCNC: 264 MG/DL (ref 0–200)
CO2 SERPL-SCNC: 20 MMOL/L (ref 22–29)
CREAT SERPL-MCNC: 0.97 MG/DL (ref 0.57–1)
DEPRECATED RDW RBC AUTO: 42.5 FL (ref 37–54)
EGFRCR SERPLBLD CKD-EPI 2021: 74 ML/MIN/1.73
EOSINOPHIL # BLD AUTO: 0.33 10*3/MM3 (ref 0–0.4)
EOSINOPHIL NFR BLD AUTO: 3.5 % (ref 0.3–6.2)
ERYTHROCYTE [DISTWIDTH] IN BLOOD BY AUTOMATED COUNT: 13.7 % (ref 12.3–15.4)
FOLATE SERPL-MCNC: 9.62 NG/ML (ref 4.78–24.2)
GLOBULIN UR ELPH-MCNC: 3.5 GM/DL
GLUCOSE SERPL-MCNC: 198 MG/DL (ref 65–99)
HBA1C MFR BLD: 7.5 % (ref 4.8–5.6)
HCT VFR BLD AUTO: 38.8 % (ref 34–46.6)
HDLC SERPL-MCNC: 45 MG/DL (ref 40–60)
HGB BLD-MCNC: 12.9 G/DL (ref 12–15.9)
IMM GRANULOCYTES # BLD AUTO: 0.03 10*3/MM3 (ref 0–0.05)
IMM GRANULOCYTES NFR BLD AUTO: 0.3 % (ref 0–0.5)
LDLC SERPL CALC-MCNC: 159 MG/DL (ref 0–100)
LDLC/HDLC SERPL: 3.44 {RATIO}
LYMPHOCYTES # BLD AUTO: 2.98 10*3/MM3 (ref 0.7–3.1)
LYMPHOCYTES NFR BLD AUTO: 31.2 % (ref 19.6–45.3)
MAGNESIUM SERPL-MCNC: 2.2 MG/DL (ref 1.6–2.6)
MCH RBC QN AUTO: 28.4 PG (ref 26.6–33)
MCHC RBC AUTO-ENTMCNC: 33.2 G/DL (ref 31.5–35.7)
MCV RBC AUTO: 85.5 FL (ref 79–97)
MONOCYTES # BLD AUTO: 0.56 10*3/MM3 (ref 0.1–0.9)
MONOCYTES NFR BLD AUTO: 5.9 % (ref 5–12)
NEUTROPHILS NFR BLD AUTO: 5.57 10*3/MM3 (ref 1.7–7)
NEUTROPHILS NFR BLD AUTO: 58.2 % (ref 42.7–76)
NRBC BLD AUTO-RTO: 0 /100 WBC (ref 0–0.2)
PLATELET # BLD AUTO: 328 10*3/MM3 (ref 140–450)
PMV BLD AUTO: 9.9 FL (ref 6–12)
POTASSIUM SERPL-SCNC: 4.6 MMOL/L (ref 3.5–5.2)
PROT SERPL-MCNC: 7.1 G/DL (ref 6–8.5)
RBC # BLD AUTO: 4.54 10*6/MM3 (ref 3.77–5.28)
SODIUM SERPL-SCNC: 137 MMOL/L (ref 136–145)
TRIGL SERPL-MCNC: 320 MG/DL (ref 0–150)
TSH SERPL DL<=0.05 MIU/L-ACNC: 2.63 UIU/ML (ref 0.27–4.2)
VIT B12 BLD-MCNC: 375 PG/ML (ref 211–946)
VLDLC SERPL-MCNC: 60 MG/DL (ref 5–40)
WBC NRBC COR # BLD AUTO: 9.56 10*3/MM3 (ref 3.4–10.8)

## 2025-06-10 PROCEDURE — 83036 HEMOGLOBIN GLYCOSYLATED A1C: CPT | Performed by: NURSE PRACTITIONER

## 2025-06-10 PROCEDURE — 36415 COLL VENOUS BLD VENIPUNCTURE: CPT | Performed by: NURSE PRACTITIONER

## 2025-06-10 PROCEDURE — 82306 VITAMIN D 25 HYDROXY: CPT | Performed by: NURSE PRACTITIONER

## 2025-06-10 PROCEDURE — 85025 COMPLETE CBC W/AUTO DIFF WBC: CPT | Performed by: NURSE PRACTITIONER

## 2025-06-10 PROCEDURE — 83735 ASSAY OF MAGNESIUM: CPT | Performed by: NURSE PRACTITIONER

## 2025-06-10 PROCEDURE — 82607 VITAMIN B-12: CPT | Performed by: NURSE PRACTITIONER

## 2025-06-10 PROCEDURE — 82746 ASSAY OF FOLIC ACID SERUM: CPT | Performed by: NURSE PRACTITIONER

## 2025-06-10 PROCEDURE — 84443 ASSAY THYROID STIM HORMONE: CPT | Performed by: NURSE PRACTITIONER

## 2025-06-10 PROCEDURE — 80053 COMPREHEN METABOLIC PANEL: CPT | Performed by: NURSE PRACTITIONER

## 2025-06-10 PROCEDURE — 80061 LIPID PANEL: CPT | Performed by: NURSE PRACTITIONER

## 2025-06-10 NOTE — PROGRESS NOTES
"Chief Complaint  Pain and Initial Evaluation of the Right Hand       Subjective      Liyah Butts presents to [facility] for an evaluation of her right hand. She was holding her shoe when she hit the shoe and injured her right index finger. She went to urgent care on 06/03/25 where she had x-rays done and placed into a splint. She has pain with range of motion and states her finger is tender.     Allergies   Allergen Reactions    Ondansetron Dizziness and Other (See Comments)        Social History     Socioeconomic History    Marital status:    Tobacco Use    Smoking status: Never     Passive exposure: Never    Smokeless tobacco: Never   Vaping Use    Vaping status: Never Used   Substance and Sexual Activity    Alcohol use: No    Drug use: Yes     Frequency: 7.0 times per week     Types: Marijuana     Comment: at night to sleep- delta 8    Sexual activity: Yes     Partners: Female, Male     Birth control/protection: None, Partner of same sex        I reviewed the patient's chief complaint, history of present illness, review of systems, past medical history, surgical history, family history, social history, medications, and allergy list.     Review of Systems     Constitutional: Denies fevers, chills, weight loss  Cardiovascular: Denies chest pain, shortness of breath  Skin: Denies rashes, acute skin changes  Neurologic: Denies headache, loss of consciousness  MSK: right hand pain       Vital Signs:   Ht 160 cm (63\")   Wt 95.3 kg (210 lb)   BMI 37.20 kg/m²            Ortho Exam    Physical Exam  General:Alert. No acute distress   Right upper extremity: bruising and mild swelling to the right index finger and distal phalanx, tender to palpation, limited range of motion secondary to pain to the index finger, mild flexion  deformity, sensation intact to the medial, radial and ulnar nerve, neurovascularly intact, positive  pulses.      Procedures    Imaging Results (Most " Recent)       None             Result Review :       XR Finger 2+ View Right  Result Date: 6/3/2025  Narrative: XR FINGER 2+ VW RIGHT Date of Exam: 6/3/2025 7:22 PM EDT Indication: Finger pain, swelling and bruising to the distal phalanx. Comparison: None available. Findings: There is a nondisplaced transverse fracture through the distal tuft of the distal phalanx of the second digit. There is no intra-articular extension. There is mild joint space narrowing at the interphalangeal joints and metacarpal phalangeal joint.     Impression: Impression: Nondisplaced transverse fracture through the distal tuft of the distal phalanx of the second digit. No intra-articular extension. Electronically Signed: Laci Williamson MD  6/3/2025 8:44 PM EDT  Workstation ID: [id number]             Assessment and Plan     Diagnoses and all orders for this visit:    1. Closed fracture of tuft of distal phalanx of finger (Primary)        The patient presents here today for an evaluation  of her right index finger.     She will continue the splint for another few weeks and can remove the brace to work on gentle range of motion. She will continue current medications for pain control.     Call or return if worsening symptoms.    Follow Up     4 weeks     Will obtain X-Rays of right index finger at next visit.       Patient was given instructions and counseling regarding her condition or for health maintenance advice. Please see specific information pulled into the AVS if appropriate.     Scribed for Moreno Nixon MD by Bhumika Sanchez.  06/10/25   09:05 EDT    I have personally performed the services described in this document as scribed by the above individual and it is both accurate and complete. Moreno Nixon MD 06/10/25

## 2025-07-11 ENCOUNTER — OFFICE VISIT (OUTPATIENT)
Dept: ORTHOPEDIC SURGERY | Facility: CLINIC | Age: 45
End: 2025-07-11
Payer: MEDICARE

## 2025-07-11 VITALS
BODY MASS INDEX: 37.21 KG/M2 | HEIGHT: 63 IN | OXYGEN SATURATION: 96 % | HEART RATE: 69 BPM | DIASTOLIC BLOOD PRESSURE: 69 MMHG | WEIGHT: 210 LBS | SYSTOLIC BLOOD PRESSURE: 102 MMHG

## 2025-07-11 DIAGNOSIS — S62.639A CLOSED FRACTURE OF TUFT OF DISTAL PHALANX OF FINGER: ICD-10-CM

## 2025-07-11 DIAGNOSIS — M79.641 RIGHT HAND PAIN: Primary | ICD-10-CM

## 2025-07-11 NOTE — PROGRESS NOTES
"Chief Complaint  Follow-up of the Right Hand    Subjective          History of Present Illness      Liyah Butts is a 44 y.o. female  presents to [facility] for     Patient presents for follow-up evaluation of second finger distal tuft fracture.  She injured her finger on 6/3/2025.  She states she lost her splint that she was given at last visit she states that when she bangs the finger on things it might hurt but overall she states range of motion has improved and pain has improved.  She denies swelling or numbness and tingling.      Allergies   Allergen Reactions   • Ondansetron Dizziness and Other (See Comments)        Social History     Socioeconomic History   • Marital status:    Tobacco Use   • Smoking status: Never     Passive exposure: Never   • Smokeless tobacco: Never   Vaping Use   • Vaping status: Never Used   Substance and Sexual Activity   • Alcohol use: No   • Drug use: Yes     Frequency: 7.0 times per week     Types: Marijuana     Comment: at night to sleep- delta 8   • Sexual activity: Yes     Partners: Female, Male     Birth control/protection: None, Partner of same sex        REVIEW OF SYSTEMS    Constitutional: Awake alert and oriented x3, no acute distress, denies fevers, chills, weight loss  Respiratory: No respiratory distress  Vascular: Brisk cap refill, Intact distal pulses, No cyanosis, compartments soft with no signs or symptoms of compartment syndrome or DVT.   Cardiovascular: Denies chest pain, shortness of breath  Skin: Denies rashes, acute skin changes  Neurologic: Denies headache, loss of consciousness  MSK: Right hand pain      Objective   Vital Signs:   /69   Pulse 69   Ht 160 cm (62.99\")   Wt 95.3 kg (210 lb)   SpO2 96%   BMI 37.21 kg/m²     Body mass index is 37.21 kg/m².    Physical Exam       Right second finger: Nontender at fracture site skin is intact nail is intact, no pain with range of motion patient able to make a full " fist.      Orthopedic Injury Treatment    Date/Time: 7/11/2025 11:03 AM    Performed by: Jesús Ponce PA  Authorized by: Jesús Ponce PA    Anesthesia:  Local anesthesia used: no    Sedation:  Patient sedated: no    Immobilization: splint  Post-procedure neurovascular assessment: post-procedure neurovascularly intact  Patient tolerance: patient tolerated the procedure well with no immediate complications  Comments: Finger splint with coband      Imaging Results (Most Recent)       Procedure Component Value Units Date/Time    XR Finger 2+ View Right - Preliminary [[id number]] Resulted: 07/11/25 0936     Updated: 07/11/25 0936    This result has not been signed. Information might be incomplete.      Narrative:      View:AP/Lateral view(s)  Site: Right second finger  Indication: Right second finger pain  Study: X-rays ordered, taken in the office, and reviewed today  X-ray: Healing nondisplaced transverse fracture of the distal tuft of the   distal phalanx of the second digit, no increased displacement or   angulation fracture line still visible  Comparative data: Previous studies                   Assessment and Plan    Diagnoses and all orders for this visit:    1. Right hand pain (Primary)  -     XR Finger 2+ View Right    2. Closed fracture of tuft of distal phalanx of finger        Reviewed x-rays with the patient and her family they were advised patient would benefit from a new splint which she was advised to wear the splint for the next 2 to 3 weeks with activity follow-up in 4 weeks for recheck with x-rays    Call or return if worsening symptoms.    Follow Up   Return in about 4 weeks (around 8/8/2025).  Patient was given instructions and counseling regarding her condition or for health maintenance advice. Please see specific information pulled into the AVS if appropriate.       EMR Dragon/Transcription disclaimer:  Part of this note may be an electronic transcription/translation of  spoken language to printed text using the Dragon Dictation System

## 2025-07-29 NOTE — PROGRESS NOTES
Chief Complaint  Back Pain (45 year old female here today for right sided back pain. Pt states that it started a month ago with no known injury. )    Subjective      History of Present Illness  The patient presents for evaluation of worsening right-sided back pain and skin lesions.    She reports that her back pain has been getting worse over the past few months. The pain starts in the middle of her back and radiates down to her thigh, intensifying with movement. She experiences numbness in her leg when sitting and pain in her hip area upon touch. Despite trying muscle relaxers, ice, heat, hot showers, and rest, the pain persists. She does not currently see a pain management specialist. A recent kidney appointment confirmed that her kidneys are functioning normally. She has a history of diabetes and chronic kidney disease, which complicates the use of certain medications for pain relief.    Additionally, she has noticed two spots on her back that have increased in size and do not seem to be resolving. One of these spots causes pain when scratched.       Past Medical History:   Diagnosis Date    Anxiety 2021    Anxiety    Brain tumor (benign)     pituitary gland    Brain tumor (benign)     pituatary gland    Cancer     ovarian; history of    Depression 2021    Diabetes mellitus     GERD (gastroesophageal reflux disease) 2023    Gastritis    Headache 2003    Hyperlipidemia 2018    Hypertension 2018    Obesity 1980s    Renal insufficiency 2024    Visual impairment         Past Surgical History:   Procedure Laterality Date    ENDOSCOPY N/A 06/16/2023    Procedure: ESOPHAGOGASTRODUODENOSCOPY w/ biopsy;  Surgeon: Alyssa Damon MD;  Location: [facility];  Service: Gastroenterology;  Laterality: N/A;  gastritis    EYE SURGERY  Feb 2022    HYSTERECTOMY      UPPER GASTROINTESTINAL ENDOSCOPY          Social History     Tobacco Use   Smoking Status Never    Passive exposure: Never   Smokeless Tobacco Never         Patient Care Team:  Mayela Bruce APRN as PCP - General (Nurse Practitioner)  Yvonne Sigala APRN as Nurse Practitioner (Nurse Practitioner)  Gerry Henry MD as Consulting Physician (Nephrology)  Miguel Lockwood DPM as Consulting Physician (Podiatry)  Sen Blanc MD as Consulting Physician (Ophthalmology)  Gail Gray APRN (Nurse Practitioner)    Allergies   Allergen Reactions    Ondansetron Dizziness and Other (See Comments)          Current Outpatient Medications:     amLODIPine (NORVASC) 5 MG tablet, Take 1 tablet by mouth Daily., Disp: , Rfl:     atorvastatin (LIPITOR) 40 MG tablet, Take 1 tablet by mouth every night at bedtime., Disp: 90 tablet, Rfl: 1    B Complex-C (B-complex with vitamin C) tablet, Take 1 tablet by mouth Daily., Disp: 30 tablet, Rfl: 11    buPROPion XL (Wellbutrin XL) 300 MG 24 hr tablet, Take 1 tablet by mouth Every Morning., Disp: 90 tablet, Rfl: 1    Continuous Glucose Sensor (FreeStyle Lisbeth 3 Plus Sensor), CHANGE EVERY 15 DAYS, Disp: , Rfl:     Embecta Pen Needle Imelda 2 Gen 32G X 4 MM misc, , Disp: , Rfl:     empagliflozin (Jardiance) 25 MG tablet tablet, Take 1 tablet by mouth Daily., Disp: , Rfl:     famotidine (PEPCID) 40 MG tablet, Take 1 tablet by mouth 2 (Two) Times a Day., Disp: 60 tablet, Rfl: 3    fenofibrate (TRICOR) 145 MG tablet, Take 1 tablet by mouth Daily., Disp: 90 tablet, Rfl: 1    folic acid (FOLVITE) 1 MG tablet, Take 1 tablet by mouth Daily., Disp: 90 tablet, Rfl: 0    glucose blood test strip, 1 each by Other route 3 (Three) Times a Day Before Meals. Use as instructed, Disp: 90 each, Rfl: 12    hydrOXYzine (ATARAX) 25 MG tablet, Take 1 tablet by mouth Every 6 (Six) Hours As Needed for Itching., Disp: 360 tablet, Rfl: 1    Insulin Aspart FlexPen 100 UNIT/ML solution pen-injector, Inject 4 units before meals plus 1:50>150 as needed. MDD: 30 units, Disp: 15 mL, Rfl: 5    insulin degludec (Tresiba FlexTouch) 100 UNIT/ML  "solution pen-injector injection, Inject 32 Units under the skin into the appropriate area as directed Every Morning.Titrated as directed. MDD: 60 units, Disp: 30 mL, Rfl: 5    linaclotide (LINZESS) 145 MCG capsule capsule, Take 1 capsule by mouth Daily., Disp: , Rfl:     losartan (Cozaar) 100 MG tablet, Take 1 tablet by mouth Daily., Disp: 90 tablet, Rfl: 1    OneTouch Delica Lancets 33G misc, 1 each by Other route 3 (Three) Times a Day Before Meals., Disp: 100 each, Rfl: 12    pantoprazole (PROTONIX) 40 MG EC tablet, Take 1 tablet by mouth 2 (Two) Times a Day Before Meals., Disp: 180 tablet, Rfl: 1    TiZANidine (ZANAFLEX) 2 MG capsule, Take 1 capsule by mouth 3 (Three) Times a Day As Needed for Muscle Spasms., Disp: 30 capsule, Rfl: 1    vitamin D (ERGOCALCIFEROL) 1.25 MG (72036 UT) capsule capsule, Take 1 capsule by mouth 1 (One) Time Per Week. Take with a fatty meal, Disp: 13 capsule, Rfl: 3    Acetone, Urine, Test (Ketone Test) strip, 1 strip if needed for high blood sugar (illness, vomiting). Call clinic if positive. (Patient not taking: Reported on 7/30/2025), Disp: 50 each, Rfl: 2    HYDROcodone-acetaminophen (NORCO) 5-325 MG per tablet, Take 1 tablet by mouth Every 6 (Six) Hours As Needed for Moderate Pain. (Patient not taking: Reported on 7/30/2025), Disp: 12 tablet, Rfl: 0    Objective     Vitals:    07/30/25 1432   BP: 148/76   BP Location: Left arm   Patient Position: Sitting   Cuff Size: Large Adult   Pulse: 75   Resp: 18   Temp: 98.4 °F (36.9 °C)   TempSrc: Temporal   SpO2: 97%   Weight: 102 kg (224 lb)   Height: 160 cm (62.99\")   PainSc: 8    PainLoc: Back        Wt Readings from Last 3 Encounters:   07/30/25 102 kg (224 lb)   07/11/25 95.3 kg (210 lb)   06/10/25 95.3 kg (210 lb)        BP Readings from Last 3 Encounters:   07/30/25 148/76   07/11/25 102/69   06/04/25 148/76        Physical Exam  Musculoskeletal: Tenderness in the right hip and SI joint. Pain radiates down the leg above the knee. " Numbness and tingling sensation in the right thigh.  Skin: Flesh-colored raised area on the back and excoriated small lesion to left lower leg.    Physical Exam      - Injection Tendon or Ligament    Date/Time: 2025 3:05 PM    Performed by: Mayela Bruce APRN  Authorized by: Mayela Bruce APRN      Preparation: Patient was prepped and draped in the usual sterile fashion.    Anesthesia:  Local anesthesia used: yes  Local Anesthetic: topical anesthetic  Patient tolerance: patient tolerated the procedure well with no immediate complications  Comments: Risk and benefits discussed and consent form obtained.  Immediately prior to procedure a time out was called to verify the correct patient, procedure, equipment and site. Under aseptic conditions I injected the listed medications I injected 40 mg of Depo-Medrol 4 mL of lidocaine into the right hip at the point of maximum tenderness.    Post injection instructions include the followin.  Avoid excessive activity for 24 to 48 hours.  2.  Gradual return to full activity.  3.  Apply ice 3 times per day for 3 days.  4.  May take over-the-counter pain reliever as directed.  5.  Follow-up with Dr. Nixon, orthopedic.  Medications administered: 40 mg methylPREDNISolone acetate 40 MG/ML; 4 mL lidocaine 1 %    Laterality: right  Needle Size: 21  Approach: anterolateral        Result Review   The following data was reviewed by: SHAE Mcgee on 2025:  [x]  Tests & Results  []  Hospitalization/Emergency Department/Urgent Care  []  Internal/External Consultant Notes       Assessment and Plan     Diagnoses and all orders for this visit:    1. Pain in right hip (Primary)  -     Ambulatory Referral to Orthopedic Surgery  -     - Injection Tendon or Ligament    2. Skin lesions  -     Ambulatory Referral to Dermatology         Assessment & Plan  Right-sided back pain.  - The pain is likely musculoskeletal in nature, possibly originating from the right  hip rather than the back.  - The presence of diabetes and chronic kidney disease complicates the use of steroids and anti-inflammatories.  - A steroid injection into the right hip was administered today for pain relief.  - A referral to an orthopedic specialist, Dr. Nixon, was also made for further evaluation and potential additional testing on the hip.    Skin lesions.  - Two flesh-colored raised areas on the back were noted.  - While they do not appear urgent, further evaluation is recommended.  - A referral to a dermatologist was made for a comprehensive skin check.        PROCEDURE    Procedure: Steroid injection in the right hip  - Procedural Discussion: Discussed the benefits of steroid injection for pain relief in the right hip. Risks such as bleeding, bruising, and infection were covered. Alternative options like pain pills and anti-inflammatories were reviewed but deemed unsuitable due to diabetes and chronic kidney disease. Consent obtained from the patient.  - Medication: Steroid, lidocaine  - Technique: Steroid injection administered directly into the right hip where the patient reported pain.        Patient was given instructions and counseling regarding her condition or for health maintenance advice. Please see specific information pulled into the AVS if appropriate.     Follow Up   Return for Next scheduled follow up.    Patient or patient representative verbalized consent for the use of Ambient Listening during the visit with  SHAE Mcgee for chart documentation. 7/30/2025  14:42 EDT    SHAE Mcgee

## 2025-07-30 ENCOUNTER — OFFICE VISIT (OUTPATIENT)
Dept: INTERNAL MEDICINE | Age: 45
End: 2025-07-30
Payer: MEDICARE

## 2025-07-30 VITALS
BODY MASS INDEX: 39.69 KG/M2 | HEIGHT: 63 IN | SYSTOLIC BLOOD PRESSURE: 148 MMHG | TEMPERATURE: 98.4 F | RESPIRATION RATE: 18 BRPM | WEIGHT: 224 LBS | DIASTOLIC BLOOD PRESSURE: 76 MMHG | HEART RATE: 75 BPM | OXYGEN SATURATION: 97 %

## 2025-07-30 DIAGNOSIS — M25.551 PAIN IN RIGHT HIP: Primary | ICD-10-CM

## 2025-07-30 DIAGNOSIS — L98.9 SKIN LESIONS: ICD-10-CM

## 2025-07-30 RX ORDER — LIDOCAINE HYDROCHLORIDE 10 MG/ML
4 INJECTION, SOLUTION INFILTRATION; PERINEURAL
Status: COMPLETED | OUTPATIENT
Start: 2025-07-30 | End: 2025-07-30

## 2025-07-30 RX ORDER — METHYLPREDNISOLONE ACETATE 40 MG/ML
40 INJECTION, SUSPENSION INTRA-ARTICULAR; INTRALESIONAL; INTRAMUSCULAR; SOFT TISSUE
Status: COMPLETED | OUTPATIENT
Start: 2025-07-30 | End: 2025-07-30

## 2025-07-30 RX ADMIN — METHYLPREDNISOLONE ACETATE 40 MG: 40 INJECTION, SUSPENSION INTRA-ARTICULAR; INTRALESIONAL; INTRAMUSCULAR; SOFT TISSUE at 15:05

## 2025-07-30 RX ADMIN — LIDOCAINE HYDROCHLORIDE 4 ML: 10 INJECTION, SOLUTION INFILTRATION; PERINEURAL at 15:05

## 2025-07-31 DIAGNOSIS — K21.9 GASTROESOPHAGEAL REFLUX DISEASE WITHOUT ESOPHAGITIS: ICD-10-CM

## 2025-07-31 RX ORDER — FAMOTIDINE 40 MG/1
40 TABLET, FILM COATED ORAL 2 TIMES DAILY
Qty: 180 TABLET | Refills: 1 | Status: SHIPPED | OUTPATIENT
Start: 2025-07-31

## 2025-07-31 NOTE — TELEPHONE ENCOUNTER
Medication Requested Famotidine 40mg    Last Refill 1/21/2025    Last OV 7/2/2024    Next OV None    Medication pended for approval and correct pharmacy verified Yes

## 2025-08-06 DIAGNOSIS — M54.9 RIGHT-SIDED BACK PAIN, UNSPECIFIED BACK LOCATION, UNSPECIFIED CHRONICITY: ICD-10-CM

## 2025-08-06 DIAGNOSIS — M25.551 PAIN IN RIGHT HIP: Primary | ICD-10-CM

## 2025-08-08 ENCOUNTER — HOSPITAL ENCOUNTER (OUTPATIENT)
Dept: GENERAL RADIOLOGY | Facility: HOSPITAL | Age: 45
Discharge: HOME | End: 2025-08-08
Payer: MEDICARE

## 2025-08-08 DIAGNOSIS — M25.551 PAIN IN RIGHT HIP: ICD-10-CM

## 2025-08-08 DIAGNOSIS — M54.9 RIGHT-SIDED BACK PAIN, UNSPECIFIED BACK LOCATION, UNSPECIFIED CHRONICITY: ICD-10-CM

## 2025-08-08 PROCEDURE — 73502 X-RAY EXAM HIP UNI 2-3 VIEWS: CPT

## 2025-08-08 PROCEDURE — 72072 X-RAY EXAM THORAC SPINE 3VWS: CPT

## 2025-08-08 PROCEDURE — 72110 X-RAY EXAM L-2 SPINE 4/>VWS: CPT

## 2025-08-11 ENCOUNTER — TELEPHONE (OUTPATIENT)
Dept: INTERNAL MEDICINE | Age: 45
End: 2025-08-11
Payer: MEDICARE

## 2025-08-11 DIAGNOSIS — M25.551 PAIN IN RIGHT HIP: ICD-10-CM

## 2025-08-11 DIAGNOSIS — M54.9 RIGHT-SIDED BACK PAIN, UNSPECIFIED BACK LOCATION, UNSPECIFIED CHRONICITY: Primary | ICD-10-CM

## 2025-08-14 ENCOUNTER — TELEPHONE (OUTPATIENT)
Dept: INTERNAL MEDICINE | Age: 45
End: 2025-08-14
Payer: MEDICARE

## 2025-08-14 DIAGNOSIS — M54.9 RIGHT-SIDED BACK PAIN, UNSPECIFIED BACK LOCATION, UNSPECIFIED CHRONICITY: Primary | ICD-10-CM

## 2025-08-14 DIAGNOSIS — M25.562 LEFT KNEE PAIN, UNSPECIFIED CHRONICITY: ICD-10-CM

## 2025-08-14 DIAGNOSIS — M25.551 PAIN IN RIGHT HIP: ICD-10-CM

## 2025-08-20 RX ORDER — TIZANIDINE HYDROCHLORIDE 2 MG/1
CAPSULE, GELATIN COATED ORAL
Qty: 30 CAPSULE | Refills: 1 | Status: SHIPPED | OUTPATIENT
Start: 2025-08-20

## 2025-08-27 DIAGNOSIS — K21.9 GASTROESOPHAGEAL REFLUX DISEASE WITHOUT ESOPHAGITIS: ICD-10-CM

## 2025-08-27 RX ORDER — FENOFIBRATE 145 MG/1
145 TABLET, FILM COATED ORAL DAILY
Qty: 90 TABLET | Refills: 1 | Status: SHIPPED | OUTPATIENT
Start: 2025-08-27

## 2025-08-27 RX ORDER — PANTOPRAZOLE SODIUM 40 MG/1
40 TABLET, DELAYED RELEASE ORAL
Qty: 180 TABLET | Refills: 1 | OUTPATIENT
Start: 2025-08-27